# Patient Record
Sex: MALE | Race: WHITE
[De-identification: names, ages, dates, MRNs, and addresses within clinical notes are randomized per-mention and may not be internally consistent; named-entity substitution may affect disease eponyms.]

---

## 2017-03-27 ENCOUNTER — HOSPITAL ENCOUNTER (OUTPATIENT)
Dept: HOSPITAL 8 - LAB | Age: 72
Discharge: HOME | End: 2017-03-27
Attending: NEUROLOGICAL SURGERY
Payer: OTHER GOVERNMENT

## 2017-03-27 DIAGNOSIS — M48.06: Primary | ICD-10-CM

## 2017-03-27 LAB
BUN SERPL-MCNC: 13 MG/DL (ref 7–18)
HGB BLD-MCNC: 15.4 G/DL (ref 13.7–18)

## 2017-03-27 PROCEDURE — 85025 COMPLETE CBC W/AUTO DIFF WBC: CPT

## 2017-03-27 PROCEDURE — 36415 COLL VENOUS BLD VENIPUNCTURE: CPT

## 2017-03-27 PROCEDURE — 80048 BASIC METABOLIC PNL TOTAL CA: CPT

## 2019-08-21 ENCOUNTER — ANESTHESIA EVENT (OUTPATIENT)
Dept: SURGERY | Facility: MEDICAL CENTER | Age: 74
End: 2019-08-21
Payer: COMMERCIAL

## 2019-08-21 ENCOUNTER — ANESTHESIA (OUTPATIENT)
Dept: SURGERY | Facility: MEDICAL CENTER | Age: 74
End: 2019-08-21
Payer: COMMERCIAL

## 2019-08-21 ENCOUNTER — APPOINTMENT (OUTPATIENT)
Dept: RADIOLOGY | Facility: MEDICAL CENTER | Age: 74
End: 2019-08-21
Attending: NEUROLOGICAL SURGERY
Payer: COMMERCIAL

## 2019-08-21 ENCOUNTER — HOSPITAL ENCOUNTER (OUTPATIENT)
Facility: MEDICAL CENTER | Age: 74
End: 2019-08-21
Attending: NEUROLOGICAL SURGERY | Admitting: NEUROLOGICAL SURGERY
Payer: COMMERCIAL

## 2019-08-21 VITALS
BODY MASS INDEX: 29.19 KG/M2 | HEART RATE: 74 BPM | WEIGHT: 203.93 LBS | TEMPERATURE: 97.6 F | RESPIRATION RATE: 17 BRPM | HEIGHT: 70 IN | DIASTOLIC BLOOD PRESSURE: 86 MMHG | SYSTOLIC BLOOD PRESSURE: 127 MMHG | OXYGEN SATURATION: 100 %

## 2019-08-21 LAB — APTT PPP: 34.2 SEC (ref 24.7–36)

## 2019-08-21 PROCEDURE — 700105 HCHG RX REV CODE 258: Performed by: NEUROLOGICAL SURGERY

## 2019-08-21 PROCEDURE — 501838 HCHG SUTURE GENERAL: Performed by: NEUROLOGICAL SURGERY

## 2019-08-21 PROCEDURE — 700101 HCHG RX REV CODE 250: Performed by: ANESTHESIOLOGY

## 2019-08-21 PROCEDURE — 500002 HCHG ADHESIVE, DERMABOND: Performed by: NEUROLOGICAL SURGERY

## 2019-08-21 PROCEDURE — 700101 HCHG RX REV CODE 250: Performed by: NEUROLOGICAL SURGERY

## 2019-08-21 PROCEDURE — 160025 RECOVERY II MINUTES (STATS): Performed by: NEUROLOGICAL SURGERY

## 2019-08-21 PROCEDURE — 85730 THROMBOPLASTIN TIME PARTIAL: CPT

## 2019-08-21 PROCEDURE — 700111 HCHG RX REV CODE 636 W/ 250 OVERRIDE (IP): Performed by: ANESTHESIOLOGY

## 2019-08-21 PROCEDURE — 160048 HCHG OR STATISTICAL LEVEL 1-5: Performed by: NEUROLOGICAL SURGERY

## 2019-08-21 PROCEDURE — 160036 HCHG PACU - EA ADDL 30 MINS PHASE I: Performed by: NEUROLOGICAL SURGERY

## 2019-08-21 PROCEDURE — 700111 HCHG RX REV CODE 636 W/ 250 OVERRIDE (IP)

## 2019-08-21 PROCEDURE — 72020 X-RAY EXAM OF SPINE 1 VIEW: CPT

## 2019-08-21 PROCEDURE — 700102 HCHG RX REV CODE 250 W/ 637 OVERRIDE(OP): Performed by: ANESTHESIOLOGY

## 2019-08-21 PROCEDURE — 160002 HCHG RECOVERY MINUTES (STAT): Performed by: NEUROLOGICAL SURGERY

## 2019-08-21 PROCEDURE — 160047 HCHG PACU  - EA ADDL 30 MINS PHASE II: Performed by: NEUROLOGICAL SURGERY

## 2019-08-21 PROCEDURE — 700111 HCHG RX REV CODE 636 W/ 250 OVERRIDE (IP): Performed by: NEUROLOGICAL SURGERY

## 2019-08-21 PROCEDURE — 160029 HCHG SURGERY MINUTES - 1ST 30 MINS LEVEL 4: Performed by: NEUROLOGICAL SURGERY

## 2019-08-21 PROCEDURE — 160035 HCHG PACU - 1ST 60 MINS PHASE I: Performed by: NEUROLOGICAL SURGERY

## 2019-08-21 PROCEDURE — A9270 NON-COVERED ITEM OR SERVICE: HCPCS | Performed by: ANESTHESIOLOGY

## 2019-08-21 PROCEDURE — 500885 HCHG PACK, JACKSON TABLE: Performed by: NEUROLOGICAL SURGERY

## 2019-08-21 PROCEDURE — 160009 HCHG ANES TIME/MIN: Performed by: NEUROLOGICAL SURGERY

## 2019-08-21 PROCEDURE — 160041 HCHG SURGERY MINUTES - EA ADDL 1 MIN LEVEL 4: Performed by: NEUROLOGICAL SURGERY

## 2019-08-21 PROCEDURE — 160046 HCHG PACU - 1ST 60 MINS PHASE II: Performed by: NEUROLOGICAL SURGERY

## 2019-08-21 RX ORDER — HYDROMORPHONE HYDROCHLORIDE 1 MG/ML
0.1 INJECTION, SOLUTION INTRAMUSCULAR; INTRAVENOUS; SUBCUTANEOUS
Status: DISCONTINUED | OUTPATIENT
Start: 2019-08-21 | End: 2019-08-21 | Stop reason: HOSPADM

## 2019-08-21 RX ORDER — HYDROMORPHONE HYDROCHLORIDE 1 MG/ML
0.2 INJECTION, SOLUTION INTRAMUSCULAR; INTRAVENOUS; SUBCUTANEOUS
Status: DISCONTINUED | OUTPATIENT
Start: 2019-08-21 | End: 2019-08-21 | Stop reason: HOSPADM

## 2019-08-21 RX ORDER — GABAPENTIN 300 MG/1
300 CAPSULE ORAL ONCE
Status: COMPLETED | OUTPATIENT
Start: 2019-08-21 | End: 2019-08-21

## 2019-08-21 RX ORDER — METOPROLOL TARTRATE 1 MG/ML
1 INJECTION, SOLUTION INTRAVENOUS
Status: DISCONTINUED | OUTPATIENT
Start: 2019-08-21 | End: 2019-08-21 | Stop reason: HOSPADM

## 2019-08-21 RX ORDER — METHYLPREDNISOLONE SODIUM SUCCINATE 125 MG/2ML
INJECTION, POWDER, LYOPHILIZED, FOR SOLUTION INTRAMUSCULAR; INTRAVENOUS
Status: DISCONTINUED | OUTPATIENT
Start: 2019-08-21 | End: 2019-08-21 | Stop reason: HOSPADM

## 2019-08-21 RX ORDER — LABETALOL HYDROCHLORIDE 5 MG/ML
5 INJECTION, SOLUTION INTRAVENOUS
Status: DISCONTINUED | OUTPATIENT
Start: 2019-08-21 | End: 2019-08-21 | Stop reason: HOSPADM

## 2019-08-21 RX ORDER — SODIUM CHLORIDE, SODIUM LACTATE, POTASSIUM CHLORIDE, CALCIUM CHLORIDE 600; 310; 30; 20 MG/100ML; MG/100ML; MG/100ML; MG/100ML
INJECTION, SOLUTION INTRAVENOUS CONTINUOUS
Status: DISCONTINUED | OUTPATIENT
Start: 2019-08-21 | End: 2019-08-21 | Stop reason: HOSPADM

## 2019-08-21 RX ORDER — HALOPERIDOL 5 MG/ML
1 INJECTION INTRAMUSCULAR
Status: DISCONTINUED | OUTPATIENT
Start: 2019-08-21 | End: 2019-08-21 | Stop reason: HOSPADM

## 2019-08-21 RX ORDER — ROCURONIUM BROMIDE 10 MG/ML
INJECTION, SOLUTION INTRAVENOUS PRN
Status: DISCONTINUED | OUTPATIENT
Start: 2019-08-21 | End: 2019-08-21 | Stop reason: SURG

## 2019-08-21 RX ORDER — LIDOCAINE HYDROCHLORIDE 10 MG/ML
INJECTION, SOLUTION EPIDURAL; INFILTRATION; INTRACAUDAL; PERINEURAL
Status: COMPLETED
Start: 2019-08-21 | End: 2019-08-21

## 2019-08-21 RX ORDER — MIDAZOLAM HYDROCHLORIDE 1 MG/ML
INJECTION INTRAMUSCULAR; INTRAVENOUS PRN
Status: DISCONTINUED | OUTPATIENT
Start: 2019-08-21 | End: 2019-08-21 | Stop reason: SURG

## 2019-08-21 RX ORDER — OXYCODONE HCL 5 MG/5 ML
10 SOLUTION, ORAL ORAL
Status: COMPLETED | OUTPATIENT
Start: 2019-08-21 | End: 2019-08-21

## 2019-08-21 RX ORDER — OXYCODONE HCL 5 MG/5 ML
5 SOLUTION, ORAL ORAL
Status: COMPLETED | OUTPATIENT
Start: 2019-08-21 | End: 2019-08-21

## 2019-08-21 RX ORDER — BUPIVACAINE HYDROCHLORIDE AND EPINEPHRINE 5; 5 MG/ML; UG/ML
INJECTION, SOLUTION EPIDURAL; INTRACAUDAL; PERINEURAL
Status: DISCONTINUED | OUTPATIENT
Start: 2019-08-21 | End: 2019-08-21 | Stop reason: HOSPADM

## 2019-08-21 RX ORDER — DIPHENHYDRAMINE HYDROCHLORIDE 50 MG/ML
12.5 INJECTION INTRAMUSCULAR; INTRAVENOUS
Status: DISCONTINUED | OUTPATIENT
Start: 2019-08-21 | End: 2019-08-21 | Stop reason: HOSPADM

## 2019-08-21 RX ORDER — ACETAMINOPHEN 500 MG
1000 TABLET ORAL ONCE
Status: COMPLETED | OUTPATIENT
Start: 2019-08-21 | End: 2019-08-21

## 2019-08-21 RX ORDER — HYDROMORPHONE HYDROCHLORIDE 1 MG/ML
0.4 INJECTION, SOLUTION INTRAMUSCULAR; INTRAVENOUS; SUBCUTANEOUS
Status: DISCONTINUED | OUTPATIENT
Start: 2019-08-21 | End: 2019-08-21 | Stop reason: HOSPADM

## 2019-08-21 RX ORDER — GABAPENTIN 300 MG/1
900 CAPSULE ORAL 2 TIMES DAILY
COMMUNITY
End: 2021-06-01

## 2019-08-21 RX ORDER — DEXAMETHASONE SODIUM PHOSPHATE 4 MG/ML
INJECTION, SOLUTION INTRA-ARTICULAR; INTRALESIONAL; INTRAMUSCULAR; INTRAVENOUS; SOFT TISSUE PRN
Status: DISCONTINUED | OUTPATIENT
Start: 2019-08-21 | End: 2019-08-21 | Stop reason: SURG

## 2019-08-21 RX ORDER — KETOROLAC TROMETHAMINE 30 MG/ML
INJECTION, SOLUTION INTRAMUSCULAR; INTRAVENOUS PRN
Status: DISCONTINUED | OUTPATIENT
Start: 2019-08-21 | End: 2019-08-21 | Stop reason: SURG

## 2019-08-21 RX ORDER — BACITRACIN 50000 [IU]/1
INJECTION, POWDER, FOR SOLUTION INTRAMUSCULAR
Status: DISCONTINUED | OUTPATIENT
Start: 2019-08-21 | End: 2019-08-21 | Stop reason: HOSPADM

## 2019-08-21 RX ORDER — OXYCODONE HYDROCHLORIDE AND ACETAMINOPHEN 5; 325 MG/1; MG/1
1-2 TABLET ORAL EVERY 4 HOURS PRN
Status: ON HOLD | COMMUNITY
End: 2019-08-21

## 2019-08-21 RX ORDER — CEFAZOLIN SODIUM 1 G/3ML
INJECTION, POWDER, FOR SOLUTION INTRAMUSCULAR; INTRAVENOUS PRN
Status: DISCONTINUED | OUTPATIENT
Start: 2019-08-21 | End: 2019-08-21 | Stop reason: SURG

## 2019-08-21 RX ORDER — HYDRALAZINE HYDROCHLORIDE 20 MG/ML
5 INJECTION INTRAMUSCULAR; INTRAVENOUS
Status: DISCONTINUED | OUTPATIENT
Start: 2019-08-21 | End: 2019-08-21 | Stop reason: HOSPADM

## 2019-08-21 RX ORDER — ONDANSETRON 2 MG/ML
INJECTION INTRAMUSCULAR; INTRAVENOUS PRN
Status: DISCONTINUED | OUTPATIENT
Start: 2019-08-21 | End: 2019-08-21 | Stop reason: SURG

## 2019-08-21 RX ADMIN — FENTANYL CITRATE 25 MCG: 50 INJECTION, SOLUTION INTRAMUSCULAR; INTRAVENOUS at 09:11

## 2019-08-21 RX ADMIN — SODIUM CHLORIDE, POTASSIUM CHLORIDE, SODIUM LACTATE AND CALCIUM CHLORIDE: 600; 310; 30; 20 INJECTION, SOLUTION INTRAVENOUS at 06:26

## 2019-08-21 RX ADMIN — Medication 0.5 ML: at 06:26

## 2019-08-21 RX ADMIN — FENTANYL CITRATE 25 MCG: 50 INJECTION, SOLUTION INTRAMUSCULAR; INTRAVENOUS at 09:20

## 2019-08-21 RX ADMIN — SUGAMMADEX 200 MG: 100 INJECTION, SOLUTION INTRAVENOUS at 08:38

## 2019-08-21 RX ADMIN — MIDAZOLAM 2 MG: 1 INJECTION INTRAMUSCULAR; INTRAVENOUS at 07:36

## 2019-08-21 RX ADMIN — PROPOFOL 150 MG: 10 INJECTION, EMULSION INTRAVENOUS at 07:46

## 2019-08-21 RX ADMIN — ACETAMINOPHEN 1000 MG: 500 TABLET ORAL at 07:28

## 2019-08-21 RX ADMIN — GABAPENTIN 300 MG: 300 CAPSULE ORAL at 07:28

## 2019-08-21 RX ADMIN — CEFAZOLIN 2 G: 330 INJECTION, POWDER, FOR SOLUTION INTRAMUSCULAR; INTRAVENOUS at 07:36

## 2019-08-21 RX ADMIN — ROCURONIUM BROMIDE 50 MG: 10 INJECTION, SOLUTION INTRAVENOUS at 07:46

## 2019-08-21 RX ADMIN — FENTANYL CITRATE 100 MCG: 50 INJECTION, SOLUTION INTRAMUSCULAR; INTRAVENOUS at 07:46

## 2019-08-21 RX ADMIN — ONDANSETRON 4 MG: 2 INJECTION INTRAMUSCULAR; INTRAVENOUS at 08:35

## 2019-08-21 RX ADMIN — DEXAMETHASONE SODIUM PHOSPHATE 4 MG: 4 INJECTION, SOLUTION INTRA-ARTICULAR; INTRALESIONAL; INTRAMUSCULAR; INTRAVENOUS; SOFT TISSUE at 07:38

## 2019-08-21 RX ADMIN — OXYCODONE HYDROCHLORIDE 10 MG: 5 SOLUTION ORAL at 09:08

## 2019-08-21 RX ADMIN — LIDOCAINE HYDROCHLORIDE 0.5 ML: 10 INJECTION, SOLUTION EPIDURAL; INFILTRATION; INTRACAUDAL at 06:26

## 2019-08-21 RX ADMIN — KETOROLAC TROMETHAMINE 30 MG: 30 INJECTION, SOLUTION INTRAMUSCULAR at 08:35

## 2019-08-21 ASSESSMENT — PAIN SCALES - GENERAL: PAIN_LEVEL: 0

## 2019-08-21 NOTE — DISCHARGE INSTRUCTIONS
ACTIVITY: Rest and take it easy for the first 24 hours.  A responsible adult is recommended to remain with you during that time.  It is normal to feel sleepy.  We encourage you to not do anything that requires balance, judgment or coordination.    MILD FLU-LIKE SYMPTOMS ARE NORMAL. YOU MAY EXPERIENCE GENERALIZED MUSCLE ACHES, THROAT IRRITATION, HEADACHE AND/OR SOME NAUSEA.    FOR 24 HOURS DO NOT:  Drive, operate machinery or run household appliances.  Drink beer or alcoholic beverages.   Make important decisions or sign legal documents.    SPECIAL INSTRUCTIONS:     Follow up with APN at [facility] in 2 weeks [phone]  Follow up with Dr. Peralta in 6 weeks  No pushing, pulling, lifting greater than 10 pounds  No repetitive bending, no twisting   Ok to shower, pat incision dry - 24 hours after surgery. NO bath tubs, hot tubs, pools, etc   No non-steroidal anti-inflammatory medications or aspirin until cleared by Dr. Peralta  Ambulate as much is comfortable  No driving for at least 2 weeks following surgery or until cleared  Obtain over the counter senekot take 1-2 tablets daily while taking narcotic pain medication  Do not return to work until cleared by physician    DIET: To avoid nausea, slowly advance diet as tolerated, avoiding spicy or greasy foods for the first day.  Add more substantial food to your diet according to your physician's instructions. INCREASE FLUIDS AND FIBER TO AVOID CONSTIPATION.    FOLLOW-UP APPOINTMENT:  A follow-up appointment should be arranged with your doctor in *2 weeks*; call to schedule.    You should CALL YOUR PHYSICIAN if you develop:  Fever greater than 101 degrees F.  Pain not relieved by medication, or persistent nausea or vomiting.  Excessive bleeding (blood soaking through dressing) or unexpected drainage from the wound.  Extreme redness or swelling around the incision site, drainage of pus or foul smelling drainage.  Inability to urinate or empty your bladder  within 8 hours.  Problems with breathing or chest pain.    You should call 911 if you develop problems with breathing or chest pain.  If you are unable to contact your doctor or surgical center, you should go to the nearest emergency room or urgent care center.  Physician's telephone #: Dr. Peralta [phone]    If any questions arise, call your doctor.  If your doctor is not available, please feel free to call the Surgical Center at [phone].  The Center is open Monday through Friday from 7AM to 7PM.  You can also call the [facility] HOTLINE open 24 hours/day, 7 days/week and speak to a nurse at [phone], or toll free at [phone].    A registered nurse may call you a few days after your surgery to see how you are doing after your procedure.    MEDICATIONS: Resume taking daily medication.  Take prescribed pain medication with food.  If no medication is prescribed, you may take non-aspirin pain medication if needed.  PAIN MEDICATION CAN BE VERY CONSTIPATING.  Take a stool softener or laxative such as senokot, pericolace, or milk of magnesia if needed.    Prescriptions at home.  Last pain medication (10 mg oxycodone) given at 9:08 am.    If your physician has prescribed pain medication that includes Acetaminophen (Tylenol), do not take additional Acetaminophen (Tylenol) while taking the prescribed medication.    Depression / Suicide Risk    As you are discharged from this [facility] facility, it is important to learn how to keep safe from harming yourself.    Recognize the warning signs:  · Abrupt changes in personality, positive or negative- including increase in energy   · Giving away possessions  · Change in eating patterns- significant weight changes-  positive or negative  · Change in sleeping patterns- unable to sleep or sleeping all the time   · Unwillingness or inability to communicate  · Depression  · Unusual sadness, discouragement and loneliness  · Talk of wanting to die  · Neglect of  personal appearance   · Rebelliousness- reckless behavior  · Withdrawal from people/activities they love  · Confusion- inability to concentrate     If you or a loved one observes any of these behaviors or has concerns about self-harm, here's what you can do:  · Talk about it- your feelings and reasons for harming yourself  · Remove any means that you might use to hurt yourself (examples: pills, rope, extension cords, firearm)  · Get professional help from the community (Mental Health, Substance Abuse, psychological counseling)  · Do not be alone:Call your Safe Contact- someone whom you trust who will be there for you.  · Call your local CRISIS HOTLINE 836-2623 or 072-354-9271  · Call your local Children's Mobile Crisis Response Team [place] (840) 599-2013 or www.PayClip  · Call the toll free National Suicide Prevention Hotlines   · National Suicide Prevention Lifeline 657-884-WWTA (4739)  · National Hope Line Network 800-SUICIDE (458-0681)

## 2019-08-21 NOTE — OR SURGEON
Immediate Post OP Note    PreOp Diagnosis: lumbar stenosis     PostOp Diagnosis: lumbar stenosis     Procedure(s):  LAMINECTOMY, SPINE, LUMBAR, WITH [id number] - Wound Class: Clean    Surgeon(s):  Sukhwinder Peralta M.D.    Anesthesiologist/Type of Anesthesia:  Anesthesiologist: Calixto Swan D.O./General    Surgical Staff:  Assistant: CORTEZ Siddiqi  Circulator: Kay Grant R.N.  Scrub Person: Zhao Bkaer  Radiology Technologist: Tiara Kimbrough    Specimens removed if any:  * No specimens in log *    Estimated Blood Loss: min     Findings: good decompression     Complications: none         8/21/2019 10:47 AM CORTEZ Siddiqi

## 2019-08-21 NOTE — OR NURSING
Patient is awake alert and oriented, vital signs stable, resting in bed, no complaints of nausea, dressing to lower back is clean dry and intact.

## 2019-08-21 NOTE — ANESTHESIA TIME REPORT
Anesthesia Start and Stop Event Times     Date Time Event    8/21/2019 0701 Ready for Procedure     0736 Anesthesia Start     0859 Anesthesia Stop        Responsible Staff  08/21/19    Name Role Begin End    Calixto Swan D.O. Anesth 0736 0859        Preop Diagnosis (Free Text):  Pre-op Diagnosis     SPINAL STENOSIS OF LUMBAR REGION        Preop Diagnosis (Codes):    Post op Diagnosis  Spinal stenosis of lumbar region      Premium Reason  Non-Premium    Comments:

## 2019-08-21 NOTE — PROGRESS NOTES
1023- Patient arrived in PACU II alert and oriented. Vital signs are within normal limits for the patient. Patient reports pain 5/10 but it feels tolerable at this time. Dermabond closure is clean, dry, and intact. Discussed discharge plan of care and patient expressed understanding. All needs met at this time. Wife, Dianne, is at bedside.    1045- Provided patient with discharge education with wife at bedside. All questions answered.    1120- Patient ambulated to the restroom to void. Patient is steady on his feet with standby assist. Patient voided successfully.     1125- Patient feels ready to be discharged and meets discharge criteria set by Dr. Peralta. Patient is going to get dressed.     1130- PIV removed and patient discharged home via wheelchair.

## 2019-08-21 NOTE — ANESTHESIA POSTPROCEDURE EVALUATION
Patient: Pérez Willams    Procedure Summary     Date:  08/21/19 Room / Location:  [facility] 05 / SURGERY [facility]    Anesthesia Start:  0736 Anesthesia Stop:  0859    Procedure:  LAMINECTOMY, SPINE, LUMBAR, WITH [id number] (Spine Lumbar) Diagnosis:  (SPINAL STENOSIS OF LUMBAR REGION)    Surgeon:  Sukhwinder Peralta M.D. Responsible Provider:  Calixto Swan D.O.    Anesthesia Type:  general ASA Status:  2          Final Anesthesia Type: general  Last vitals  BP   NIBP: 138/86    Temp   36.8 °C (98.2 °F)    Pulse   Pulse: 78   Resp   18    SpO2   92 %      Anesthesia Post Evaluation    Patient location during evaluation: PACU  Patient participation: complete - patient participated  Level of consciousness: awake and alert  Pain score: 0    Airway patency: patent  Anesthetic complications: no  Cardiovascular status: hemodynamically stable  Respiratory status: acceptable  Hydration status: euvolemic    PONV: none           Nurse Pain Score: 0 (NPRS)

## 2019-08-21 NOTE — PROGRESS NOTES
Med rec updated and complete  Allergies reviewed  Interviewed pt with wife at bedside with permission from pt.  Pt reports no vitamins   Pt reports no antibiotics in the last 2 weeks

## 2019-08-21 NOTE — OP REPORT
DATE OF SERVICE:  08/21/2019    PREOPERATIVE DIAGNOSIS:  Lumbar 3-4 severe central canal stenosis with motor   and sensory radiculopathy recalcitrant to nonoperative measures.    POSTOPERATIVE DIAGNOSIS:  Lumbar 3-4 severe central canal stenosis with motor   and sensory radiculopathy recalcitrant to nonoperative measures.    PROCEDURES:  1.  Lumbar 3-4 laminectomy.  2.  Decompression of lumbar 3 roots.  3.  Decompression of lumbar 4 roots.  4.  Use of loupe magnification.    SURGEON:  Sukhwinder Peralta MD    ASSISTANT:  TARIQ Siddiqi    ANESTHESIA:  General.    COMPLICATIONS:  None.    ESTIMATED BLOOD LOSS:  Minimal.    DESCRIPTION OF PROCEDURE:  The patient was brought to the operating room,   identified in the usual fashion.  General endotracheal anesthesia was induced   by the anesthesia team.  The patient was then placed prone on the Rangel   table with bolsters.  All pressure points were meticulously padded.  Midline   lumbar incision was marked in the skin just inferior to his prior incision at   lumbar 2-3.  The patient was then prepped and draped in usual sterile fashion.    Local anesthesia was infiltrated in the subcutaneous tissue.  A 10 blade was   used to incise the skin.  Dissection was carried down in subperiosteal   fashion.  Retractors were put in place.  Kocher was placed at lumbar 3-4.  A   film was taken confirming that we were at the correct level.  This was then   marked with a Leksell rongeur.  We then adjusted the retractors and performed   a standard laminectomy of lumbar 3-4 using a combination of Leksell rongeur,   high-speed air drill, Kerrison 2, 3, and 4 punches.  We identified the thecal   sac and had it widely decompressed to the medial border of the pedicles.    Lateral recesses were nicely decompressed as confirmed with a double ball   probe and Kerrison 2 and 3 punches.  Lumbar 3 nerve roots were nicely   decompressed as well as the lumbar 4 root as well with  Kerrison 2 and 3   punches and confirmation with the [facility] dental.  Copious amounts of   antibiotic irrigation were used to wash out the wound.  FloSeal with gentle   tamponade was used for hemostasis.  Solu-Medrol and fentanyl were placed in   the epidural space.  All retractors were removed.  Hemostasis was meticulous.    We then closed the incision in layers and topped with Dermabond.  All sponge   and needle counts were correct x2 at the end the case.  I was present and   scrubbed for the entire procedure.  The patient was awakened and was   transferred to the recovery room in stable condition.       ____________________________________     LISA HONG MD    CPD / NTS    DD:  08/21/2019 09:10:28  DT:  08/21/2019 10:48:03    D#:  [id number]  Job#:  [id number]

## 2021-07-01 ENCOUNTER — HOSPITAL ENCOUNTER (OUTPATIENT)
Facility: MEDICAL CENTER | Age: 76
End: 2021-07-01
Attending: NEUROLOGICAL SURGERY | Admitting: NEUROLOGICAL SURGERY
Payer: COMMERCIAL

## 2021-07-04 ENCOUNTER — HOSPITAL ENCOUNTER (INPATIENT)
Facility: MEDICAL CENTER | Age: 76
LOS: 5 days | DRG: 854 | End: 2021-07-09
Attending: INTERNAL MEDICINE | Admitting: STUDENT IN AN ORGANIZED HEALTH CARE EDUCATION/TRAINING PROGRAM
Payer: COMMERCIAL

## 2021-07-04 ENCOUNTER — APPOINTMENT (OUTPATIENT)
Dept: RADIOLOGY | Facility: MEDICAL CENTER | Age: 76
DRG: 854 | End: 2021-07-04
Attending: STUDENT IN AN ORGANIZED HEALTH CARE EDUCATION/TRAINING PROGRAM
Payer: COMMERCIAL

## 2021-07-04 ENCOUNTER — HOSPITAL ENCOUNTER (OUTPATIENT)
Dept: RADIOLOGY | Facility: MEDICAL CENTER | Age: 76
End: 2021-07-04

## 2021-07-04 DIAGNOSIS — K80.31 CALCULUS OF BILE DUCT WITH CHOLANGITIS AND OBSTRUCTION, UNSPECIFIED CHOLANGITIS ACUITY: ICD-10-CM

## 2021-07-04 PROBLEM — R10.11 RUQ PAIN: Status: ACTIVE | Noted: 2021-07-04

## 2021-07-04 PROBLEM — J44.9 COPD (CHRONIC OBSTRUCTIVE PULMONARY DISEASE) (HCC): Status: ACTIVE | Noted: 2021-07-04

## 2021-07-04 PROBLEM — I10 HTN (HYPERTENSION): Status: ACTIVE | Noted: 2021-07-04

## 2021-07-04 PROBLEM — A41.9 SEPSIS (HCC): Status: ACTIVE | Noted: 2021-07-04

## 2021-07-04 LAB
ALBUMIN SERPL BCP-MCNC: 3.4 G/DL (ref 3.2–4.9)
ALBUMIN/GLOB SERPL: 1.1 G/DL
ALP SERPL-CCNC: 322 U/L (ref 30–99)
ALT SERPL-CCNC: 246 U/L (ref 2–50)
ANION GAP SERPL CALC-SCNC: 11 MMOL/L (ref 7–16)
APPEARANCE UR: CLEAR
AST SERPL-CCNC: 254 U/L (ref 12–45)
BACTERIA #/AREA URNS HPF: NEGATIVE /HPF
BASOPHILS # BLD AUTO: 0.3 % (ref 0–1.8)
BASOPHILS # BLD: 0.03 K/UL (ref 0–0.12)
BILIRUB SERPL-MCNC: 5.8 MG/DL (ref 0.1–1.5)
BILIRUB UR QL STRIP.AUTO: ABNORMAL
BUN SERPL-MCNC: 20 MG/DL (ref 8–22)
CALCIUM SERPL-MCNC: 8.8 MG/DL (ref 8.5–10.5)
CHLORIDE SERPL-SCNC: 106 MMOL/L (ref 96–112)
CO2 SERPL-SCNC: 24 MMOL/L (ref 20–33)
COLOR UR: ABNORMAL
CREAT SERPL-MCNC: 1.07 MG/DL (ref 0.5–1.4)
EKG IMPRESSION: NORMAL
EOSINOPHIL # BLD AUTO: 0.03 K/UL (ref 0–0.51)
EOSINOPHIL NFR BLD: 0.3 % (ref 0–6.9)
EPI CELLS #/AREA URNS HPF: NEGATIVE /HPF
ERYTHROCYTE [DISTWIDTH] IN BLOOD BY AUTOMATED COUNT: 44.6 FL (ref 35.9–50)
GGT SERPL-CCNC: 671 U/L (ref 7–51)
GLOBULIN SER CALC-MCNC: 3 G/DL (ref 1.9–3.5)
GLUCOSE SERPL-MCNC: 120 MG/DL (ref 65–99)
GLUCOSE UR STRIP.AUTO-MCNC: NEGATIVE MG/DL
HCT VFR BLD AUTO: 38 % (ref 42–52)
HGB BLD-MCNC: 13.3 G/DL (ref 14–18)
HYALINE CASTS #/AREA URNS LPF: ABNORMAL /LPF
IMM GRANULOCYTES # BLD AUTO: 0.05 K/UL (ref 0–0.11)
IMM GRANULOCYTES NFR BLD AUTO: 0.4 % (ref 0–0.9)
INR PPP: 1.2 (ref 0.87–1.13)
KETONES UR STRIP.AUTO-MCNC: NEGATIVE MG/DL
LACTATE BLD-SCNC: 1.2 MMOL/L (ref 0.5–2)
LACTATE BLD-SCNC: 1.3 MMOL/L (ref 0.5–2)
LACTATE BLD-SCNC: 1.4 MMOL/L (ref 0.5–2)
LEUKOCYTE ESTERASE UR QL STRIP.AUTO: NEGATIVE
LYMPHOCYTES # BLD AUTO: 0.59 K/UL (ref 1–4.8)
LYMPHOCYTES NFR BLD: 5.1 % (ref 22–41)
MCH RBC QN AUTO: 31.1 PG (ref 27–33)
MCHC RBC AUTO-ENTMCNC: 35 G/DL (ref 33.7–35.3)
MCV RBC AUTO: 88.8 FL (ref 81.4–97.8)
MICRO URNS: ABNORMAL
MONOCYTES # BLD AUTO: 0.84 K/UL (ref 0–0.85)
MONOCYTES NFR BLD AUTO: 7.3 % (ref 0–13.4)
NEUTROPHILS # BLD AUTO: 10.03 K/UL (ref 1.82–7.42)
NEUTROPHILS NFR BLD: 86.6 % (ref 44–72)
NITRITE UR QL STRIP.AUTO: NEGATIVE
NRBC # BLD AUTO: 0 K/UL
NRBC BLD-RTO: 0 /100 WBC
PH UR STRIP.AUTO: 7.5 [PH] (ref 5–8)
PLATELET # BLD AUTO: 143 K/UL (ref 164–446)
PMV BLD AUTO: 10.2 FL (ref 9–12.9)
POTASSIUM SERPL-SCNC: 4 MMOL/L (ref 3.6–5.5)
PROT SERPL-MCNC: 6.4 G/DL (ref 6–8.2)
PROT UR QL STRIP: 30 MG/DL
PROTHROMBIN TIME: 14.8 SEC (ref 12–14.6)
RBC # BLD AUTO: 4.28 M/UL (ref 4.7–6.1)
RBC # URNS HPF: ABNORMAL /HPF
RBC UR QL AUTO: ABNORMAL
SODIUM SERPL-SCNC: 141 MMOL/L (ref 135–145)
SP GR UR STRIP.AUTO: 1.04
UROBILINOGEN UR STRIP.AUTO-MCNC: 2 MG/DL
WBC # BLD AUTO: 11.6 K/UL (ref 4.8–10.8)
WBC #/AREA URNS HPF: ABNORMAL /HPF

## 2021-07-04 PROCEDURE — 85025 COMPLETE CBC W/AUTO DIFF WBC: CPT

## 2021-07-04 PROCEDURE — 74181 MRI ABDOMEN W/O CONTRAST: CPT | Mod: MG

## 2021-07-04 PROCEDURE — 83605 ASSAY OF LACTIC ACID: CPT

## 2021-07-04 PROCEDURE — A9270 NON-COVERED ITEM OR SERVICE: HCPCS | Performed by: STUDENT IN AN ORGANIZED HEALTH CARE EDUCATION/TRAINING PROGRAM

## 2021-07-04 PROCEDURE — 85610 PROTHROMBIN TIME: CPT

## 2021-07-04 PROCEDURE — 82977 ASSAY OF GGT: CPT

## 2021-07-04 PROCEDURE — 76705 ECHO EXAM OF ABDOMEN: CPT

## 2021-07-04 PROCEDURE — 700105 HCHG RX REV CODE 258: Performed by: STUDENT IN AN ORGANIZED HEALTH CARE EDUCATION/TRAINING PROGRAM

## 2021-07-04 PROCEDURE — 770006 HCHG ROOM/CARE - MED/SURG/GYN SEMI*

## 2021-07-04 PROCEDURE — 80053 COMPREHEN METABOLIC PANEL: CPT

## 2021-07-04 PROCEDURE — 99223 1ST HOSP IP/OBS HIGH 75: CPT | Performed by: STUDENT IN AN ORGANIZED HEALTH CARE EDUCATION/TRAINING PROGRAM

## 2021-07-04 PROCEDURE — 81001 URINALYSIS AUTO W/SCOPE: CPT

## 2021-07-04 PROCEDURE — 700102 HCHG RX REV CODE 250 W/ 637 OVERRIDE(OP): Performed by: STUDENT IN AN ORGANIZED HEALTH CARE EDUCATION/TRAINING PROGRAM

## 2021-07-04 PROCEDURE — 93010 ELECTROCARDIOGRAM REPORT: CPT | Performed by: INTERNAL MEDICINE

## 2021-07-04 PROCEDURE — 36415 COLL VENOUS BLD VENIPUNCTURE: CPT

## 2021-07-04 PROCEDURE — 700111 HCHG RX REV CODE 636 W/ 250 OVERRIDE (IP): Performed by: STUDENT IN AN ORGANIZED HEALTH CARE EDUCATION/TRAINING PROGRAM

## 2021-07-04 PROCEDURE — 87077 CULTURE AEROBIC IDENTIFY: CPT | Mod: 91

## 2021-07-04 PROCEDURE — 93005 ELECTROCARDIOGRAM TRACING: CPT | Performed by: STUDENT IN AN ORGANIZED HEALTH CARE EDUCATION/TRAINING PROGRAM

## 2021-07-04 PROCEDURE — 87186 SC STD MICRODIL/AGAR DIL: CPT

## 2021-07-04 PROCEDURE — 71045 X-RAY EXAM CHEST 1 VIEW: CPT

## 2021-07-04 PROCEDURE — 87040 BLOOD CULTURE FOR BACTERIA: CPT | Mod: 91

## 2021-07-04 PROCEDURE — 700101 HCHG RX REV CODE 250: Performed by: STUDENT IN AN ORGANIZED HEALTH CARE EDUCATION/TRAINING PROGRAM

## 2021-07-04 RX ORDER — AMOXICILLIN 250 MG
2 CAPSULE ORAL 2 TIMES DAILY
Status: DISCONTINUED | OUTPATIENT
Start: 2021-07-04 | End: 2021-07-09 | Stop reason: HOSPADM

## 2021-07-04 RX ORDER — OXYCODONE HYDROCHLORIDE 5 MG/1
5 TABLET ORAL
Status: DISCONTINUED | OUTPATIENT
Start: 2021-07-04 | End: 2021-07-06

## 2021-07-04 RX ORDER — LISINOPRIL 5 MG/1
2.5 TABLET ORAL
Status: DISCONTINUED | OUTPATIENT
Start: 2021-07-04 | End: 2021-07-09 | Stop reason: HOSPADM

## 2021-07-04 RX ORDER — SODIUM CHLORIDE 9 MG/ML
INJECTION, SOLUTION INTRAVENOUS CONTINUOUS
Status: DISCONTINUED | OUTPATIENT
Start: 2021-07-04 | End: 2021-07-09 | Stop reason: HOSPADM

## 2021-07-04 RX ORDER — POLYETHYLENE GLYCOL 3350 17 G/17G
1 POWDER, FOR SOLUTION ORAL
Status: DISCONTINUED | OUTPATIENT
Start: 2021-07-04 | End: 2021-07-09 | Stop reason: HOSPADM

## 2021-07-04 RX ORDER — OMEPRAZOLE 20 MG/1
40 CAPSULE, DELAYED RELEASE ORAL 2 TIMES DAILY
Status: DISCONTINUED | OUTPATIENT
Start: 2021-07-04 | End: 2021-07-09 | Stop reason: HOSPADM

## 2021-07-04 RX ORDER — HYDROMORPHONE HYDROCHLORIDE 1 MG/ML
0.25 INJECTION, SOLUTION INTRAMUSCULAR; INTRAVENOUS; SUBCUTANEOUS
Status: DISCONTINUED | OUTPATIENT
Start: 2021-07-04 | End: 2021-07-06

## 2021-07-04 RX ORDER — CARVEDILOL 6.25 MG/1
6.25 TABLET ORAL 2 TIMES DAILY WITH MEALS
Status: DISCONTINUED | OUTPATIENT
Start: 2021-07-04 | End: 2021-07-09 | Stop reason: HOSPADM

## 2021-07-04 RX ORDER — LORAZEPAM 2 MG/ML
1 INJECTION INTRAMUSCULAR ONCE
Status: COMPLETED | OUTPATIENT
Start: 2021-07-04 | End: 2021-07-04

## 2021-07-04 RX ORDER — OXYCODONE HYDROCHLORIDE 5 MG/1
2.5 TABLET ORAL
Status: DISCONTINUED | OUTPATIENT
Start: 2021-07-04 | End: 2021-07-06

## 2021-07-04 RX ORDER — BUDESONIDE AND FORMOTEROL FUMARATE DIHYDRATE 160; 4.5 UG/1; UG/1
2 AEROSOL RESPIRATORY (INHALATION) 2 TIMES DAILY
COMMUNITY
End: 2021-08-17

## 2021-07-04 RX ORDER — ONDANSETRON 2 MG/ML
4 INJECTION INTRAMUSCULAR; INTRAVENOUS EVERY 4 HOURS PRN
Status: DISCONTINUED | OUTPATIENT
Start: 2021-07-04 | End: 2021-07-09 | Stop reason: HOSPADM

## 2021-07-04 RX ORDER — LABETALOL HYDROCHLORIDE 5 MG/ML
10 INJECTION, SOLUTION INTRAVENOUS EVERY 4 HOURS PRN
Status: DISCONTINUED | OUTPATIENT
Start: 2021-07-04 | End: 2021-07-09 | Stop reason: HOSPADM

## 2021-07-04 RX ORDER — ONDANSETRON 4 MG/1
4 TABLET, ORALLY DISINTEGRATING ORAL EVERY 4 HOURS PRN
Status: DISCONTINUED | OUTPATIENT
Start: 2021-07-04 | End: 2021-07-09 | Stop reason: HOSPADM

## 2021-07-04 RX ORDER — ENALAPRILAT 1.25 MG/ML
1.25 INJECTION INTRAVENOUS EVERY 6 HOURS PRN
Status: DISCONTINUED | OUTPATIENT
Start: 2021-07-04 | End: 2021-07-09 | Stop reason: HOSPADM

## 2021-07-04 RX ORDER — TIOTROPIUM BROMIDE 18 UG/1
18 CAPSULE ORAL; RESPIRATORY (INHALATION) DAILY
COMMUNITY
End: 2021-08-17

## 2021-07-04 RX ORDER — AMLODIPINE BESYLATE 10 MG/1
10 TABLET ORAL DAILY
Status: DISCONTINUED | OUTPATIENT
Start: 2021-07-04 | End: 2021-07-09 | Stop reason: HOSPADM

## 2021-07-04 RX ORDER — OMEPRAZOLE 20 MG/1
40 CAPSULE, DELAYED RELEASE ORAL 2 TIMES DAILY
COMMUNITY
End: 2021-08-17

## 2021-07-04 RX ORDER — BISACODYL 10 MG
10 SUPPOSITORY, RECTAL RECTAL
Status: DISCONTINUED | OUTPATIENT
Start: 2021-07-04 | End: 2021-07-09 | Stop reason: HOSPADM

## 2021-07-04 RX ORDER — HEPARIN SODIUM 5000 [USP'U]/ML
5000 INJECTION, SOLUTION INTRAVENOUS; SUBCUTANEOUS EVERY 8 HOURS
Status: DISCONTINUED | OUTPATIENT
Start: 2021-07-04 | End: 2021-07-06

## 2021-07-04 RX ADMIN — OMEPRAZOLE 40 MG: 20 CAPSULE, DELAYED RELEASE ORAL at 05:57

## 2021-07-04 RX ADMIN — AMLODIPINE BESYLATE 10 MG: 10 TABLET ORAL at 05:57

## 2021-07-04 RX ADMIN — METRONIDAZOLE 500 MG: 500 INJECTION, SOLUTION INTRAVENOUS at 05:57

## 2021-07-04 RX ADMIN — METRONIDAZOLE 500 MG: 500 INJECTION, SOLUTION INTRAVENOUS at 13:50

## 2021-07-04 RX ADMIN — OXYCODONE 5 MG: 5 TABLET ORAL at 10:35

## 2021-07-04 RX ADMIN — METRONIDAZOLE 500 MG: 500 INJECTION, SOLUTION INTRAVENOUS at 21:16

## 2021-07-04 RX ADMIN — CEFTRIAXONE SODIUM 2 G: 10 INJECTION, POWDER, FOR SOLUTION INTRAVENOUS at 05:57

## 2021-07-04 RX ADMIN — SODIUM CHLORIDE: 9 INJECTION, SOLUTION INTRAVENOUS at 05:56

## 2021-07-04 RX ADMIN — CARVEDILOL 6.25 MG: 6.25 TABLET, FILM COATED ORAL at 17:39

## 2021-07-04 RX ADMIN — CARVEDILOL 6.25 MG: 6.25 TABLET, FILM COATED ORAL at 07:54

## 2021-07-04 RX ADMIN — LISINOPRIL 2.5 MG: 5 TABLET ORAL at 05:58

## 2021-07-04 RX ADMIN — LORAZEPAM 1 MG: 2 INJECTION INTRAMUSCULAR; INTRAVENOUS at 16:10

## 2021-07-04 RX ADMIN — OMEPRAZOLE 40 MG: 20 CAPSULE, DELAYED RELEASE ORAL at 17:39

## 2021-07-04 RX ADMIN — OXYCODONE 5 MG: 5 TABLET ORAL at 21:16

## 2021-07-04 ASSESSMENT — ENCOUNTER SYMPTOMS
DIZZINESS: 0
SHORTNESS OF BREATH: 0
POLYDIPSIA: 0
PALPITATIONS: 0
FEVER: 0
COUGH: 0
WEAKNESS: 0
WHEEZING: 0
WEIGHT LOSS: 0
BACK PAIN: 0
SPUTUM PRODUCTION: 0
ABDOMINAL PAIN: 1
TINGLING: 0
DIARRHEA: 0
EYE PAIN: 0
MEMORY LOSS: 0
SINUS PAIN: 0
NECK PAIN: 0
DOUBLE VISION: 0
SORE THROAT: 0
MYALGIAS: 0
NAUSEA: 0
BRUISES/BLEEDS EASILY: 0
EYE REDNESS: 0
FLANK PAIN: 0
NERVOUS/ANXIOUS: 0
HEADACHES: 0
SEIZURES: 0
STRIDOR: 0
DIAPHORESIS: 0
BLOOD IN STOOL: 0
VOMITING: 0
EYE DISCHARGE: 0
CHILLS: 0
INSOMNIA: 0

## 2021-07-04 ASSESSMENT — LIFESTYLE VARIABLES
ALCOHOL_USE: NO
HOW MANY TIMES IN THE PAST YEAR HAVE YOU HAD 5 OR MORE DRINKS IN A DAY: 0
EVER FELT BAD OR GUILTY ABOUT YOUR DRINKING: NO
ON A TYPICAL DAY WHEN YOU DRINK ALCOHOL HOW MANY DRINKS DO YOU HAVE: 0
TOTAL SCORE: 0
HAVE PEOPLE ANNOYED YOU BY CRITICIZING YOUR DRINKING: NO
TOTAL SCORE: 0
EVER HAD A DRINK FIRST THING IN THE MORNING TO STEADY YOUR NERVES TO GET RID OF A HANGOVER: NO
TOTAL SCORE: 0
AVERAGE NUMBER OF DAYS PER WEEK YOU HAVE A DRINK CONTAINING ALCOHOL: 0
HAVE YOU EVER FELT YOU SHOULD CUT DOWN ON YOUR DRINKING: NO
CONSUMPTION TOTAL: NEGATIVE
DOES PATIENT WANT TO STOP DRINKING: NO

## 2021-07-04 ASSESSMENT — COGNITIVE AND FUNCTIONAL STATUS - GENERAL
DAILY ACTIVITIY SCORE: 24
SUGGESTED CMS G CODE MODIFIER DAILY ACTIVITY: CH
MOBILITY SCORE: 24
SUGGESTED CMS G CODE MODIFIER MOBILITY: CH

## 2021-07-04 ASSESSMENT — PAIN DESCRIPTION - PAIN TYPE
TYPE: ACUTE PAIN

## 2021-07-04 ASSESSMENT — PATIENT HEALTH QUESTIONNAIRE - PHQ9
1. LITTLE INTEREST OR PLEASURE IN DOING THINGS: NOT AT ALL
SUM OF ALL RESPONSES TO PHQ9 QUESTIONS 1 AND 2: 0
2. FEELING DOWN, DEPRESSED, IRRITABLE, OR HOPELESS: NOT AT ALL

## 2021-07-04 NOTE — ASSESSMENT & PLAN NOTE
This is Sepsis Present on admission  SIRS criteria identified on my evaluation include: Tachycardia, with heart rate greater than 90 BPM and Tachypnea, with respirations greater than 20 per minute  Source -cholangitis, bacteremia  Sepsis protocol initiated  Fluid resuscitation ordered per protocol  IV antibiotics as appropriate for source of sepsis  While organ dysfunction may be noted elsewhere in this problem list or in the chart, degree of organ dysfunction does not meet CMS criteria for severe sepsis

## 2021-07-04 NOTE — RESPIRATORY CARE
"  COPD EDUCATION by COPD CLINICAL EDUCATOR  (Phone: [phone])  7/4/2021 at 12:56 PM by Melanie Ray, RRT     Patient was interviewed by Respiratory Education team for COPD program. Patient refused COPD program. Information packet about lung disease provided and reviewed with patient at bedside. He declined further discussion. I thanked him for his service.  COPD Assessment  COPD Clinical Specialists ONLY  COPD Education Initiated: Yes--Short Intervention ([facility] declined long discusion quit>35 years)  DME Company: none  Physician Name: [facility]  Pulmonologist Name: [facility]  Is this a COPD exacerbation patient?: No  $ Demo/Eval of SVN's, MDI's and Aerosols:  (declined)  (OP) Pulmonary Function Testing: Yes ([facility]ru [facility] no Data available)    Meds to Beds  Would the patient like to opt in for Bedside Medication Delivery at Discharge?: Yes, interested     MY COPD ACTION PLAN     It is recommended that patients and physicians /healthcare providers complete this action plan together. This plan should be discussed at each physician visit and updated as needed.    The green, yellow and red zones show groups of symptoms of COPD. This list of symptoms is not comprehensive, and you may experience other symptoms. In the \"Actions\" column, your healthcare provider has recommended actions for you to take based on your symptoms.    Patient Name: Pérez Willams   YOB: 1945   Last Updated on:     Green Zone:  I am doing well today Actions   •  Usual activitiy and exercise level •  Take daily medications   •  Usual amounts of cough and phlegm/mucus •  Use oxygen as prescribed   •  Sleep well at night •  Continue regular exercise/diet plan   •  Appetite is good •  At all times avoid cigarette smoke, inhaled irritants     Daily Medications (these medications are taken every day):             Additional Information:  Take your medications as directed by your Doctor    Yellow Zone:  I am having a bad " "day or a COPD flare Actions   •  More breathless than usual •  Continue daily medications   •  I have less energy for my daily activities •  Use quick relief inhaler as ordered   •  Increased or thicker phlegm/mucus •  Use oxygen as prescribed   •  Using quick relief inhaler/nebulizer more often •  Get plenty of rest   •  Swelling of ankles more than usual •  Use pursed lip breathing   •  More coughing than usual •  At all times avoid cigarette smoke, inhaled irritants   •  I feel like I have a \"chest cold\"   •  Poor sleep and my symptoms woke me up   •  My appetite is not good   •  My medicine is not helping    •  Call provider immediately if symptoms don’t improve     Continue daily medications, add rescue medications:               Medications to be used during a flare up, (as Discussed with Provider):              Red Zone:  I need urgent medical care Actions   •  Severe shortness of breath even at rest •  Call 911 or seek medical care immediately   •  Not able to do any activity because of breathing    •  Fever or shaking chills    •  Feeling confused or very drowsy     •  Chest pains    •  Coughing up blood              "

## 2021-07-04 NOTE — H&P
"Hospital Medicine History & Physical Note    Date of Service  7/4/2021    Primary Care Physician  Hilary Rutledge M.D.    Consultants    Code Status  Full Code    Chief Complaint  No chief complaint on file.    History of Presenting Illness  76M jaifer from [facility] by Dr. Delgado at [phone] c/o RUQ pain x 6 hours with assoc N/V. 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 Otherwise, patient denies sob, cough, chest pain/pressure, congestion, extremity swelling, weakness, unintentional weight changes, fever, chills, hemoptysis, diarrhea, constipation, headaches, dysuria/dyspareunia, polyuria, or polydipsia. Denies recent history/use of malignancy, drug, alcohol, or cannabinoid use.    Notable findings on transfer paperwork include WBC 9.18, Plt 163, Glucose 209 BUN 36, Cr 1.6, , Lipase 16    Home meds listed in transfer doc Fluorouracil, Diclofenac, Gabapentin, Lisinopril, mirtazapine, omeprazole, rosuvastatin, albuterol, alogliptin, amlodipine, budesonide, carvedilol.    I discussed the plan of care with patient.    Review of Systems  ROS  All systems reviewed and negative except as noted in HPI.    Past Medical History   has a past medical history of Arthritis, Cholesterol blood decreased, COPD, mild (HCC), Disc degeneration, Heart burn, High cholesterol, Hypertension, Indigestion, Pneumonia, Psychiatric problem, and Sleep apnea.    Surgical History   has a past surgical history that includes other cardiac surgery (3-4/2012); other abdominal surgery (1987); cervical fusion posterior (8/21/2014); shoulder arthroscopy w/ rotator cuff repair (1991); lumbar laminectomy diskectomy; carpal tunnel release (Left); appendectomy; and lumbar laminectomy diskectomy (8/21/2019).     Family History  family history is not on file.   Family history reviewed with patient. There is no family history that is pertinent to the chief complaint. "     Social History   reports that he quit smoking about 39 years ago. His smoking use included cigarettes. He has a 60.00 pack-year smoking history. He has never used smokeless tobacco. He reports that he does not drink alcohol and does not use drugs.    Allergies  No Known Allergies    Medications  Prior to Admission Medications   Prescriptions Last Dose Informant Patient Reported? Taking?   LISINOPRIL PO   Yes No   Sig: Take  by mouth.   amlodipine (NORVASC) 10 MG TABS  Patient Yes No   Sig: Take 10 mg by mouth every day.   budesonide-formoterol (SYMBICORT) 160-4.5 MCG/ACT Aerosol   Yes Yes   Sig: Inhale 2 Puffs 2 times a day.   carvedilol (COREG) 6.25 MG TABS  Patient Yes No   Sig: Take 6.25 mg by mouth 2 times a day, with meals.   omeprazole (PRILOSEC) 20 MG delayed-release capsule   Yes Yes   Sig: Take 40 mg by mouth 2 times a day.   tiotropium (SPIRIVA) 18 MCG Cap   Yes Yes   Sig: Place 18 mcg into inhaler and inhale every day.      Facility-Administered Medications: None       Physical Exam  Temp:  [37.2 °C (98.9 °F)] 37.2 °C (98.9 °F)  Pulse:  [85] 85  Resp:  [18] 18  BP: (121)/(71) 121/71  SpO2:  [93 %] 93 %    Physical Exam  I have reviewed patients' vitals and Labs    Constitutional: Resting comfortably in NAD   HENT: Normocephalic, no obvious evidence of acute trauma.  Eyes: No scleral icterus. Normal conjunctiva   Neck: Comfortable movement without any obvious restriction in the range of motion.  Cardiovascular: Upon ascultation I appreciate a regular heart rhythm and a normal rate with no murmurs, rubs or gallops  Thorax & Lungs: No respiratory distress. No wheezing, rales or rhonchi heard on ausculation.  there is no obvious chest wall tenderness. I appreciate normal air movement throughout.   Abdomen: The abdomen is not visibly distended. RUQ pain on palpation.  No mass appreciated.  Skin: The exposed portions of skin reveal no obvious rash or other abnormalities.  Extremities/Musculoskeletal: no  lower extremity edema with no asymmetry.  Neurologic: Alert & oriented. No focal deficits observed.   Psychiatric: Normal affect appropriate for the clinical situation.    Laboratory:          No results for input(s): ALTSGPT, ASTSGOT, ALKPHOSPHAT, TBILIRUBIN, DBILIRUBIN, GAMMAGT, AMYLASE, LIPASE, ALB, PREALBUMIN, GLUCOSE in the last 72 hours.      No results for input(s): NTPROBNP in the last 72 hours.      No results for input(s): TROPONINT in the last 72 hours.    Imaging:  [id number]/O    (Results Pending)   DX-CHEST-PORTABLE (1 VIEW)    (Results Pending)       no X-Ray or EKG requiring interpretation    Assessment/Plan:  I anticipate this patient will require at least two midnights for appropriate medical management, necessitating inpatient admission.    Sepsis ([facility])- (present on admission)  Assessment & Plan  This is Sepsis Present on admission  SIRS criteria identified on my evaluation include: Tachycardia, with heart rate greater than 90 BPM and Tachypnea, with respirations greater than 20 per minute  Source is unknown  Sepsis protocol initiated  Fluid resuscitation ordered per protocol  IV antibiotics as appropriate for source of sepsis  While organ dysfunction may be noted elsewhere in this problem list or in the chart, degree of organ dysfunction does not meet CMS criteria for severe sepsis          HTN (hypertension)  Assessment & Plan  Continue Home Medications incl carvedilol  Labetalol ivp prn with parameters      RUQ pain- (present on admission)  Assessment & Plan  Notable findings on transfer paperwork include WBC 9.18, Plt 163, Glucose 209 BUN 36, Cr 1.6, , Lipase 16  CT negative  MRCP  RUQ US  Rocephin/Flagyl      COPD (chronic obstructive pulmonary disease) ([facility])  Assessment & Plan  Chronic, mild, stable      VTE prophylaxis: heparin ppx

## 2021-07-04 NOTE — PROGRESS NOTES
4 Eyes Skin Assessment Completed by BJ Ruth and BJ Hubbard.    Head WDL  Ears WDL  Nose WDL  Mouth WDL - Upper and lower dentures in place  Neck WDL  Breast/Chest WDL  Shoulder Blades WDL  Spine WDL  (R) Arm/Elbow/Hand WDL  (L) Arm/Elbow/Hand WDL  Abdomen WDL  Groin WDL  Scrotum/Coccyx/Buttocks Blanching  (R) Leg WDL - small scattered abrasions   (L) Leg WDL - small scattered abrasions   (R) Heel/Foot/Toe WDL  (L) Heel/Foot/Toe WDL    Interventions In Place NC W/Ear Foams    Possible Skin Injury No    Pictures Uploaded Into Epic N/A  Wound Consult Placed N/A  RN Wound Prevention Protocol Ordered No

## 2021-07-04 NOTE — CARE PLAN
The patient is Stable - Low risk of patient condition declining or worsening    Shift Goals  Clinical Goals: MRI; pain control      Progress made toward(s) clinical / shift goals:  MRI completed, medicated PRN per MAR    Patient is not progressing towards the following goals:

## 2021-07-04 NOTE — PROGRESS NOTES
"Hospital Medicine Daily Progress Note    Date of Service  7/4/2021    Chief Complaint  Pérez Willams is a 76 y.o. male admitted 7/4/2021 with abdominal pain    Hospital Course  76M tsfer from [facility] by Dr. Delgado at [phone] c/o RUQ pain x 6 hours with assoc N/V. 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 Otherwise, patient denies sob, cough, chest pain/pressure, congestion, extremity swelling, weakness, unintentional weight changes, fever, chills, hemoptysis, diarrhea, constipation, headaches, dysuria/dyspareunia, polyuria, or polydipsia. Denies recent history/use of malignancy, drug, alcohol, or cannabinoid use.    Interval Problem Update  7/4/2021: Update  Appreciate general surgery recommendations.  Patient valuated bedside still having residual amounts of abdominal pain physical exam significant for right upper quadrant tenderness palpation positive Parada sign.  Patient pending MRCP.  Ultrasound of the right upper quadrant essentially negative.  There is suspicion that patient may have had gallstone which is may have passed.  We will continue present current conservative therapy.    I have personally seen and examined the patient at bedside. I discussed the plan of care with patient and general surgery.    Consultants/Specialty  general surgery    Code Status  Full Code    Disposition  Patient is not medically cleared.   Anticipate discharge to to home with close outpatient follow-up.  I have placed the appropriate orders for post-discharge needs.    Review of Systems  Review of Systems   Constitutional: Negative for chills, diaphoresis, fever, malaise/fatigue and weight loss.   HENT: Negative for congestion, nosebleeds, sinus pain and sore throat.    Eyes: Negative for double vision, pain, discharge and redness.   Respiratory: Negative for cough, sputum production, shortness of breath, wheezing and stridor.  "   Cardiovascular: Negative for chest pain, palpitations and leg swelling.   Gastrointestinal: Positive for abdominal pain. Negative for blood in stool, diarrhea, nausea and vomiting.   Genitourinary: Negative for flank pain, frequency and urgency.   Musculoskeletal: Negative for back pain, joint pain, myalgias and neck pain.   Skin: Negative for itching and rash.   Neurological: Negative for dizziness, tingling, seizures, weakness and headaches.   Endo/Heme/Allergies: Negative for polydipsia. Does not bruise/bleed easily.   Psychiatric/Behavioral: Negative for memory loss. The patient is not nervous/anxious and does not have insomnia.         Physical Exam  Temp:  [36.8 °C (98.3 °F)-37.7 °C (99.8 °F)] 37.7 °C (99.8 °F)  Pulse:  [70-89] 89  Resp:  [14-18] 14  BP: (121-132)/(70-76) 132/76  SpO2:  [92 %-93 %] 92 %    Physical Exam  Constitutional:       General: He is not in acute distress.     Appearance: Normal appearance. He is not ill-appearing.   HENT:      Head: Normocephalic and atraumatic.      Right Ear: External ear normal.      Left Ear: External ear normal.      Nose: No congestion or rhinorrhea.      Mouth/Throat:      Mouth: Mucous membranes are moist.   Eyes:      Extraocular Movements: Extraocular movements intact.      Pupils: Pupils are equal, round, and reactive to light.   Cardiovascular:      Rate and Rhythm: Normal rate and regular rhythm.      Pulses: Normal pulses.      Heart sounds: No murmur heard.     Pulmonary:      Effort: Pulmonary effort is normal. No respiratory distress.      Breath sounds: No wheezing or rales.   Abdominal:      General: There is no distension.      Tenderness: There is abdominal tenderness. There is guarding.      Comments: Positive Parada sign on palpation of abdomen.   Musculoskeletal:         General: No swelling, tenderness or deformity. Normal range of motion.      Cervical back: Normal range of motion. No rigidity or tenderness.   Skin:     General: Skin is  warm.      Coloration: Skin is not jaundiced or pale.      Findings: No bruising.   Neurological:      General: No focal deficit present.      Mental Status: He is alert and oriented to person, place, and time. Mental status is at baseline.      Cranial Nerves: No cranial nerve deficit.   Psychiatric:         Mood and Affect: Mood normal.         Thought Content: Thought content normal.         Fluids    Intake/Output Summary (Last 24 hours) at 7/4/2021 1518  Last data filed at 7/4/2021 1500  Gross per 24 hour   Intake 0 ml   Output 675 ml   Net -675 ml       Laboratory  Recent Labs     07/04/21  0334   WBC 11.6*   RBC 4.28*   HEMOGLOBIN 13.3*   HEMATOCRIT 38.0*   MCV 88.8   MCH 31.1   MCHC 35.0   RDW 44.6   PLATELETCT 143*   MPV 10.2     Recent Labs     07/04/21  1102   SODIUM 141   POTASSIUM 4.0   CHLORIDE 106   CO2 24   GLUCOSE 120*   BUN 20   CREATININE 1.07   CALCIUM 8.8     Recent Labs     07/04/21  0334   INR 1.20*               Imaging  OUTSIDE IMAGES-CT ABDOMEN /PELVIS   Final Result      US-RUQ   Final Result      1.  Limited exam.      2.  No abnormalities identified      DX-CHEST-PORTABLE (1 VIEW)   Final Result      No acute cardiopulmonary disease.      [id number]/O    (Results Pending)        Assessment/Plan  HTN (hypertension)  Assessment & Plan  Continue Home Medications incl carvedilol  Labetalol ivp prn with parameters      COPD (chronic obstructive pulmonary disease) (HCC)  Assessment & Plan  Chronic, mild, stable  COPD educator recommendation appreciated.    Sepsis (HCC)- (present on admission)  Assessment & Plan  This is Sepsis Present on admission  SIRS criteria identified on my evaluation include: Tachycardia, with heart rate greater than 90 BPM and Tachypnea, with respirations greater than 20 per minute  Source is unknown  Sepsis protocol initiated  Fluid resuscitation ordered per protocol  IV antibiotics as appropriate for source of sepsis  While organ dysfunction may be noted elsewhere  in this problem list or in the chart, degree of organ dysfunction does not meet CMS criteria for severe sepsis  7/4/2021:  -Resolving          RUQ pain- (present on admission)  Assessment & Plan  Notable findings on transfer paperwork include WBC 9.18, Plt 163, Glucose 209 BUN 36, Cr 1.6, , Lipase 16  CT negative  MRCP  RUQ US  Rocephin/Flagyl  7/4/2021:  Right upper quadrant ultrasound essentially negative.  Patient pending MRCP.  General surgery recommendations appreciated.  Continue present medications and antimicrobials as indicated above.  Will reevaluate on 7/5/2021.  No indication for surgery at present.         VTE prophylaxis: SCDs/TEDs and heparin ppx    I have performed a physical exam and reviewed and updated ROS and Plan today (7/4/2021). In review of yesterday's note (7/3/2021), there are no changes except as documented above.

## 2021-07-04 NOTE — PROGRESS NOTES
Patient arrived to [facility]-1 via EMS as a direct admit  Armband and labels ordered from admitting, Hospitalist paged, Med rec complete    VSS  Patient able to ambulate from gurney to bed with no assistance and a steady gait     Pt A&Ox4  Patient answers all questions appropriately and follows all instructions   Patient oriented to floor/unit and call light explained     Patient rates pain as 4/10, Patient resting comfortably in bed     Denies n/v at this time, patient reports N/V during day prompting hospital visit. +bowel sounds, +flatus, LBM PTA     Saturating >90% on room air   Patient denies SOB     Previous hospital had patient on 2L via NC, patient reports not using any oxygen at home     Pt ambulates independently with a steady gait   Patient verbalizes understanding to call when needing assistance     Updated on plan of care. Safety education provided. Bed locked in low. Call light within reach. Rounding in place.

## 2021-07-04 NOTE — PROGRESS NOTES
Transfer Center Note    [facility] HOSPITALIST TRIAGE OFFICER DIRECT ADMISSION REPORT  Transferring facility: [facility]  Transferring physician: Dr HERNANDEZ  Transferring facility/physician contact number: [phone]  Chief complaint: Right upper quadrant pain  Pertinent history & patient course: 6 hours of right upper quadrant fever, pain nausea and vomiting  Pertinent imaging & lab results: Bilirubin 3.0, lactic acid 2.8  Code Status: Full code per transferring provider, I personally verified with the transferring provider patient's code status and the transferring provider has confirmed this with the patient.  Further work up or recommendations per triage officer prior to transfer: None  Consultants called prior to transfer and pertinent input from consultants: None  Patient accepted for transfer: Yes  Consultants to be called upon arrival: Hospitalist triage officer  Admission status: Inpatient.   Floor requested: [facility]  If ICU transfer, name of intensivist case discussed with and pertinent input from critical care:     Please inform the triage officer upon arrival of the patient to [facility] for assignment of a hospitalist to perform admission.     For any question or concerns regarding the care of this patient, please reach out to the assigned hospitalist.

## 2021-07-04 NOTE — PROGRESS NOTES
Bedside report received. Assessment completed.  Pt is A&O x4. Pt on room air.   Medicating for pain PRN per MAR Pain 5/10  Denies nausea.   - numbness, - tingling.  Skin Abrasions to R shin   LDA 20LFA   Last BM 7/4/21 . +flatus,   +void.  NPO at this time.  Pt up self.  Call light and belongings within reach. All needs met at this time. Fall Precautions and hourly rounding in place.

## 2021-07-04 NOTE — ASSESSMENT & PLAN NOTE
Notable findings on transfer paperwork include WBC 9.18, Plt 163, Glucose 209 BUN 36, Cr 1.6, , Lipase 16  CT negative  MRCP  RUQ US  Rocephin/Flagyl  7/4/2021:  Right upper quadrant ultrasound essentially negative.  Patient pending MRCP.  General surgery recommendations appreciated.  Continue present medications and antimicrobials as indicated above.  Will reevaluate on 7/5/2021.  No indication for surgery at present.  7/5/2021:  Status post ERCP because of common bile duct partial obstruction from gallstone.  Patient tolerated procedure well continue with postoperative management

## 2021-07-05 ENCOUNTER — ANESTHESIA EVENT (OUTPATIENT)
Dept: SURGERY | Facility: MEDICAL CENTER | Age: 76
DRG: 854 | End: 2021-07-05
Payer: COMMERCIAL

## 2021-07-05 ENCOUNTER — APPOINTMENT (OUTPATIENT)
Dept: RADIOLOGY | Facility: MEDICAL CENTER | Age: 76
DRG: 854 | End: 2021-07-05
Attending: INTERNAL MEDICINE
Payer: COMMERCIAL

## 2021-07-05 ENCOUNTER — ANESTHESIA (OUTPATIENT)
Dept: SURGERY | Facility: MEDICAL CENTER | Age: 76
DRG: 854 | End: 2021-07-05
Payer: COMMERCIAL

## 2021-07-05 PROBLEM — K80.43 CHOLEDOCHOLITHIASIS WITH ACUTE CHOLECYSTITIS WITH OBSTRUCTION: Status: ACTIVE | Noted: 2021-07-05

## 2021-07-05 LAB
ALBUMIN SERPL BCP-MCNC: 3.3 G/DL (ref 3.2–4.9)
ALBUMIN/GLOB SERPL: 1.3 G/DL
ALP SERPL-CCNC: 264 U/L (ref 30–99)
ALT SERPL-CCNC: 159 U/L (ref 2–50)
ANION GAP SERPL CALC-SCNC: 12 MMOL/L (ref 7–16)
AST SERPL-CCNC: 109 U/L (ref 12–45)
BILIRUB SERPL-MCNC: 4.1 MG/DL (ref 0.1–1.5)
BUN SERPL-MCNC: 18 MG/DL (ref 8–22)
CALCIUM SERPL-MCNC: 8.5 MG/DL (ref 8.5–10.5)
CHLORIDE SERPL-SCNC: 102 MMOL/L (ref 96–112)
CHOLEST SERPL-MCNC: 108 MG/DL (ref 100–199)
CO2 SERPL-SCNC: 22 MMOL/L (ref 20–33)
CREAT SERPL-MCNC: 1.03 MG/DL (ref 0.5–1.4)
ERYTHROCYTE [DISTWIDTH] IN BLOOD BY AUTOMATED COUNT: 44.7 FL (ref 35.9–50)
GLOBULIN SER CALC-MCNC: 2.6 G/DL (ref 1.9–3.5)
GLUCOSE SERPL-MCNC: 88 MG/DL (ref 65–99)
HCT VFR BLD AUTO: 38.1 % (ref 42–52)
HDLC SERPL-MCNC: 21 MG/DL
HGB BLD-MCNC: 13 G/DL (ref 14–18)
LDLC SERPL CALC-MCNC: 66 MG/DL
MCH RBC QN AUTO: 30.5 PG (ref 27–33)
MCHC RBC AUTO-ENTMCNC: 34.1 G/DL (ref 33.7–35.3)
MCV RBC AUTO: 89.4 FL (ref 81.4–97.8)
PLATELET # BLD AUTO: 141 K/UL (ref 164–446)
PMV BLD AUTO: 10.6 FL (ref 9–12.9)
POTASSIUM SERPL-SCNC: 3.7 MMOL/L (ref 3.6–5.5)
PROT SERPL-MCNC: 5.9 G/DL (ref 6–8.2)
RBC # BLD AUTO: 4.26 M/UL (ref 4.7–6.1)
SARS-COV+SARS-COV-2 AG RESP QL IA.RAPID: NOTDETECTED
SODIUM SERPL-SCNC: 136 MMOL/L (ref 135–145)
SPECIMEN SOURCE: NORMAL
TRIGL SERPL-MCNC: 107 MG/DL (ref 0–149)
WBC # BLD AUTO: 9.4 K/UL (ref 4.8–10.8)

## 2021-07-05 PROCEDURE — 502240 HCHG MISC OR SUPPLY RC 0272: Performed by: INTERNAL MEDICINE

## 2021-07-05 PROCEDURE — 99233 SBSQ HOSP IP/OBS HIGH 50: CPT | Performed by: STUDENT IN AN ORGANIZED HEALTH CARE EDUCATION/TRAINING PROGRAM

## 2021-07-05 PROCEDURE — 160035 HCHG PACU - 1ST 60 MINS PHASE I: Performed by: INTERNAL MEDICINE

## 2021-07-05 PROCEDURE — 160002 HCHG RECOVERY MINUTES (STAT): Performed by: INTERNAL MEDICINE

## 2021-07-05 PROCEDURE — 700102 HCHG RX REV CODE 250 W/ 637 OVERRIDE(OP): Performed by: STUDENT IN AN ORGANIZED HEALTH CARE EDUCATION/TRAINING PROGRAM

## 2021-07-05 PROCEDURE — A9270 NON-COVERED ITEM OR SERVICE: HCPCS | Performed by: STUDENT IN AN ORGANIZED HEALTH CARE EDUCATION/TRAINING PROGRAM

## 2021-07-05 PROCEDURE — 87426 SARSCOV CORONAVIRUS AG IA: CPT

## 2021-07-05 PROCEDURE — 80053 COMPREHEN METABOLIC PANEL: CPT

## 2021-07-05 PROCEDURE — 770006 HCHG ROOM/CARE - MED/SURG/GYN SEMI*

## 2021-07-05 PROCEDURE — A9270 NON-COVERED ITEM OR SERVICE: HCPCS | Performed by: INTERNAL MEDICINE

## 2021-07-05 PROCEDURE — 700101 HCHG RX REV CODE 250: Performed by: ANESTHESIOLOGY

## 2021-07-05 PROCEDURE — 36415 COLL VENOUS BLD VENIPUNCTURE: CPT

## 2021-07-05 PROCEDURE — 700101 HCHG RX REV CODE 250: Performed by: STUDENT IN AN ORGANIZED HEALTH CARE EDUCATION/TRAINING PROGRAM

## 2021-07-05 PROCEDURE — 160048 HCHG OR STATISTICAL LEVEL 1-5: Performed by: INTERNAL MEDICINE

## 2021-07-05 PROCEDURE — 80061 LIPID PANEL: CPT

## 2021-07-05 PROCEDURE — 110371 HCHG SHELL REV 272: Performed by: INTERNAL MEDICINE

## 2021-07-05 PROCEDURE — 160036 HCHG PACU - EA ADDL 30 MINS PHASE I: Performed by: INTERNAL MEDICINE

## 2021-07-05 PROCEDURE — 700111 HCHG RX REV CODE 636 W/ 250 OVERRIDE (IP): Performed by: STUDENT IN AN ORGANIZED HEALTH CARE EDUCATION/TRAINING PROGRAM

## 2021-07-05 PROCEDURE — 160203 HCHG ENDO MINUTES - 1ST 30 MINS LEVEL 4: Performed by: INTERNAL MEDICINE

## 2021-07-05 PROCEDURE — BF131ZZ FLUOROSCOPY OF GALLBLADDER AND BILE DUCTS USING LOW OSMOLAR CONTRAST: ICD-10-PCS | Performed by: INTERNAL MEDICINE

## 2021-07-05 PROCEDURE — 700102 HCHG RX REV CODE 250 W/ 637 OVERRIDE(OP): Performed by: INTERNAL MEDICINE

## 2021-07-05 PROCEDURE — 0FC98ZZ EXTIRPATION OF MATTER FROM COMMON BILE DUCT, VIA NATURAL OR ARTIFICIAL OPENING ENDOSCOPIC: ICD-10-PCS | Performed by: INTERNAL MEDICINE

## 2021-07-05 PROCEDURE — 700105 HCHG RX REV CODE 258: Performed by: STUDENT IN AN ORGANIZED HEALTH CARE EDUCATION/TRAINING PROGRAM

## 2021-07-05 PROCEDURE — 700117 HCHG RX CONTRAST REV CODE 255: Performed by: INTERNAL MEDICINE

## 2021-07-05 PROCEDURE — 700111 HCHG RX REV CODE 636 W/ 250 OVERRIDE (IP): Performed by: ANESTHESIOLOGY

## 2021-07-05 PROCEDURE — 85027 COMPLETE CBC AUTOMATED: CPT

## 2021-07-05 PROCEDURE — 160009 HCHG ANES TIME/MIN: Performed by: INTERNAL MEDICINE

## 2021-07-05 RX ORDER — INDOMETHACIN 50 MG/1
100 SUPPOSITORY RECTAL ONCE
Status: COMPLETED | OUTPATIENT
Start: 2021-07-05 | End: 2021-07-05

## 2021-07-05 RX ORDER — MEPERIDINE HYDROCHLORIDE 25 MG/ML
12.5 INJECTION INTRAMUSCULAR; INTRAVENOUS; SUBCUTANEOUS
Status: DISCONTINUED | OUTPATIENT
Start: 2021-07-05 | End: 2021-07-05 | Stop reason: HOSPADM

## 2021-07-05 RX ORDER — ONDANSETRON 2 MG/ML
INJECTION INTRAMUSCULAR; INTRAVENOUS PRN
Status: DISCONTINUED | OUTPATIENT
Start: 2021-07-05 | End: 2021-07-05 | Stop reason: SURG

## 2021-07-05 RX ORDER — HYDROMORPHONE HYDROCHLORIDE 1 MG/ML
0.4 INJECTION, SOLUTION INTRAMUSCULAR; INTRAVENOUS; SUBCUTANEOUS
Status: DISCONTINUED | OUTPATIENT
Start: 2021-07-05 | End: 2021-07-05 | Stop reason: HOSPADM

## 2021-07-05 RX ORDER — METRONIDAZOLE 500 MG/1
500 TABLET ORAL EVERY 8 HOURS
Status: DISCONTINUED | OUTPATIENT
Start: 2021-07-05 | End: 2021-07-09 | Stop reason: HOSPADM

## 2021-07-05 RX ORDER — OXYCODONE HCL 5 MG/5 ML
5 SOLUTION, ORAL ORAL
Status: DISCONTINUED | OUTPATIENT
Start: 2021-07-05 | End: 2021-07-05 | Stop reason: HOSPADM

## 2021-07-05 RX ORDER — DEXAMETHASONE SODIUM PHOSPHATE 4 MG/ML
INJECTION, SOLUTION INTRA-ARTICULAR; INTRALESIONAL; INTRAMUSCULAR; INTRAVENOUS; SOFT TISSUE PRN
Status: DISCONTINUED | OUTPATIENT
Start: 2021-07-05 | End: 2021-07-05 | Stop reason: SURG

## 2021-07-05 RX ORDER — CEFAZOLIN SODIUM 1 G/3ML
INJECTION, POWDER, FOR SOLUTION INTRAMUSCULAR; INTRAVENOUS PRN
Status: DISCONTINUED | OUTPATIENT
Start: 2021-07-05 | End: 2021-07-05 | Stop reason: SURG

## 2021-07-05 RX ORDER — OXYCODONE HCL 5 MG/5 ML
10 SOLUTION, ORAL ORAL
Status: DISCONTINUED | OUTPATIENT
Start: 2021-07-05 | End: 2021-07-05 | Stop reason: HOSPADM

## 2021-07-05 RX ORDER — LIDOCAINE HYDROCHLORIDE 20 MG/ML
INJECTION, SOLUTION EPIDURAL; INFILTRATION; INTRACAUDAL; PERINEURAL PRN
Status: DISCONTINUED | OUTPATIENT
Start: 2021-07-05 | End: 2021-07-05 | Stop reason: SURG

## 2021-07-05 RX ORDER — HYDROMORPHONE HYDROCHLORIDE 1 MG/ML
0.2 INJECTION, SOLUTION INTRAMUSCULAR; INTRAVENOUS; SUBCUTANEOUS
Status: DISCONTINUED | OUTPATIENT
Start: 2021-07-05 | End: 2021-07-05 | Stop reason: HOSPADM

## 2021-07-05 RX ORDER — HALOPERIDOL 5 MG/ML
1 INJECTION INTRAMUSCULAR
Status: DISCONTINUED | OUTPATIENT
Start: 2021-07-05 | End: 2021-07-05 | Stop reason: HOSPADM

## 2021-07-05 RX ORDER — DIPHENHYDRAMINE HYDROCHLORIDE 50 MG/ML
12.5 INJECTION INTRAMUSCULAR; INTRAVENOUS
Status: DISCONTINUED | OUTPATIENT
Start: 2021-07-05 | End: 2021-07-05 | Stop reason: HOSPADM

## 2021-07-05 RX ORDER — ONDANSETRON 2 MG/ML
4 INJECTION INTRAMUSCULAR; INTRAVENOUS
Status: DISCONTINUED | OUTPATIENT
Start: 2021-07-05 | End: 2021-07-05 | Stop reason: HOSPADM

## 2021-07-05 RX ORDER — KETOROLAC TROMETHAMINE 30 MG/ML
INJECTION, SOLUTION INTRAMUSCULAR; INTRAVENOUS PRN
Status: DISCONTINUED | OUTPATIENT
Start: 2021-07-05 | End: 2021-07-05 | Stop reason: SURG

## 2021-07-05 RX ORDER — HYDROMORPHONE HYDROCHLORIDE 1 MG/ML
0.1 INJECTION, SOLUTION INTRAMUSCULAR; INTRAVENOUS; SUBCUTANEOUS
Status: DISCONTINUED | OUTPATIENT
Start: 2021-07-05 | End: 2021-07-05 | Stop reason: HOSPADM

## 2021-07-05 RX ADMIN — LIDOCAINE HYDROCHLORIDE 100 MG: 20 INJECTION, SOLUTION EPIDURAL; INFILTRATION; INTRACAUDAL at 14:40

## 2021-07-05 RX ADMIN — METRONIDAZOLE 500 MG: 500 TABLET ORAL at 21:17

## 2021-07-05 RX ADMIN — AMLODIPINE BESYLATE 10 MG: 10 TABLET ORAL at 04:33

## 2021-07-05 RX ADMIN — PROPOFOL 150 MG: 10 INJECTION, EMULSION INTRAVENOUS at 14:40

## 2021-07-05 RX ADMIN — OXYCODONE 5 MG: 5 TABLET ORAL at 04:36

## 2021-07-05 RX ADMIN — CEFAZOLIN 2 G: 330 INJECTION, POWDER, FOR SOLUTION INTRAMUSCULAR; INTRAVENOUS at 14:34

## 2021-07-05 RX ADMIN — CEFTRIAXONE SODIUM 2 G: 10 INJECTION, POWDER, FOR SOLUTION INTRAVENOUS at 04:33

## 2021-07-05 RX ADMIN — CARVEDILOL 6.25 MG: 6.25 TABLET, FILM COATED ORAL at 09:13

## 2021-07-05 RX ADMIN — OXYCODONE 5 MG: 5 TABLET ORAL at 09:18

## 2021-07-05 RX ADMIN — ONDANSETRON 4 MG: 2 INJECTION INTRAMUSCULAR; INTRAVENOUS at 14:40

## 2021-07-05 RX ADMIN — KETOROLAC TROMETHAMINE 30 MG: 30 INJECTION, SOLUTION INTRAMUSCULAR at 14:49

## 2021-07-05 RX ADMIN — OMEPRAZOLE 40 MG: 20 CAPSULE, DELAYED RELEASE ORAL at 04:33

## 2021-07-05 RX ADMIN — CARVEDILOL 6.25 MG: 6.25 TABLET, FILM COATED ORAL at 17:17

## 2021-07-05 RX ADMIN — ONDANSETRON 4 MG: 4 TABLET, ORALLY DISINTEGRATING ORAL at 21:17

## 2021-07-05 RX ADMIN — OMEPRAZOLE 40 MG: 20 CAPSULE, DELAYED RELEASE ORAL at 17:17

## 2021-07-05 RX ADMIN — DEXAMETHASONE SODIUM PHOSPHATE 4 MG: 4 INJECTION, SOLUTION INTRA-ARTICULAR; INTRALESIONAL; INTRAMUSCULAR; INTRAVENOUS; SOFT TISSUE at 14:40

## 2021-07-05 RX ADMIN — INDOMETHACIN 100 MG: 50 SUPPOSITORY RECTAL at 15:27

## 2021-07-05 RX ADMIN — SODIUM CHLORIDE: 9 INJECTION, SOLUTION INTRAVENOUS at 17:17

## 2021-07-05 RX ADMIN — METRONIDAZOLE 500 MG: 500 INJECTION, SOLUTION INTRAVENOUS at 17:17

## 2021-07-05 RX ADMIN — SUGAMMADEX 200 MG: 100 INJECTION, SOLUTION INTRAVENOUS at 14:59

## 2021-07-05 RX ADMIN — LISINOPRIL 2.5 MG: 5 TABLET ORAL at 04:33

## 2021-07-05 RX ADMIN — METRONIDAZOLE 500 MG: 500 INJECTION, SOLUTION INTRAVENOUS at 04:33

## 2021-07-05 RX ADMIN — OXYCODONE 5 MG: 5 TABLET ORAL at 19:33

## 2021-07-05 RX ADMIN — ROCURONIUM BROMIDE 100 MG: 10 INJECTION, SOLUTION INTRAVENOUS at 14:40

## 2021-07-05 ASSESSMENT — ENCOUNTER SYMPTOMS
CONSTIPATION: 0
ABDOMINAL PAIN: 1
POLYDIPSIA: 0
COUGH: 0
VOMITING: 1
VOMITING: 0
DIARRHEA: 0
MEMORY LOSS: 0
TINGLING: 0
BRUISES/BLEEDS EASILY: 0
PALPITATIONS: 0
HEADACHES: 0
TREMORS: 0
SINUS PAIN: 0
FALLS: 0
CLAUDICATION: 0
DIAPHORESIS: 0
MYALGIAS: 0
SORE THROAT: 0
PND: 0
WEAKNESS: 0
EYE DISCHARGE: 0
CHILLS: 1
WHEEZING: 0
NECK PAIN: 0
STRIDOR: 0
SHORTNESS OF BREATH: 0
BLOOD IN STOOL: 0
DIZZINESS: 0
DOUBLE VISION: 0
SEIZURES: 0
FLANK PAIN: 0
INSOMNIA: 0
NAUSEA: 1
EYE PAIN: 0
SPUTUM PRODUCTION: 0
CHILLS: 0
NAUSEA: 0
WEIGHT LOSS: 0
FEVER: 0
BACK PAIN: 0
NERVOUS/ANXIOUS: 0
EYE REDNESS: 0

## 2021-07-05 ASSESSMENT — PAIN DESCRIPTION - PAIN TYPE
TYPE: ACUTE PAIN

## 2021-07-05 ASSESSMENT — PAIN SCALES - GENERAL: PAIN_LEVEL: 2

## 2021-07-05 NOTE — CARE PLAN
Problem: Knowledge Deficit - Standard  Goal: Patient and family/care givers will demonstrate understanding of plan of care, disease process/condition, diagnostic tests and medications  Outcome: Progressing     Problem: Hemodynamics  Goal: Patient's hemodynamics, fluid balance and neurologic status will be stable or improve  Outcome: Progressing     Problem: Fluid Volume  Goal: Fluid volume balance will be maintained  Outcome: Progressing   The patient is Stable - Low risk of patient condition declining or worsening    Shift Goals  Clinical Goals: Pain control  Patient Goals: rest pain control    Progress made toward(s) clinical / shift goals:  ercp planned for today    Patient is not progressing towards the following goals:

## 2021-07-05 NOTE — CONSULTS
Gastroenterology Initial Consult Note               Author:  TINA Delcid Date & Time Created: 7/5/2021 10:11 AM       Patient ID:  Name:             Pérez Willams  YOB: 1945  Age:                 76 y.o.  male  MRN:               [id number]      Referring Provider:  Todd Carballo MD      Presenting Chief Complaint:  Abdominal pain, choledocholithiasis      History of Present Illness:    He is a 76-year-old male patient seen in consultation for abdominal pain and findings of choledocholithiasis.  He was transferred from the [facility] yesterday with complaints of right upper quadrant pain described as a sharp pain radiating to his back that lasted about 6 hours associated with an episode of nausea and vomiting.  He reports feeling like he had chills but no fever.  Upon evaluation he was found to have elevated liver enzymes with , , alkaline phosphatase 322, bilirubin 5.8.  This morning LFTs improved and bilirubin is 4.1.  Initial ultrasound of the right upper quadrant was limited, but did not demonstrate any significant abnormalities.  MRCP was performed and demonstrated distal common bile duct 6 mm stone with CBD measurement 6 mm.  Gallbladder appeared normal.      Review of Systems:  Review of Systems   Constitutional: Positive for chills. Negative for fever.   HENT: Negative for sinus pain and sore throat.    Eyes: Negative for pain, discharge and redness.   Respiratory: Negative for cough, shortness of breath, wheezing and stridor.    Cardiovascular: Negative for claudication, leg swelling and PND.   Gastrointestinal: Positive for abdominal pain, nausea and vomiting. Negative for constipation and diarrhea.   Genitourinary: Negative for flank pain and hematuria.   Musculoskeletal: Positive for joint pain. Negative for falls and myalgias.   Skin: Negative for itching and rash.   Neurological: Negative for dizziness, tingling, tremors and headaches.    Endo/Heme/Allergies: Negative for environmental allergies and polydipsia.   Psychiatric/Behavioral: Negative for memory loss. The patient does not have insomnia.              Past Medical History:  Past Medical History:   Diagnosis Date   • Arthritis    • Cholesterol blood decreased    • COPD, mild (HCC)    • Disc degeneration    • Heart burn    • High cholesterol    • Hypertension    • Indigestion    • Pneumonia    • Psychiatric problem     PTSD   • Sleep apnea     DOES NOT WEAR CPAP     Active Hospital Problems    Diagnosis    • RUQ pain [R10.11]    • Sepsis (HCC) [A41.9]    • COPD (chronic obstructive pulmonary disease) (HCC) [J44.9]    • HTN (hypertension) [I10]          Past Surgical History:  Past Surgical History:   Procedure Laterality Date   • LUMBAR LAMINECTOMY DISKECTOMY  8/21/2019    Procedure: LAMINECTOMY, SPINE, LUMBAR, WITH [id number];  Surgeon: Sukhwinder Peralta M.D.;  Location: [facility];  Service: Neurosurgery   • CERVICAL FUSION POSTERIOR  8/21/2014    Performed by Sukhwinder Peralta M.D. at SURGERY [facility]   • SHOULDER ARTHROSCOPY W/ ROTATOR CUFF REPAIR  1991    LEFT   • OTHER ABDOMINAL SURGERY  1987    HEMORRHOIDECTOMY   • APPENDECTOMY     • CARPAL TUNNEL RELEASE Left    • LUMBAR LAMINECTOMY DISKECTOMY     • OTHER CARDIAC SURGERY  3-4/2012    CARDIAC STENTS X 2           Hospital Medications:  Current Facility-Administered Medications   Medication Dose Frequency Provider Last Rate Last Admin   • amLODIPine (NORVASC) tablet 10 mg  10 mg DAILY Reji Alva M.D.   10 mg at 07/05/21 0433   • carvedilol (COREG) tablet 6.25 mg  6.25 mg BID WITH MEALS Reji Alva M.D.   6.25 mg at 07/05/21 0913   • lisinopril (PRINIVIL) tablet 2.5 mg  2.5 mg Q DAY Reji Alva M.D.   2.5 mg at 07/05/21 0433   • omeprazole (PRILOSEC) capsule 40 mg  40 mg BID Reji Alva M.D.   40 mg at 07/05/21 0433   • senna-docusate (PERICOLACE or SENOKOT S) 8.6-50 MG per  tablet 2 tablet  2 tablet BID Reji Alva M.D.        And   • polyethylene glycol/lytes (MIRALAX) PACKET 1 Packet  1 Packet QDAY PRN Reji Alva M.D.        And   • magnesium hydroxide (MILK OF MAGNESIA) suspension 30 mL  30 mL QDAY PRN Reji Alva M.D.        And   • bisacodyl (DULCOLAX) suppository 10 mg  10 mg QDAY PRN Reji Alva M.D.       • heparin injection 5,000 Units  5,000 Units Q8HRS Reji Alva M.D.       • enalaprilat (VASOTEC) injection 1.25 mg  1.25 mg Q6HRS PRN Reji Alva M.D.       • labetalol (NORMODYNE/TRANDATE) injection 10 mg  10 mg Q4HRS PRN Reji Alva M.D.       • cefTRIAXone (Rocephin) syringe 2 g  2 g Q24HR Reji Alva M.D.   2 g at 07/05/21 0433   • metroNIDAZOLE (FLAGYL) IVPB 500 mg  500 mg Q8HRS Reji Alva M.D.   Stopped at 07/05/21 0533   • ondansetron (ZOFRAN) syringe/vial injection 4 mg  4 mg Q4HRS PRN Reji Alva M.D.       • ondansetron (ZOFRAN ODT) dispertab 4 mg  4 mg Q4HRS PRN Reji Alva M.D.       • NS infusion   Continuous Reji Alva M.D. 75 mL/hr at 07/04/21 0556 New Bag at 07/04/21 0556   • Pharmacy Consult Request ...Pain Management Review 1 Each  1 Each PHARMACY TO DOSE Todd Carballo M.D.       • oxyCODONE immediate-release (ROXICODONE) tablet 2.5 mg  2.5 mg Q3HRS PRN Todd Carballo M.D.        Or   • oxyCODONE immediate-release (ROXICODONE) tablet 5 mg  5 mg Q3HRS PRN Todd Carballo M.D.   5 mg at 07/05/21 0918    Or   • HYDROmorphone (Dilaudid) injection 0.25 mg  0.25 mg Q3HRS PRN Todd Carballo M.D.       Last reviewed on 7/4/2021  3:12 AM by Flory Fernando R.N.        Current Outpatient Medications:  Medications Prior to Admission   Medication Sig Dispense Refill Last Dose   • budesonide-formoterol (SYMBICORT) 160-4.5 MCG/ACT Aerosol Inhale 2 Puffs 2 times a day.      • tiotropium (SPIRIVA) 18 MCG Cap Place 18 mcg into inhaler and inhale every day.      •  omeprazole (PRILOSEC) 20 MG delayed-release capsule Take 40 mg by mouth 2 times a day.      • LISINOPRIL PO Take  by mouth.      • amlodipine (NORVASC) 10 MG TABS Take 10 mg by mouth every day.      • carvedilol (COREG) 6.25 MG TABS Take 6.25 mg by mouth 2 times a day, with meals.            Medication Allergies:  No Known Allergies      Family Medical History:  No family history on file.      Social History:  Social History     Socioeconomic History   • Marital status:      Spouse name: Not on file   • Number of children: Not on file   • Years of education: Not on file   • Highest education level: Not on file   Occupational History   • Not on file   Tobacco Use   • Smoking status: Former Smoker     Packs/day: 3.00     Years: 20.00     Pack years: 60.00     Types: Cigarettes     Quit date: 1982     Years since quittin.5   • Smokeless tobacco: Never Used   Vaping Use   • Vaping Use: Never used   Substance and Sexual Activity   • Alcohol use: No     Comment: SOBER FOR 33 YEARS   • Drug use: No   • Sexual activity: Not on file   Other Topics Concern   • Not on file   Social History Narrative   • Not on file     Social Determinants of Health     Financial Resource Strain:    • Difficulty of Paying Living Expenses:    Food Insecurity:    • Worried About Running Out of Food in the Last Year:    • Ran Out of Food in the Last Year:    Transportation Needs:    • Lack of Transportation (Medical):    • Lack of Transportation (Non-Medical):    Physical Activity:    • Days of Exercise per Week:    • Minutes of Exercise per Session:    Stress:    • Feeling of Stress :    Social Connections:    • Frequency of Communication with Friends and Family:    • Frequency of Social Gatherings with Friends and Family:    • Attends [employer] Services:    • Active Member of Clubs or Organizations:    • Attends Club or Organization Meetings:    • Marital Status:    Intimate Partner Violence:    • Fear of Current or Ex-Partner:   "  • Emotionally Abused:    • Physically Abused:    • Sexually Abused:          Vital signs:  Weight/BMI: Body mass index is 29.42 kg/m².  /56   Pulse 86   Temp 37.3 °C (99.2 °F) (Temporal)   Resp 17   Ht 1.778 m (5' 10\")   Wt 93 kg (205 lb 0.4 oz)   SpO2 92%   Vitals:    07/04/21 1917 07/04/21 2327 07/05/21 0419 07/05/21 0815   BP: 141/75 118/56 131/70 115/56   Pulse: 87 82 87 86   Resp: 18 18 18 17   Temp: 36.8 °C (98.3 °F) 37.4 °C (99.3 °F) 37.7 °C (99.8 °F) 37.3 °C (99.2 °F)   TempSrc: Oral Temporal Temporal Temporal   SpO2: 90% 91% 90% 92%   Weight:       Height:         Oxygen Therapy:  Pulse Oximetry: 92 %, O2 (LPM): 0, O2 Delivery Device: None - Room Air    Intake/Output Summary (Last 24 hours) at 7/5/2021 1011  Last data filed at 7/5/2021 0815  Gross per 24 hour   Intake 1855 ml   Output 2020 ml   Net -165 ml         Physical Exam:  Physical Exam  Vitals and nursing note reviewed.   Constitutional:       General: He is not in acute distress.     Appearance: Normal appearance.   HENT:      Head: Normocephalic.      Right Ear: External ear normal.      Left Ear: External ear normal.      Nose: Nose normal.      Mouth/Throat:      Mouth: Mucous membranes are moist.      Pharynx: Oropharynx is clear.   Eyes:      General: No scleral icterus.     Extraocular Movements: Extraocular movements intact.      Pupils: Pupils are equal, round, and reactive to light.   Cardiovascular:      Rate and Rhythm: Normal rate and regular rhythm.      Pulses: Normal pulses.      Heart sounds: Normal heart sounds. No murmur heard.     Pulmonary:      Effort: Pulmonary effort is normal.      Breath sounds: Normal breath sounds. No wheezing or rales.   Abdominal:      General: Abdomen is flat. Bowel sounds are normal.      Palpations: Abdomen is soft.      Tenderness: There is abdominal tenderness (RUQ).   Musculoskeletal:      Cervical back: Neck supple.      Right lower leg: No edema.      Left lower leg: No edema. "   Lymphadenopathy:      Cervical: No cervical adenopathy.   Skin:     General: Skin is warm and dry.      Capillary Refill: Capillary refill takes less than 2 seconds.      Coloration: Skin is not jaundiced.   Neurological:      General: No focal deficit present.      Mental Status: He is alert and oriented to person, place, and time.   Psychiatric:         Thought Content: Thought content normal.         Judgment: Judgment normal.         Labs:  Recent Labs     07/04/21  1102 07/05/21 0432   SODIUM 141 136   POTASSIUM 4.0 3.7   CHLORIDE 106 102   CO2 24 22   BUN 20 18   CREATININE 1.07 1.03   CALCIUM 8.8 8.5     Recent Labs     07/04/21  1102 07/05/21 0432   ALTSGPT 246* 159*   ASTSGOT 254* 109*   ALKPHOSPHAT 322* 264*   TBILIRUBIN 5.8* 4.1*   GAMMAGT 671*  --    GLUCOSE 120* 88     Recent Labs     07/04/21  0334 07/04/21 1102 07/05/21 0432   WBC 11.6*  --  9.4   NEUTSPOLYS 86.60*  --   --    LYMPHOCYTES 5.10*  --   --    MONOCYTES 7.30  --   --    EOSINOPHILS 0.30  --   --    BASOPHILS 0.30  --   --    ASTSGOT  --  254* 109*   ALTSGPT  --  246* 159*   ALKPHOSPHAT  --  322* 264*   TBILIRUBIN  --  5.8* 4.1*     Recent Labs     07/04/21 0334 07/05/21 0432   RBC 4.28* 4.26*   HEMOGLOBIN 13.3* 13.0*   HEMATOCRIT 38.0* 38.1*   PLATELETCT 143* 141*   PROTHROMBTM 14.8*  --    INR 1.20*  --      Recent Results (from the past 24 hour(s))   Lactic Acid Every four hours after STAT order    Collection Time: 07/04/21 11:02 AM   Result Value Ref Range    Lactic Acid 1.3 0.5 - 2.0 mmol/L   GAMMA GT (GGT)    Collection Time: 07/04/21 11:02 AM   Result Value Ref Range    Gamma Gt 671 (H) 7 - 51 U/L   Comp Metabolic Panel    Collection Time: 07/04/21 11:02 AM   Result Value Ref Range    Sodium 141 135 - 145 mmol/L    Potassium 4.0 3.6 - 5.5 mmol/L    Chloride 106 96 - 112 mmol/L    Co2 24 20 - 33 mmol/L    Anion Gap 11.0 7.0 - 16.0    Glucose 120 (H) 65 - 99 mg/dL    Bun 20 8 - 22 mg/dL    Creatinine 1.07 0.50 - 1.40 mg/dL     Calcium 8.8 8.5 - 10.5 mg/dL    AST(SGOT) 254 (H) 12 - 45 U/L    ALT(SGPT) 246 (H) 2 - 50 U/L    Alkaline Phosphatase 322 (H) 30 - 99 U/L    Total Bilirubin 5.8 (H) 0.1 - 1.5 mg/dL    Albumin 3.4 3.2 - 4.9 g/dL    Total Protein 6.4 6.0 - 8.2 g/dL    Globulin 3.0 1.9 - 3.5 g/dL    A-G Ratio 1.1 g/dL   ESTIMATED GFR    Collection Time: 07/04/21 11:02 AM   Result Value Ref Range    GFR If African American >60 >60 mL/min/1.73 m 2    GFR If Non African American >60 >60 mL/min/1.73 m 2   Lactic Acid Every four hours after STAT order    Collection Time: 07/04/21  3:14 PM   Result Value Ref Range    Lactic Acid 1.4 0.5 - 2.0 mmol/L   CBC without Differential    Collection Time: 07/05/21  4:32 AM   Result Value Ref Range    WBC 9.4 4.8 - 10.8 K/uL    RBC 4.26 (L) 4.70 - 6.10 M/uL    Hemoglobin 13.0 (L) 14.0 - 18.0 g/dL    Hematocrit 38.1 (L) 42.0 - 52.0 %    MCV 89.4 81.4 - 97.8 fL    MCH 30.5 27.0 - 33.0 pg    MCHC 34.1 33.7 - 35.3 g/dL    RDW 44.7 35.9 - 50.0 fL    Platelet Count 141 (L) 164 - 446 K/uL    MPV 10.6 9.0 - 12.9 fL   Comp Metabolic Panel (CMP)    Collection Time: 07/05/21  4:32 AM   Result Value Ref Range    Sodium 136 135 - 145 mmol/L    Potassium 3.7 3.6 - 5.5 mmol/L    Chloride 102 96 - 112 mmol/L    Co2 22 20 - 33 mmol/L    Anion Gap 12.0 7.0 - 16.0    Glucose 88 65 - 99 mg/dL    Bun 18 8 - 22 mg/dL    Creatinine 1.03 0.50 - 1.40 mg/dL    Calcium 8.5 8.5 - 10.5 mg/dL    AST(SGOT) 109 (H) 12 - 45 U/L    ALT(SGPT) 159 (H) 2 - 50 U/L    Alkaline Phosphatase 264 (H) 30 - 99 U/L    Total Bilirubin 4.1 (H) 0.1 - 1.5 mg/dL    Albumin 3.3 3.2 - 4.9 g/dL    Total Protein 5.9 (L) 6.0 - 8.2 g/dL    Globulin 2.6 1.9 - 3.5 g/dL    A-G Ratio 1.3 g/dL   Lipid Profile (Lipid Panel) Fasting    Collection Time: 07/05/21  4:32 AM   Result Value Ref Range    Cholesterol,Tot 108 100 - 199 mg/dL    Triglycerides 107 0 - 149 mg/dL    HDL 21 (A) >=40 mg/dL    LDL 66 <100 mg/dL   ESTIMATED GFR    Collection Time: 07/05/21  4:32 AM    Result Value Ref Range    GFR If African American >60 >60 mL/min/1.73 m 2    GFR If Non African American >60 >60 mL/min/1.73 m 2         Radiology Review:  [id number]/[id number]   Final Result      1.  Distal common bile duct stone.   2.  No significant biliary dilation.      OUTSIDE IMAGES-CT ABDOMEN /PELVIS   Final Result      US-RUQ   Final Result      1.  Limited exam.      2.  No abnormalities identified      DX-CHEST-PORTABLE (1 VIEW)   Final Result      No acute cardiopulmonary disease.            MDM (Data Review):   -Records reviewed and summarized in current documentation  -I personally reviewed and interpreted the laboratory results  -I personally reviewed the radiology images      Medical Decision Making, by Problem:  Active Hospital Problems    Diagnosis    • RUQ pain [R10.11]    • Sepsis (HCC) [A41.9]    • COPD (chronic obstructive pulmonary disease) (HCC) [J44.9]    • HTN (hypertension) [I10]            Assessment/Recommendations:  Assessment:  1.  Right upper quadrant abdominal pain  2.  Nausea and vomiting  3.  Choledocholithiasis without evidence of cholecystitis or cholelithiasis  4.  Elevated liver enzymes bilirubin, secondary to #3  5.  COPD  6.  Hypertension    Plan:  1.  Endoscopic retrograde cholangiopancreatography with Dr. Ryan peres today, time to be determined, but hopefully at around 11 AM.    Risks, benefits, and alternatives of aforementioned procedures were discussed with patient and/or family member(s).  Consenting person(s) were given opportunities to ask questions and discuss other options.  Risks including but not limited to perforation, infection, bleeding, missed lesion(s), possible need for surgery(ies) and/or interventional radiology, possible need for repeat procedure(s) and/or additional testing, hospitalization possibly prolonged, cardiac and/or pulmonary event, aspiration, hypoxia, stroke, medication and/or anesthesia reaction, indefinite diagnosis, discomfort, unsuccessful  and/or incomplete procedure, ineffective therapy and/or persistent symptoms, damage to adjacent organs and/or vascular structures, and other adverse events possibly life-threatening.  Interactive discussion was undertaken with Layman's terms. I answered questions in full and to satisfaction.  Consenting person(s) stated understanding and acceptance of these risks, and wished to proceed.  Informed consent was given in clear state of mind.    2.  N.p.o.    3.  Check a.m. hepatic panel, lipase tomorrow    4.  Recommend surgical consultation for possible gallbladder removal.  Patient is requesting that his gallbladder is removed as he has experienced previous symptoms and episodes similar to this that resolved on their own.      Thank you very much for allowing me to participate in the care of your patient.  Please feel free to contact me anytime at [phone].     PERICO Delcid.    Core Quality Measures   Reviewed items::  Labs, Medications and Radiology reports reviewed

## 2021-07-05 NOTE — OR NURSING
The pt is awake and oriented. Respirations are regular and easy. The pt is comfortable.   Pt transported to floor with oxygen The oxygen tank is greater than 50% full upon inspection.

## 2021-07-05 NOTE — CARE PLAN
The patient is Stable - Low risk of patient condition declining or worsening    Shift Goals  Clinical Goals: Pain Management   Patient Goals: Rest    Progress made toward(s) clinical / shift goals:    Problem: Knowledge Deficit - Standard  Goal: Patient and family/care givers will demonstrate understanding of plan of care, disease process/condition, diagnostic tests and medications  Outcome: Progressing  Note: All questions and concerns addressed with patient, patient verbalizes understanding of current plan of care      Problem: Pain - Standard  Goal: Alleviation of pain or a reduction in pain to the patient’s comfort goal  Outcome: Progressing  Note: Patient's pain managed with prescribed medications and nonpharmalogical pain interventions. Education provided to patient about pain rating scale, prescribed medications and nonpharmalogical pain interventions. Patient verbalizes understanding of education. Patient able to rest and sleep comfortably throughout the night with effective pain management

## 2021-07-05 NOTE — ANESTHESIA PREPROCEDURE EVALUATION
Relevant Problems   PULMONARY   (positive) COPD (chronic obstructive pulmonary disease) (HCC)      CARDIAC   (positive) HTN (hypertension)       Physical Exam    Airway   Mallampati: II  TM distance: >3 FB  Neck ROM: full       Cardiovascular - normal exam  Rhythm: regular  Rate: normal  (-) murmur     Dental - normal exam           Pulmonary - normal exam  Breath sounds clear to auscultation     Abdominal    Neurological - normal exam                 Anesthesia Plan    ASA 3- EMERGENT       Plan - general       Airway plan will be ETT          Induction: intravenous    Postoperative Plan: Postoperative administration of opioids is intended.    Pertinent diagnostic labs and testing reviewed    Informed Consent:    Anesthetic plan and risks discussed with patient.    Use of blood products discussed with: patient whom consented to blood products.

## 2021-07-05 NOTE — PROGRESS NOTES
Pt A&Ox4  Patient answers all questions appropriately and follows all instructions      Patient rates pain as 8/10 upon assessment, patient medicated per MAR     NPO diet, Denies n/v +bowel sounds, +flatus, LBM 7/4     Saturating >90% on room air   Patient denies SOB      Pt ambulates independently with a steady gait   Patient verbalizes understanding to call when needing assistance      Updated on plan of care. Safety education provided. Bed locked in low. Call light within reach. Rounding in place.

## 2021-07-05 NOTE — PROCEDURES
Endoscopic Retrograde Cholangiopancreatography    Date of Procedure:  7/5/2021  Attending Physician:  Ryan Polo MD  Indications: Choledocholithiasis    Instrument: Olympus Flexible Sideviewing Endoscope  Sedation:   Anesthesiologist/Type of Anesthesia:  Anesthesiologist: Randall Hawkins M.D./General    Surgical Staff:  Circulator: Mague Waller  Endoscopy Technician: Mague Waller; Suzanna Baird  Radiology Technologist: Joshua Gillespie  Endoscopy Nurse: Najma Pina R.N.        Pre-Anesthesia Assessment:  Prior to the procedure, a History and Physical was performed, and patient medications and allergies were reviewed. The patient’s tolerance of previous anesthesia was also reviewed. The risks and benefits of the procedure and the sedation options and risks were discussed with the patient including but not limited to infection, bleeding, aspiration, perforation, adverse medication reaction, missed diagnosis, missed lesions, and pancreatitis. The patient verbalized understanding. All questions were answered, and informed consent was obtained      After I obtained informed consent from the patient, the patient was placed in the prone/swimmer position. Appropriate time-out protocol was followed: the correct patient, the correct procedure, and the correct equipment in the room were confirmed. Throughout the procedure, the patient’s blood pressure, pulse, and oxygen saturations were monitored continuously. The Olymus flexible sideviewing duodenoscope was inserted through the oropharynx, esophagus intubated, then advanced to the gastrointestinal tract to the major papilla. The duct(s) were cannulated and contrast was injected I personally interpreted the ductal images.  Findings and interventions were performed and documented below. Air was then withdrawn and the duodenoscope was removed. The patient tolerated the procedure well. There were no immediate postoperative complications    Findings:      film was normal.  Scope inserted to second portion of duodenum without difficulty.  Bile was noted extruding from the ampullary orifice papilla.  Cannulation with a 0.025 wire sphincterotome was pure.  Throughout the entire procedure the pancreatic duct was never cannulated or injected.  Wire was inserted into the expected course of the intrahepatic duct.  A test cholangiogram demonstrated appropriate anatomy with a filling defect in the distal common bile duct.  A traction biliary sphincterotomy was performed the edge of the transverse fold.  There was no excessive bleeding.  Utilizing a 9-12 mm extractor balloon multiple balloon sweeps were performed from proximal to distal with extraction of a bile duct stone.  Multiple additional sweeps were without any additional findings sludge or stones.  Stepwise occlusion cholangiogram demonstrated no additional filling defect.  Cystic duct appears patent.  Contrasted entering the gallbladder.  Impacted cholangiogram at the ampullary orifice demonstrated no extravasation contrast to suggest perforation.  Bilateral films under diaphragm was negative for free air.  Pancreatic duct was never cannulated or injected.    Impressions:   1.  Choledocholithiasis treated with ERCP sphincterotomy balloon sweep with removal of the bile duct stone.      Recommendations:   1.  Monitor for postprocedure complication including perforation bleeding pancreatitis  2.  Ideally avoid anticoagulation for 3 days due to sphincterotomy  3.  Indomethacin 100 mg suppository as well as 1 additional lactated Ringer to prevent post ERCP pancreatitis  4.  Trend LFTs  5.  Hospitalist to consult surgery for timing of cholecystectomy  6.  Ice chips for 4 hours post procedure if no significant pain advance to clears    NOTE: Radiologic interpretation of dynamic and static fluoroscopic imaging by myself.  At no time was/were a Radiologist present.     This note was generated using voice recognition software  which has a small chance of producing errors of grammar and possibly content. I have made every reasonable attempt to find and correct any obvious errors, but expect that some may not be found prior to finalization of this note

## 2021-07-05 NOTE — PROGRESS NOTES
"Hospital Medicine Daily Progress Note    Date of Service  7/5/2021    Chief Complaint  Pérez Willams is a 76 y.o. male admitted 7/4/2021 with abdominal pain    Hospital Course  76M tsfer from [facility] by Dr. Delgado at [id number] c/o RUQ pain x 6 hours with assoc N/V. 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 Otherwise, patient denies sob, cough, chest pain/pressure, congestion, extremity swelling, weakness, unintentional weight changes, fever, chills, hemoptysis, diarrhea, constipation, headaches, dysuria/dyspareunia, polyuria, or polydipsia. Denies recent history/use of malignancy, drug, alcohol, or cannabinoid use.    Interval Problem Update  7/4/2021: Update  Appreciate general surgery recommendations.  Patient valuated bedside still having residual amounts of abdominal pain physical exam significant for right upper quadrant tenderness palpation positive Parada sign.  Patient pending MRCP.  Ultrasound of the right upper quadrant essentially negative.  There is suspicion that patient may have had gallstone which is may have passed.  We will continue present current conservative therapy.  7/5/2021:  Patient had MRCP performed over the course the evening which was positive for gallstone in the distal common bile duct.  Consulted with gastroenterology for ERCP.  Patient had ERCP port performed today with extraction of common bile duct stone.  Patient tolerated procedure well.  Continue present antimicrobials.  Prior to procedure patient denies objective fevers rigors chills.  Follow-up with gastroenterology postoperative recommendations.  Anticipated discharge possible on 7/6/2021.    I have personally seen and examined the patient at bedside. I discussed the plan of care with patient and general surgery and GI.    Consultants/Specialty  general surgery and GI    Code Status  Full Code    Disposition  Patient is not medically " cleared.   Anticipate discharge to to home with close outpatient follow-up.  I have placed the appropriate orders for post-discharge needs.    Review of Systems  Review of Systems   Constitutional: Negative for chills, diaphoresis, fever, malaise/fatigue and weight loss.   HENT: Negative for congestion, nosebleeds, sinus pain and sore throat.    Eyes: Negative for double vision, pain, discharge and redness.   Respiratory: Negative for cough, sputum production, shortness of breath, wheezing and stridor.    Cardiovascular: Negative for chest pain, palpitations and leg swelling.   Gastrointestinal: Positive for abdominal pain. Negative for blood in stool, diarrhea, nausea and vomiting.   Genitourinary: Negative for flank pain, frequency and urgency.   Musculoskeletal: Negative for back pain, joint pain, myalgias and neck pain.   Skin: Negative for itching and rash.   Neurological: Negative for dizziness, tingling, seizures, weakness and headaches.   Endo/Heme/Allergies: Negative for polydipsia. Does not bruise/bleed easily.   Psychiatric/Behavioral: Negative for memory loss. The patient is not nervous/anxious and does not have insomnia.         Physical Exam  Temp:  [36.8 °C (98.3 °F)-38.3 °C (100.9 °F)] 37.9 °C (100.2 °F)  Pulse:  [77-96] 95  Resp:  [14-19] 14  BP: (112-162)/(56-86) 112/73  SpO2:  [88 %-97 %] 88 %    Physical Exam  Constitutional:       General: He is not in acute distress.     Appearance: Normal appearance. He is not ill-appearing.   HENT:      Head: Normocephalic and atraumatic.      Right Ear: External ear normal.      Left Ear: External ear normal.      Nose: No congestion or rhinorrhea.      Mouth/Throat:      Mouth: Mucous membranes are moist.   Eyes:      Extraocular Movements: Extraocular movements intact.      Pupils: Pupils are equal, round, and reactive to light.   Cardiovascular:      Rate and Rhythm: Normal rate and regular rhythm.      Pulses: Normal pulses.      Heart sounds: No murmur  heard.     Pulmonary:      Effort: Pulmonary effort is normal. No respiratory distress.      Breath sounds: No wheezing or rales.   Abdominal:      General: There is no distension.      Tenderness: There is abdominal tenderness. There is guarding.      Comments: Positive Parada sign on palpation of abdomen.   Musculoskeletal:         General: No swelling, tenderness or deformity. Normal range of motion.      Cervical back: Normal range of motion. No rigidity or tenderness.   Skin:     General: Skin is warm.      Coloration: Skin is not jaundiced or pale.      Findings: No bruising.   Neurological:      General: No focal deficit present.      Mental Status: He is alert and oriented to person, place, and time. Mental status is at baseline.      Cranial Nerves: No cranial nerve deficit.   Psychiatric:         Mood and Affect: Mood normal.         Thought Content: Thought content normal.         Fluids    Intake/Output Summary (Last 24 hours) at 7/5/2021 1527  Last data filed at 7/5/2021 1508  Gross per 24 hour   Intake 2655 ml   Output 1345 ml   Net 1310 ml       Laboratory  Recent Labs     07/04/21  0334 07/05/21  0432   WBC 11.6* 9.4   RBC 4.28* 4.26*   HEMOGLOBIN 13.3* 13.0*   HEMATOCRIT 38.0* 38.1*   MCV 88.8 89.4   MCH 31.1 30.5   MCHC 35.0 34.1   RDW 44.6 44.7   PLATELETCT 143* 141*   MPV 10.2 10.6     Recent Labs     07/04/21  1102 07/05/21  0432   SODIUM 141 136   POTASSIUM 4.0 3.7   CHLORIDE 106 102   CO2 24 22   GLUCOSE 120* 88   BUN 20 18   CREATININE 1.07 1.03   CALCIUM 8.8 8.5     Recent Labs     07/04/21  0334   INR 1.20*         Recent Labs     07/05/21  0432   TRIGLYCERIDE 107   HDL 21*   LDL 66       Imaging  [id number]   Final Result      1.  Distal common bile duct stone.   2.  No significant biliary dilation.      OUTSIDE IMAGES-CT ABDOMEN /PELVIS   Final Result      US-RUQ   Final Result      1.  Limited exam.      2.  No abnormalities identified      DX-CHEST-PORTABLE (1 VIEW)   Final Result       No acute cardiopulmonary disease.      DX-PORTABLE FLUORO > 1 HOUR    (Results Pending)        Assessment/Plan  Choledocholithiasis with acute cholecystitis with obstruction  Assessment & Plan  Patient had MRCP for harmed which showed gallstone and distal common bile duct likely causing transient paroxysmal occlusion and obstruction.  Patient had a ERCP performed today by Dr. Polo [facility].  Patient had extraction of common bile duct stone.  Patient tolerated procedure well.  Continue to follow labs repeat CMP for liver enzymes in the morning    RUQ pain- (present on admission)  Assessment & Plan  Notable findings on transfer paperwork include WBC 9.18, Plt 163, Glucose 209 BUN 36, Cr 1.6, , Lipase 16  CT negative  MRCP  RUQ US  Rocephin/Flagyl  7/4/2021:  Right upper quadrant ultrasound essentially negative.  Patient pending MRCP.  General surgery recommendations appreciated.  Continue present medications and antimicrobials as indicated above.  Will reevaluate on 7/5/2021.  No indication for surgery at present.  7/5/2021:  Status post ERCP because of common bile duct partial obstruction from gallstone.  Patient tolerated procedure well continue with postoperative management      HTN (hypertension)  Assessment & Plan  Continue Home Medications incl carvedilol  Labetalol ivp prn with parameters      COPD (chronic obstructive pulmonary disease) (HCC)  Assessment & Plan  Chronic, mild, stable  COPD educator recommendation appreciated.    Sepsis (HCC)- (present on admission)  Assessment & Plan  This is Sepsis Present on admission  SIRS criteria identified on my evaluation include: Tachycardia, with heart rate greater than 90 BPM and Tachypnea, with respirations greater than 20 per minute  Source is unknown  Sepsis protocol initiated  Fluid resuscitation ordered per protocol  IV antibiotics as appropriate for source of sepsis  While organ dysfunction may be noted elsewhere in this problem list or in the  chart, degree of organ dysfunction does not meet CMS criteria for severe sepsis  7/4/2021:  -Resolving             VTE prophylaxis: SCDs/TEDs and heparin ppx    I have performed a physical exam and reviewed and updated ROS and Plan today (7/5/2021). In review of yesterday's note (7/4/2021), there are no changes except as documented above.

## 2021-07-05 NOTE — DISCHARGE PLANNING
Anticipated Discharge Disposition:   TBD    Action:   This RN CM is following the case. Pt is a transfer from [facility] and is here for abdominal pain. Pt is pending ERCP.    Barriers to Discharge:   Pending medical clearance    Plan:   CM to continue to assist Pt with discharge as needed

## 2021-07-05 NOTE — ANESTHESIA POSTPROCEDURE EVALUATION
Patient: Pérez Willams    Procedure Summary     Date: 07/05/21 Room / Location: [facility] OR 08 / SURGERY [facility]    Anesthesia Start: 1434 Anesthesia Stop: 1508    Procedures:       ERCP (ENDOSCOPIC RETROGRADE CHOLANGIOPANCREATOGRAPHY) (N/A Esophagus)      ERCP,WITH CALCULUS REMOVAL FROM BILE OR PANCREATIC DUCT (N/A Abdomen) Diagnosis: (CBD Stone, choledocholithiasis)    Surgeons: Ryan Polo M.D. Responsible Provider: Randall Hawkins M.D.    Anesthesia Type: general ASA Status: 3 - Emergent          Final Anesthesia Type: general  Last vitals  BP   Blood Pressure : 131/72    Temp   (!) 38.3 °C (100.9 °F)    Pulse   93   Resp   14    SpO2   95 %      Anesthesia Post Evaluation    Patient location during evaluation: PACU  Patient participation: complete - patient participated  Level of consciousness: awake and alert  Pain score: 2    Airway patency: patent  Anesthetic complications: no  Cardiovascular status: hemodynamically stable  Respiratory status: acceptable  Hydration status: euvolemic    PONV: none          No complications documented.     Nurse Pain Score: 3 (NPRS)

## 2021-07-05 NOTE — ANESTHESIA TIME REPORT
Anesthesia Start and Stop Event Times     Date Time Event    7/5/2021 1418 Ready for Procedure     1434 Anesthesia Start     1508 Anesthesia Stop        Responsible Staff  07/05/21    Name Role Begin End    Randall Hawkins M.D. Anesth 1434 1508        Preop Diagnosis (Free Text):  Pre-op Diagnosis     Abdominal pain, choledocholithiasis        Preop Diagnosis (Codes):    Post op Diagnosis  Multiple obstructing stones in biliary tract      Premium Reason  F. Holiday    Comments: emergency premium

## 2021-07-05 NOTE — ANESTHESIA PROCEDURE NOTES
Airway    Date/Time: 7/5/2021 2:42 PM  Performed by: Randall Hawkins M.D.  Authorized by: Randall Hawkins M.D.     Location:  OR  Urgency:  Elective  Indications for Airway Management:  Anesthesia      Spontaneous Ventilation: absent    Sedation Level:  Deep  Preoxygenated: Yes    Patient Position:  Sniffing  Final Airway Type:  Endotracheal airway  Final Endotracheal Airway:  ETT  Cuffed: Yes    Technique Used for Successful ETT Placement:  Direct laryngoscopy    Insertion Site:  Oral  Blade Type:  Rahel  Laryngoscope Blade/Videolaryngoscope Blade Size:  3  ETT Size (mm):  7.5  Measured from:  Teeth  ETT to Teeth (cm):  21  Placement Verified by: auscultation and capnometry    Cormack-Lehane Classification:  Grade I - full view of glottis  Number of Attempts at Approach:  1

## 2021-07-05 NOTE — ASSESSMENT & PLAN NOTE
Patient had MRCP for harmed which showed gallstone and distal common bile duct likely causing transient paroxysmal occlusion and obstruction.  Patient had a ERCP performed today by Dr. [facility].  Patient had extraction of common bile duct stone.  Patient tolerated procedure well.  Continue to follow labs repeat CMP for liver enzymes in the morning

## 2021-07-06 ENCOUNTER — ANESTHESIA (OUTPATIENT)
Dept: SURGERY | Facility: MEDICAL CENTER | Age: 76
DRG: 854 | End: 2021-07-06
Payer: COMMERCIAL

## 2021-07-06 ENCOUNTER — ANESTHESIA EVENT (OUTPATIENT)
Dept: SURGERY | Facility: MEDICAL CENTER | Age: 76
DRG: 854 | End: 2021-07-06
Payer: COMMERCIAL

## 2021-07-06 PROBLEM — K80.50 CHOLEDOCHOLITHIASIS: Status: ACTIVE | Noted: 2021-07-05

## 2021-07-06 PROBLEM — R78.81 GRAM-NEGATIVE BACTEREMIA: Status: ACTIVE | Noted: 2021-07-06

## 2021-07-06 PROBLEM — K80.30 CALCULUS OF BILE DUCT WITH CHOLANGITIS: Status: ACTIVE | Noted: 2021-07-06

## 2021-07-06 LAB
ALBUMIN SERPL BCP-MCNC: 3.1 G/DL (ref 3.2–4.9)
ALBUMIN/GLOB SERPL: 1.1 G/DL
ALP SERPL-CCNC: 214 U/L (ref 30–99)
ALT SERPL-CCNC: 96 U/L (ref 2–50)
ANION GAP SERPL CALC-SCNC: 9 MMOL/L (ref 7–16)
AST SERPL-CCNC: 42 U/L (ref 12–45)
BACTERIA BLD CULT: ABNORMAL
BASOPHILS # BLD AUTO: 0.1 % (ref 0–1.8)
BASOPHILS # BLD: 0.01 K/UL (ref 0–0.12)
BILIRUB SERPL-MCNC: 2.1 MG/DL (ref 0.1–1.5)
BUN SERPL-MCNC: 20 MG/DL (ref 8–22)
CALCIUM SERPL-MCNC: 8.5 MG/DL (ref 8.5–10.5)
CHLORIDE SERPL-SCNC: 105 MMOL/L (ref 96–112)
CO2 SERPL-SCNC: 23 MMOL/L (ref 20–33)
CREAT SERPL-MCNC: 0.99 MG/DL (ref 0.5–1.4)
EOSINOPHIL # BLD AUTO: 0 K/UL (ref 0–0.51)
EOSINOPHIL NFR BLD: 0 % (ref 0–6.9)
ERYTHROCYTE [DISTWIDTH] IN BLOOD BY AUTOMATED COUNT: 42.5 FL (ref 35.9–50)
GLOBULIN SER CALC-MCNC: 2.8 G/DL (ref 1.9–3.5)
GLUCOSE SERPL-MCNC: 251 MG/DL (ref 65–99)
HCT VFR BLD AUTO: 37.7 % (ref 42–52)
HGB BLD-MCNC: 13 G/DL (ref 14–18)
IMM GRANULOCYTES # BLD AUTO: 0.05 K/UL (ref 0–0.11)
IMM GRANULOCYTES NFR BLD AUTO: 0.6 % (ref 0–0.9)
LYMPHOCYTES # BLD AUTO: 0.51 K/UL (ref 1–4.8)
LYMPHOCYTES NFR BLD: 5.9 % (ref 22–41)
MCH RBC QN AUTO: 30.3 PG (ref 27–33)
MCHC RBC AUTO-ENTMCNC: 34.5 G/DL (ref 33.7–35.3)
MCV RBC AUTO: 87.9 FL (ref 81.4–97.8)
MONOCYTES # BLD AUTO: 0.49 K/UL (ref 0–0.85)
MONOCYTES NFR BLD AUTO: 5.6 % (ref 0–13.4)
NEUTROPHILS # BLD AUTO: 7.65 K/UL (ref 1.82–7.42)
NEUTROPHILS NFR BLD: 87.8 % (ref 44–72)
NRBC # BLD AUTO: 0 K/UL
NRBC BLD-RTO: 0 /100 WBC
PATHOLOGY CONSULT NOTE: NORMAL
PLATELET # BLD AUTO: 150 K/UL (ref 164–446)
PMV BLD AUTO: 10.7 FL (ref 9–12.9)
POTASSIUM SERPL-SCNC: 4.1 MMOL/L (ref 3.6–5.5)
PROT SERPL-MCNC: 5.9 G/DL (ref 6–8.2)
RBC # BLD AUTO: 4.29 M/UL (ref 4.7–6.1)
SIGNIFICANT IND 70042: ABNORMAL
SIGNIFICANT IND 70042: ABNORMAL
SITE SITE: ABNORMAL
SITE SITE: ABNORMAL
SODIUM SERPL-SCNC: 137 MMOL/L (ref 135–145)
SOURCE SOURCE: ABNORMAL
SOURCE SOURCE: ABNORMAL
WBC # BLD AUTO: 8.7 K/UL (ref 4.8–10.8)

## 2021-07-06 PROCEDURE — 501399 HCHG SPECIMAN BAG, ENDO CATC: Performed by: SURGERY

## 2021-07-06 PROCEDURE — 700111 HCHG RX REV CODE 636 W/ 250 OVERRIDE (IP): Performed by: STUDENT IN AN ORGANIZED HEALTH CARE EDUCATION/TRAINING PROGRAM

## 2021-07-06 PROCEDURE — A9270 NON-COVERED ITEM OR SERVICE: HCPCS | Performed by: STUDENT IN AN ORGANIZED HEALTH CARE EDUCATION/TRAINING PROGRAM

## 2021-07-06 PROCEDURE — 160002 HCHG RECOVERY MINUTES (STAT): Performed by: SURGERY

## 2021-07-06 PROCEDURE — 160048 HCHG OR STATISTICAL LEVEL 1-5: Performed by: SURGERY

## 2021-07-06 PROCEDURE — 700101 HCHG RX REV CODE 250: Performed by: SURGERY

## 2021-07-06 PROCEDURE — 700102 HCHG RX REV CODE 250 W/ 637 OVERRIDE(OP): Performed by: SURGERY

## 2021-07-06 PROCEDURE — 501568 HCHG TROCAR, BLUNTPORT 12MM: Performed by: SURGERY

## 2021-07-06 PROCEDURE — 501838 HCHG SUTURE GENERAL: Performed by: SURGERY

## 2021-07-06 PROCEDURE — 700111 HCHG RX REV CODE 636 W/ 250 OVERRIDE (IP): Performed by: ANESTHESIOLOGY

## 2021-07-06 PROCEDURE — 700105 HCHG RX REV CODE 258: Performed by: STUDENT IN AN ORGANIZED HEALTH CARE EDUCATION/TRAINING PROGRAM

## 2021-07-06 PROCEDURE — 110454 HCHG SHELL REV 250: Performed by: SURGERY

## 2021-07-06 PROCEDURE — 160029 HCHG SURGERY MINUTES - 1ST 30 MINS LEVEL 4: Performed by: SURGERY

## 2021-07-06 PROCEDURE — 99233 SBSQ HOSP IP/OBS HIGH 50: CPT | Performed by: FAMILY MEDICINE

## 2021-07-06 PROCEDURE — 700102 HCHG RX REV CODE 250 W/ 637 OVERRIDE(OP): Performed by: FAMILY MEDICINE

## 2021-07-06 PROCEDURE — 770006 HCHG ROOM/CARE - MED/SURG/GYN SEMI*

## 2021-07-06 PROCEDURE — 700102 HCHG RX REV CODE 250 W/ 637 OVERRIDE(OP): Performed by: STUDENT IN AN ORGANIZED HEALTH CARE EDUCATION/TRAINING PROGRAM

## 2021-07-06 PROCEDURE — 501583 HCHG TROCAR, THRD CAN&SEAL 5X100: Performed by: SURGERY

## 2021-07-06 PROCEDURE — 85025 COMPLETE CBC W/AUTO DIFF WBC: CPT

## 2021-07-06 PROCEDURE — 502648 HCHG APPLICATOR, EVICEL: Performed by: SURGERY

## 2021-07-06 PROCEDURE — 700111 HCHG RX REV CODE 636 W/ 250 OVERRIDE (IP): Performed by: SURGERY

## 2021-07-06 PROCEDURE — 700101 HCHG RX REV CODE 250: Performed by: ANESTHESIOLOGY

## 2021-07-06 PROCEDURE — 700105 HCHG RX REV CODE 258: Performed by: ANESTHESIOLOGY

## 2021-07-06 PROCEDURE — 501577 HCHG TROCAR, STEP 11MM: Performed by: SURGERY

## 2021-07-06 PROCEDURE — 700102 HCHG RX REV CODE 250 W/ 637 OVERRIDE(OP): Performed by: ANESTHESIOLOGY

## 2021-07-06 PROCEDURE — 500371 HCHG DRAIN, BLAKE 10MM: Performed by: SURGERY

## 2021-07-06 PROCEDURE — 0FT44ZZ RESECTION OF GALLBLADDER, PERCUTANEOUS ENDOSCOPIC APPROACH: ICD-10-PCS | Performed by: SURGERY

## 2021-07-06 PROCEDURE — A9270 NON-COVERED ITEM OR SERVICE: HCPCS | Performed by: FAMILY MEDICINE

## 2021-07-06 PROCEDURE — 160041 HCHG SURGERY MINUTES - EA ADDL 1 MIN LEVEL 4: Performed by: SURGERY

## 2021-07-06 PROCEDURE — 88304 TISSUE EXAM BY PATHOLOGIST: CPT

## 2021-07-06 PROCEDURE — 501570 HCHG TROCAR, SEPARATOR: Performed by: SURGERY

## 2021-07-06 PROCEDURE — A9270 NON-COVERED ITEM OR SERVICE: HCPCS | Performed by: SURGERY

## 2021-07-06 PROCEDURE — A9270 NON-COVERED ITEM OR SERVICE: HCPCS | Performed by: ANESTHESIOLOGY

## 2021-07-06 PROCEDURE — 80053 COMPREHEN METABOLIC PANEL: CPT

## 2021-07-06 PROCEDURE — 36415 COLL VENOUS BLD VENIPUNCTURE: CPT

## 2021-07-06 PROCEDURE — 502571 HCHG PACK, LAP CHOLE: Performed by: SURGERY

## 2021-07-06 PROCEDURE — 160009 HCHG ANES TIME/MIN: Performed by: SURGERY

## 2021-07-06 PROCEDURE — 160036 HCHG PACU - EA ADDL 30 MINS PHASE I: Performed by: SURGERY

## 2021-07-06 PROCEDURE — 160035 HCHG PACU - 1ST 60 MINS PHASE I: Performed by: SURGERY

## 2021-07-06 RX ORDER — HYDROMORPHONE HYDROCHLORIDE 1 MG/ML
0.1 INJECTION, SOLUTION INTRAMUSCULAR; INTRAVENOUS; SUBCUTANEOUS
Status: DISCONTINUED | OUTPATIENT
Start: 2021-07-06 | End: 2021-07-06 | Stop reason: HOSPADM

## 2021-07-06 RX ORDER — HYDROMORPHONE HYDROCHLORIDE 1 MG/ML
0.2 INJECTION, SOLUTION INTRAMUSCULAR; INTRAVENOUS; SUBCUTANEOUS
Status: DISCONTINUED | OUTPATIENT
Start: 2021-07-06 | End: 2021-07-06 | Stop reason: HOSPADM

## 2021-07-06 RX ORDER — METOCLOPRAMIDE HYDROCHLORIDE 5 MG/ML
INJECTION INTRAMUSCULAR; INTRAVENOUS PRN
Status: DISCONTINUED | OUTPATIENT
Start: 2021-07-06 | End: 2021-07-06 | Stop reason: SURG

## 2021-07-06 RX ORDER — BUPIVACAINE HYDROCHLORIDE AND EPINEPHRINE 5; 5 MG/ML; UG/ML
INJECTION, SOLUTION EPIDURAL; INTRACAUDAL; PERINEURAL
Status: DISCONTINUED | OUTPATIENT
Start: 2021-07-06 | End: 2021-07-06 | Stop reason: HOSPADM

## 2021-07-06 RX ORDER — SODIUM CHLORIDE, SODIUM GLUCONATE, SODIUM ACETATE, POTASSIUM CHLORIDE AND MAGNESIUM CHLORIDE 526; 502; 368; 37; 30 MG/100ML; MG/100ML; MG/100ML; MG/100ML; MG/100ML
INJECTION, SOLUTION INTRAVENOUS
Status: DISCONTINUED | OUTPATIENT
Start: 2021-07-06 | End: 2021-07-06 | Stop reason: SURG

## 2021-07-06 RX ORDER — LABETALOL HYDROCHLORIDE 5 MG/ML
5 INJECTION, SOLUTION INTRAVENOUS
Status: DISCONTINUED | OUTPATIENT
Start: 2021-07-06 | End: 2021-07-06 | Stop reason: HOSPADM

## 2021-07-06 RX ORDER — HYDROMORPHONE HYDROCHLORIDE 1 MG/ML
0.4 INJECTION, SOLUTION INTRAMUSCULAR; INTRAVENOUS; SUBCUTANEOUS
Status: DISCONTINUED | OUTPATIENT
Start: 2021-07-06 | End: 2021-07-06 | Stop reason: HOSPADM

## 2021-07-06 RX ORDER — HYDROMORPHONE HYDROCHLORIDE 1 MG/ML
0.5 INJECTION, SOLUTION INTRAMUSCULAR; INTRAVENOUS; SUBCUTANEOUS
Status: DISCONTINUED | OUTPATIENT
Start: 2021-07-06 | End: 2021-07-09 | Stop reason: HOSPADM

## 2021-07-06 RX ORDER — BUDESONIDE AND FORMOTEROL FUMARATE DIHYDRATE 160; 4.5 UG/1; UG/1
2 AEROSOL RESPIRATORY (INHALATION) 2 TIMES DAILY
Status: DISCONTINUED | OUTPATIENT
Start: 2021-07-06 | End: 2021-07-09 | Stop reason: HOSPADM

## 2021-07-06 RX ORDER — LIDOCAINE HYDROCHLORIDE 20 MG/ML
INJECTION, SOLUTION EPIDURAL; INFILTRATION; INTRACAUDAL; PERINEURAL PRN
Status: DISCONTINUED | OUTPATIENT
Start: 2021-07-06 | End: 2021-07-06 | Stop reason: SURG

## 2021-07-06 RX ORDER — SODIUM CHLORIDE, SODIUM LACTATE, POTASSIUM CHLORIDE, CALCIUM CHLORIDE 600; 310; 30; 20 MG/100ML; MG/100ML; MG/100ML; MG/100ML
INJECTION, SOLUTION INTRAVENOUS CONTINUOUS
Status: DISCONTINUED | OUTPATIENT
Start: 2021-07-06 | End: 2021-07-06 | Stop reason: HOSPADM

## 2021-07-06 RX ORDER — DEXAMETHASONE SODIUM PHOSPHATE 4 MG/ML
INJECTION, SOLUTION INTRA-ARTICULAR; INTRALESIONAL; INTRAMUSCULAR; INTRAVENOUS; SOFT TISSUE PRN
Status: DISCONTINUED | OUTPATIENT
Start: 2021-07-06 | End: 2021-07-06 | Stop reason: SURG

## 2021-07-06 RX ORDER — OXYCODONE HYDROCHLORIDE 5 MG/1
5 TABLET ORAL
Status: DISCONTINUED | OUTPATIENT
Start: 2021-07-06 | End: 2021-07-09 | Stop reason: HOSPADM

## 2021-07-06 RX ORDER — HYDRALAZINE HYDROCHLORIDE 20 MG/ML
5 INJECTION INTRAMUSCULAR; INTRAVENOUS
Status: DISCONTINUED | OUTPATIENT
Start: 2021-07-06 | End: 2021-07-06 | Stop reason: HOSPADM

## 2021-07-06 RX ORDER — OXYCODONE HYDROCHLORIDE 10 MG/1
10 TABLET ORAL
Status: DISCONTINUED | OUTPATIENT
Start: 2021-07-06 | End: 2021-07-09 | Stop reason: HOSPADM

## 2021-07-06 RX ORDER — ONDANSETRON 2 MG/ML
INJECTION INTRAMUSCULAR; INTRAVENOUS PRN
Status: DISCONTINUED | OUTPATIENT
Start: 2021-07-06 | End: 2021-07-06 | Stop reason: SURG

## 2021-07-06 RX ORDER — DIPHENHYDRAMINE HYDROCHLORIDE 50 MG/ML
12.5 INJECTION INTRAMUSCULAR; INTRAVENOUS
Status: DISCONTINUED | OUTPATIENT
Start: 2021-07-06 | End: 2021-07-06 | Stop reason: HOSPADM

## 2021-07-06 RX ORDER — HYDROMORPHONE HYDROCHLORIDE 1 MG/ML
0.5 INJECTION, SOLUTION INTRAMUSCULAR; INTRAVENOUS; SUBCUTANEOUS ONCE
Status: COMPLETED | OUTPATIENT
Start: 2021-07-06 | End: 2021-07-06

## 2021-07-06 RX ORDER — ONDANSETRON 2 MG/ML
4 INJECTION INTRAMUSCULAR; INTRAVENOUS
Status: DISCONTINUED | OUTPATIENT
Start: 2021-07-06 | End: 2021-07-06 | Stop reason: HOSPADM

## 2021-07-06 RX ADMIN — FENTANYL CITRATE 50 MCG: 50 INJECTION, SOLUTION INTRAMUSCULAR; INTRAVENOUS at 13:17

## 2021-07-06 RX ADMIN — FENTANYL CITRATE 25 MCG: 50 INJECTION, SOLUTION INTRAMUSCULAR; INTRAVENOUS at 12:14

## 2021-07-06 RX ADMIN — ROCURONIUM BROMIDE 50 MG: 10 INJECTION, SOLUTION INTRAVENOUS at 11:51

## 2021-07-06 RX ADMIN — LIDOCAINE HYDROCHLORIDE 100 MG: 20 INJECTION, SOLUTION EPIDURAL; INFILTRATION; INTRACAUDAL at 11:46

## 2021-07-06 RX ADMIN — LISINOPRIL 2.5 MG: 5 TABLET ORAL at 04:33

## 2021-07-06 RX ADMIN — HYDROMORPHONE HYDROCHLORIDE 0.5 MG: 1 INJECTION, SOLUTION INTRAMUSCULAR; INTRAVENOUS; SUBCUTANEOUS at 15:33

## 2021-07-06 RX ADMIN — ONDANSETRON 4 MG: 2 INJECTION INTRAMUSCULAR; INTRAVENOUS at 12:59

## 2021-07-06 RX ADMIN — METRONIDAZOLE 500 MG: 500 TABLET ORAL at 04:33

## 2021-07-06 RX ADMIN — ROCURONIUM BROMIDE 20 MG: 10 INJECTION, SOLUTION INTRAVENOUS at 12:31

## 2021-07-06 RX ADMIN — SUGAMMADEX 200 MG: 100 INJECTION, SOLUTION INTRAVENOUS at 12:59

## 2021-07-06 RX ADMIN — OXYCODONE HYDROCHLORIDE 10 MG: 10 TABLET ORAL at 21:04

## 2021-07-06 RX ADMIN — DEXAMETHASONE SODIUM PHOSPHATE 4 MG: 4 INJECTION, SOLUTION INTRA-ARTICULAR; INTRALESIONAL; INTRAMUSCULAR; INTRAVENOUS; SOFT TISSUE at 11:54

## 2021-07-06 RX ADMIN — FENTANYL CITRATE 50 MCG: 50 INJECTION, SOLUTION INTRAMUSCULAR; INTRAVENOUS at 13:45

## 2021-07-06 RX ADMIN — PROPOFOL 150 MG: 10 INJECTION, EMULSION INTRAVENOUS at 11:50

## 2021-07-06 RX ADMIN — FENTANYL CITRATE 50 MCG: 50 INJECTION, SOLUTION INTRAMUSCULAR; INTRAVENOUS at 11:47

## 2021-07-06 RX ADMIN — OMEPRAZOLE 40 MG: 20 CAPSULE, DELAYED RELEASE ORAL at 04:33

## 2021-07-06 RX ADMIN — METOCLOPRAMIDE 10 MG: 5 INJECTION, SOLUTION INTRAMUSCULAR; INTRAVENOUS at 11:43

## 2021-07-06 RX ADMIN — CARVEDILOL 6.25 MG: 6.25 TABLET, FILM COATED ORAL at 07:54

## 2021-07-06 RX ADMIN — CARVEDILOL 6.25 MG: 6.25 TABLET, FILM COATED ORAL at 17:15

## 2021-07-06 RX ADMIN — SODIUM CHLORIDE, SODIUM GLUCONATE, SODIUM ACETATE, POTASSIUM CHLORIDE AND MAGNESIUM CHLORIDE: 526; 502; 368; 37; 30 INJECTION, SOLUTION INTRAVENOUS at 13:04

## 2021-07-06 RX ADMIN — OMEPRAZOLE 40 MG: 20 CAPSULE, DELAYED RELEASE ORAL at 17:15

## 2021-07-06 RX ADMIN — FENTANYL CITRATE 25 MCG: 50 INJECTION, SOLUTION INTRAMUSCULAR; INTRAVENOUS at 12:58

## 2021-07-06 RX ADMIN — METRONIDAZOLE 500 MG: 500 TABLET ORAL at 21:04

## 2021-07-06 RX ADMIN — AMLODIPINE BESYLATE 10 MG: 10 TABLET ORAL at 04:33

## 2021-07-06 RX ADMIN — HYDROCODONE BITARTRATE AND ACETAMINOPHEN 15 MG: 7.5; 325 SOLUTION ORAL at 13:42

## 2021-07-06 RX ADMIN — OXYCODONE 5 MG: 5 TABLET ORAL at 09:06

## 2021-07-06 RX ADMIN — ALBUTEROL SULFATE 2.5 MG: 2.5 SOLUTION RESPIRATORY (INHALATION) at 13:22

## 2021-07-06 RX ADMIN — CEFTRIAXONE SODIUM 2 G: 10 INJECTION, POWDER, FOR SOLUTION INTRAVENOUS at 04:34

## 2021-07-06 RX ADMIN — METRONIDAZOLE 500 MG: 500 TABLET ORAL at 16:01

## 2021-07-06 RX ADMIN — SODIUM CHLORIDE: 9 INJECTION, SOLUTION INTRAVENOUS at 07:57

## 2021-07-06 RX ADMIN — OXYCODONE HYDROCHLORIDE 10 MG: 10 TABLET ORAL at 17:15

## 2021-07-06 ASSESSMENT — ENCOUNTER SYMPTOMS
SENSORY CHANGE: 0
HEADACHES: 0
NERVOUS/ANXIOUS: 0
CONSTITUTIONAL NEGATIVE: 1
NECK PAIN: 0
GASTROINTESTINAL NEGATIVE: 1
VOMITING: 0
MUSCULOSKELETAL NEGATIVE: 1
CARDIOVASCULAR NEGATIVE: 1
ABDOMINAL PAIN: 1
PALPITATIONS: 0
SHORTNESS OF BREATH: 0
EYES NEGATIVE: 1
DIAPHORESIS: 0
SPEECH CHANGE: 0
FEVER: 0
RESPIRATORY NEGATIVE: 1
DIARRHEA: 0
COUGH: 0
FOCAL WEAKNESS: 0
BLURRED VISION: 0
BACK PAIN: 0
CHILLS: 0
NEUROLOGICAL NEGATIVE: 1
HEARTBURN: 0
MYALGIAS: 0
WEAKNESS: 1
NAUSEA: 1
PSYCHIATRIC NEGATIVE: 1
WHEEZING: 0
DIZZINESS: 0
SORE THROAT: 0
FLANK PAIN: 0

## 2021-07-06 ASSESSMENT — PAIN DESCRIPTION - PAIN TYPE
TYPE: ACUTE PAIN
TYPE: SURGICAL PAIN
TYPE: ACUTE PAIN
TYPE: SURGICAL PAIN
TYPE: SURGICAL PAIN

## 2021-07-06 ASSESSMENT — PAIN SCALES - GENERAL: PAIN_LEVEL: 3

## 2021-07-06 NOTE — OP REPORT
Operative report    PreOp Diagnosis: Cholelithiasis with choledocholithiasis      PostOp Diagnosis: Same with acute cholecystitis      Procedure(s):  CHOLECYSTECTOMY, LAPAROSCOPIC - Wound Class: Clean Contaminated    Surgeon(s):  Jacky Huang D.O.    Anesthesiologist/Type of Anesthesia:  Anesthesiologist: Samson Lock M.D./General    Surgical Staff:  Circulator: HOOD Godfrey Scrub: Shelia Gilliland  Scrub Person: Carmel Hays; Lucille Venegas    Specimens removed if any:  ID Type Source Tests Collected by Time Destination   A : Gallbladder Other Other PATHOLOGY SPECIMEN Jacky Huang D.O. 7/6/2021 12:10 PM        Estimated Blood Loss: 300 cc    Findings: Densely scarred gallbladder with adherent bowel, significant pericholecystic edema with wall thickening and stones    Complications: None    Indications: 76-year-old male with longstanding symptomatic gallstones who presented with choledocholithiasis who underwent ERCP yesterday.    OPERATIVE REPORT: The patient was brought to the operating room and placed in  supine position on the operating table. After adequate general anesthesia,   the abdomen was prepped and draped in standard fashion. An area inferior to the umbilucus was infiltrated with 0.25% bupivacaine. An incision was made through the skin and subcutaneous tissues. We then bluntly dissected down to the anterior fascia, which was elevated into the wound using a Kocher clamp. A stay suture of 0 Vicryl was then placed. The fascia was then incised and we dissected through the abdominal wall in layers until the peritoneum was entered. We then placed the Bee port and insufflated the abdomen. The area in the epigastrium was  chosen for a 5 mm port. The skin and subcutaneous tissue were infiltrated   with 0.25% bupivacaine. A small incision was made and a 5 mm port was   inserted under direct camera observation. Two additional 5 mm ports were   placed in the right lateral  abdominal wall using identical technique. I then   grasped the fundus of the gallbladder and retracted it anteriorly and   superiorly. The infundibulum was retracted anteriorly and laterally. We then  skeletonized the cystic duct, doubly clip distally and singly clipped   proximally and then divided with the endoscopic shear. The cystic artery was then skeletonized and doubly clipped proximally and singly clipped distally prior to dividing with the endoscopic shear. The gallbladder was then dissected free from its fossa.  The lateral portion of the gallbladder had dense scar tissue and during this course of dissection we encountered significant bleeding.  This was controlled with some difficulty using [place] dissector and were able to find a hypertrophic vessel within the scar tissue that was bleeding briskly.  This was doubly clipped.  We then completed removing the gallbladder from its fossa.  When this was completed, we placed in an EndoCatch bag and retrieved it from the umbilical port site. We then irrigated the gallbladder   fossa in the right upper quadrant until the effluent was clear.  Due to a fair amount of information and oozing that had to be cauterized, Surgi-Rich was placed in the gallbladder fossa and a 10 mm flat fluted drain was placed as well.  It was brought out through the lateral port site and secured to the skin using 2-0 nylon stitch.  The ports were then removed. The fascia at the umbilical port site was closed using the stay suture placed at the   beginning of the case. The wounds were then irrigated and the skin was closedusing a 4-0 Monocryl stitch in a subcuticular fashion. The area was then cleaned and dried and Dermabond.  The patient was then awakened from anesthesia and taken to post-anesthesia care unit in stable condition. The sponge, needle, and instrument count was correct at the end of the case and I was present for the entirety of the case.      7/6/2021 1:14 PM Jacky Huang  JOAQUIN.

## 2021-07-06 NOTE — PROGRESS NOTES
Pt A&Ox4  Patient answers all questions appropriately and follows all instructions      Patient rates pain as 7/10 upon assessment, patient medicated per MAR     Tolerating clear liquid diet, Denies n/v +bowel sounds, +flatus, LBM 7/4   +void     Saturating >90% on 1L O2 via NC  Patient denies SOB      Pt ambulates with standby assistance and a steady gait   Patient verbalizes understanding to call when needing assistance      Updated on plan of care. Safety education provided. Bed locked in low. Call light within reach. Rounding in place.

## 2021-07-06 NOTE — PROGRESS NOTES
Gastroenterology Progress Note     Author: Ryan Polo M.D.   Date & Time Created: 7/6/2021 11:47 AM    Chief Complaint:  76-year-old male patient seen in consultation for abdominal pain and findings of choledocholithiasis.  He was transferred from the [facility] yesterday with complaints of right upper quadrant pain described as a sharp pain radiating to his back that lasted about 6 hours associated with an episode of nausea and vomiting.  He reports feeling like he had chills but no fever.  Upon evaluation he was found to have elevated liver enzymes with , , alkaline phosphatase 322, bilirubin 5.8.  This morning LFTs improved and bilirubin is 4.1.  Initial ultrasound of the right upper quadrant was limited, but did not demonstrate any significant abnormalities.  MRCP was performed and demonstrated distal common bile duct 6 mm stone with CBD measurement 6 mm.  Gallbladder appeared normal.    Interval History:  7/5/2021: ERCP with stone removal/sphincterotomy  7/6/2021: No significant issues overnight. No natalia abdominal pain. LFT improving.    Review of Systems:  Review of Systems   Constitutional: Negative.    HENT: Negative.    Eyes: Negative.    Respiratory: Negative.    Cardiovascular: Negative.    Gastrointestinal: Negative.    Genitourinary: Negative.    Musculoskeletal: Negative.    Skin: Negative.    Neurological: Negative.    Endo/Heme/Allergies: Negative.    Psychiatric/Behavioral: Negative.        Physical Exam:  Physical Exam  Constitutional:       Appearance: Normal appearance.   HENT:      Head: Normocephalic and atraumatic.   Eyes:      Pupils: Pupils are equal, round, and reactive to light.   Cardiovascular:      Rate and Rhythm: Normal rate and regular rhythm.      Heart sounds: No murmur heard.   No friction rub.   Pulmonary:      Effort: Pulmonary effort is normal. No respiratory distress.      Breath sounds: Normal breath sounds. No stridor. No wheezing or rhonchi.   Abdominal:       General: Abdomen is flat. Bowel sounds are normal. There is no distension.      Palpations: Abdomen is soft. There is no mass.      Tenderness: There is no abdominal tenderness. There is no guarding or rebound.      Hernia: No hernia is present.   Skin:     General: Skin is warm.   Neurological:      General: No focal deficit present.      Mental Status: He is alert.      Cranial Nerves: No cranial nerve deficit.      Sensory: No sensory deficit.         Labs:          Recent Labs     21   SODIUM 141 136 137   POTASSIUM 4.0 3.7 4.1   CHLORIDE 106 102 105   CO2 24 22 23   BUN 20 18 20   CREATININE 1.07 1.03 0.99   CALCIUM 8.8 8.5 8.5     Recent Labs     2113   ALTSGPT 246* 159* 96*   ASTSGOT 254* 109* 42   ALKPHOSPHAT 322* 264* 214*   TBILIRUBIN 5.8* 4.1* 2.1*   GAMMAGT 671*  --   --    GLUCOSE 120* 88 251*     Recent Labs     21   RBC 4.28* 4.26* 4.29*   HEMOGLOBIN 13.3* 13.0* 13.0*   HEMATOCRIT 38.0* 38.1* 37.7*   PLATELETCT 143* 141* 150*   PROTHROMBTM 14.8*  --   --    INR 1.20*  --   --      Recent Labs     2113   WBC 11.6*  --  9.4 8.7   NEUTSPOLYS 86.60*  --   --  87.80*   LYMPHOCYTES 5.10*  --   --  5.90*   MONOCYTES 7.30  --   --  5.60   EOSINOPHILS 0.30  --   --  0.00   BASOPHILS 0.30  --   --  0.10   ASTSGOT  --  254* 109* 42   ALTSGPT  --  246* 159* 96*   ALKPHOSPHAT  --  322* 264* 214*   TBILIRUBIN  --  5.8* 4.1* 2.1*     Hemodynamics:  Temp (24hrs), Av °C (98.6 °F), Min:36.2 °C (97.2 °F), Max:38.3 °C (100.9 °F)  Temperature: 37.2 °C (98.9 °F)  Pulse  Av.7  Min: 70  Max: 96   Blood Pressure : 125/67     Respiratory:    Respiration: 18, Pulse Oximetry: 93 %        RUL Breath Sounds: Clear, RML Breath Sounds: Clear, RLL Breath Sounds: Clear, GUILLERMINA Breath Sounds: Clear, LLL Breath Sounds: Clear  Fluids:    Intake/Output  Summary (Last 24 hours) at 7/6/2021 1147  Last data filed at 7/6/2021 0738  Gross per 24 hour   Intake 2533.75 ml   Output 1700 ml   Net 833.75 ml        GI/Nutrition:  Orders Placed This Encounter   Procedures   • Diet NPO     Standing Status:   Standing     Number of Occurrences:   1     Order Specific Question:   Restrict to:     Answer:   Sips with Medications [3]     Medical Decision Making, by Problem:  Active Hospital Problems    Diagnosis    • *Calculus of bile duct with cholangitis [K80.30]    • Gram-negative bacteremia [R78.81]    • Sepsis (HCC) [A41.9]    • COPD (chronic obstructive pulmonary disease) (HCC) [J44.9]    • HTN (hypertension) [I10]        Assessment:  1.  Right upper quadrant abdominal pain s/p ERCP with stone removal 7/5/2021  2.  Nausea and vomiting  3.  Choledocholithiasis without evidence of cholecystitis or cholelithiasis  4.  Elevated liver enzymes bilirubin, secondary to #3  5.  COPD  6.  Hypertension  7. Bacteremia- Klebsiella        Plan:  1. Appreciate Hospitalist team and Surgical management for cholecystectomy  2. Trend LFT  3. Continue ABX for bacteremia   4. Will sign off.  5. Can follow[facility] with [facility] for further care and health maintenance.    GI TO SIGN OFF / STAND BY - Thank you very much for allowing me to participate in the care of your patient.  GI to sign off at this time.  If the patient develops changes in clinical course/decompensation or you have any additional questions or concerns, please don't hesitate to reengage [facility].  If necessary, follow up with [facility] will be arranged  The patient will be called to schedule an appointment time.  If the patient does not receive a call from [facility] scheduling or they have additional questions, recommend the patient call the clinic at [phone] or [phone] to schedule an appointment.      Quality-Core Measures

## 2021-07-06 NOTE — CARE PLAN
Problem: Knowledge Deficit - Standard  Goal: Patient and family/care givers will demonstrate understanding of plan of care, disease process/condition, diagnostic tests and medications  Outcome: Progressing     Problem: Hemodynamics  Goal: Patient's hemodynamics, fluid balance and neurologic status will be stable or improve  Outcome: Progressing     Problem: Fluid Volume  Goal: Fluid volume balance will be maintained  Outcome: Progressing   The patient is Stable - Low risk of patient condition declining or worsening    Shift Goals  Clinical Goals: discharge  Patient Goals: discharge    Progress made toward(s) clinical / shift goals:  planning on dc    Patient is not progressing towards the following goals:

## 2021-07-06 NOTE — ASSESSMENT & PLAN NOTE
ERCP sphincterotomy balloon sweep with removal of the bile duct stone. 7/5/2021  Laparoscopic cholecystectomy 7/6/2021  IV Rocephin and Flagyl  Pain control, encourage I-S

## 2021-07-06 NOTE — PROGRESS NOTES
[facility] Medicine Daily Progress Note    Date of Service  7/6/2021    Chief Complaint  Pérez Willams is a 76 y.o. male admitted 7/4/2021 with cholangitis.    Hospital Course  Admitted with sepsis secondary to choledocholithiasis with cholangitis.  He was started on empiric coverage with IV Rocephin and Flagyl.  Gastroenterology was consulted on the case, patient underwent ERCP with sphincterotomy and balloon sweep with removal of the bile duct stone on 7/5/2021.  His blood cultures also show gram-negative bacteremia.    Interval Problem Update  Cholangitis/choledocholithiasis-still with some pain, discussed case with Surgery  Bacteremia -cultures showed Klebsiella  HTN - sbp 110-160    I have personally seen and examined the patient at bedside. I discussed the plan of care with patient, bedside RN, charge RN and .    Consultants/Specialty  general surgery and GI    Code Status  Full Code    Disposition  Patient is not medically cleared.   Anticipate discharge to to home with close outpatient follow-up.  I have placed the appropriate orders for post-discharge needs.    Review of Systems  Review of Systems   Constitutional: Negative for chills, diaphoresis, fever and malaise/fatigue.   HENT: Negative for congestion, hearing loss and sore throat.    Eyes: Negative for blurred vision.   Respiratory: Negative for cough, shortness of breath and wheezing.    Cardiovascular: Negative for chest pain, palpitations and leg swelling.   Gastrointestinal: Positive for abdominal pain and nausea. Negative for diarrhea, heartburn and vomiting.   Genitourinary: Negative for dysuria, flank pain and hematuria.   Musculoskeletal: Negative for back pain, joint pain, myalgias and neck pain.   Skin: Negative for rash.   Neurological: Positive for weakness. Negative for dizziness, sensory change, speech change, focal weakness and headaches.   Psychiatric/Behavioral: The patient is not nervous/anxious.         Physical  Exam  Temp:  [36.2 °C (97.2 °F)-38.3 °C (100.9 °F)] 36.5 °C (97.7 °F)  Pulse:  [73-96] 79  Resp:  [13-19] 18  BP: (112-162)/(61-98) 117/73  SpO2:  [88 %-97 %] 96 %    Physical Exam  Vitals and nursing note reviewed.   HENT:      Head: Normocephalic and atraumatic.      Nose: No congestion.      Mouth/Throat:      Mouth: Mucous membranes are moist.   Eyes:      Extraocular Movements: Extraocular movements intact.      Conjunctiva/sclera: Conjunctivae normal.   Cardiovascular:      Rate and Rhythm: Normal rate and regular rhythm.   Pulmonary:      Effort: Pulmonary effort is normal.      Breath sounds: Normal breath sounds.   Abdominal:      General: There is no distension.      Tenderness: There is abdominal tenderness (mild, epigastric and RUQ). There is no guarding or rebound.   Musculoskeletal:      Cervical back: No tenderness.      Right lower leg: No edema.      Left lower leg: No edema.   Skin:     General: Skin is warm and dry.   Neurological:      General: No focal deficit present.      Mental Status: He is alert and oriented to person, place, and time.      Cranial Nerves: No cranial nerve deficit.         Fluids    Intake/Output Summary (Last 24 hours) at 7/6/2021 1038  Last data filed at 7/6/2021 0738  Gross per 24 hour   Intake 2533.75 ml   Output 1700 ml   Net 833.75 ml       Laboratory  Recent Labs     07/04/21  0334 07/05/21  0432 07/06/21  0613   WBC 11.6* 9.4 8.7   RBC 4.28* 4.26* 4.29*   HEMOGLOBIN 13.3* 13.0* 13.0*   HEMATOCRIT 38.0* 38.1* 37.7*   MCV 88.8 89.4 87.9   MCH 31.1 30.5 30.3   MCHC 35.0 34.1 34.5   RDW 44.6 44.7 42.5   PLATELETCT 143* 141* 150*   MPV 10.2 10.6 10.7     Recent Labs     07/04/21  1102 07/05/21  0432 07/06/21  0613   SODIUM 141 136 137   POTASSIUM 4.0 3.7 4.1   CHLORIDE 106 102 105   CO2 24 22 23   GLUCOSE 120* 88 251*   BUN 20 18 20   CREATININE 1.07 1.03 0.99   CALCIUM 8.8 8.5 8.5     Recent Labs     07/04/21  0334   INR 1.20*         Recent Labs     07/05/21  0432    TRIGLYCERIDE 107   HDL 21*   LDL 66       Imaging  DX-PORTABLE FLUORO > 1 HOUR   Preliminary Result      Portable fluoroscopy utilized for 1 minute 12 seconds.         INTERPRETING LOCATION: [address] [facility], [place], [address]      [id number]   Final Result      1.  Distal common bile duct stone.   2.  No significant biliary dilation.      OUTSIDE IMAGES-CT ABDOMEN /PELVIS   Final Result      US-RUQ   Final Result      1.  Limited exam.      2.  No abnormalities identified      DX-CHEST-PORTABLE (1 VIEW)   Final Result      No acute cardiopulmonary disease.           Assessment/Plan  * Calculus of bile duct with cholangitis- (present on admission)  Assessment & Plan  ERCP sphincterotomy balloon sweep with removal of the bile duct stone. 7/5/2021  IV Rocephin and Flagyl  Pain control, encourage I-S  N.p.o. now  Start IVF hydration  Consult Surgery      Gram-negative bacteremia- (present on admission)  Assessment & Plan  IV Rocephin  Repeat blood cultures tomorrow    Sepsis (HCC)- (present on admission)  Assessment & Plan  This is Sepsis Present on admission  SIRS criteria identified on my evaluation include: Tachycardia, with heart rate greater than 90 BPM and Tachypnea, with respirations greater than 20 per minute  Source -cholangitis, bacteremia  Sepsis protocol initiated  Fluid resuscitation ordered per protocol  IV antibiotics as appropriate for source of sepsis  While organ dysfunction may be noted elsewhere in this problem list or in the chart, degree of organ dysfunction does not meet CMS criteria for severe sepsis          HTN (hypertension)- (present on admission)  Assessment & Plan  Coreg, lisinopril, Norvasc  IV Vasotec and labetalol as needed with parameters      COPD (chronic obstructive pulmonary disease) (HCC)- (present on admission)  Assessment & Plan  Resume Spiriva and Symbicort  RT protocol       VTE prophylaxis: SCDs/TEDs    I have performed a physical exam and reviewed and updated ROS and Plan  today (7/6/2021). In review of yesterday's note (7/5/2021), there are no changes except as documented above.

## 2021-07-06 NOTE — CONSULTS
Trauma Surgery History and Physical    Chief Complaint: Gallstones.     Consult requested by Dr. Grimes from the hospitalist service    History of Present Illness: The patient is a 76 year-old White man who was admitted by the medical service with choledocholithiasis.  Patient has been having ongoing symptoms for greater than 1 year.  He lives in [place].  Patient underwent ERCP yesterday with clearance of his duct.  Initial recommendation was for him to have outpatient cholecystectomy but because of his ongoing symptoms decision has been made to have surgical evaluation done while in-house.  Patient was started on clear liquid diet yesterday but he says he only had a minimal amount this morning.      Past Medical History:  has a past medical history of Arthritis, Cholesterol blood decreased, COPD, mild (HCC), Disc degeneration, Heart burn, High cholesterol, Hypertension, Indigestion, Pneumonia, Psychiatric problem, and Sleep apnea.    Past Surgical History:  has a past surgical history that includes other cardiac surgery (3-4/2012); other abdominal surgery (1987); cervical fusion posterior (8/21/2014); shoulder arthroscopy w/ rotator cuff repair (1991); lumbar laminectomy diskectomy; carpal tunnel release (Left); appendectomy; lumbar laminectomy diskectomy (8/21/2019); pr ercp,diagnostic (N/A, 7/5/2021); and pr ercp,w/removal stone,anne/pancr ducts (N/A, 7/5/2021).    Allergies: No Known Allergies    Current Medications:    Home Medications     Reviewed by Flory Fernando R.N. (Registered Nurse) on 07/04/21 at 0312  Med List Status: Complete   Medication Last Dose Status   amlodipine (NORVASC) 10 MG TABS  Active   budesonide-formoterol (SYMBICORT) 160-4.5 MCG/ACT Aerosol  Active   carvedilol (COREG) 6.25 MG TABS  Active   LISINOPRIL PO  Active   omeprazole (PRILOSEC) 20 MG delayed-release capsule  Active   tiotropium (SPIRIVA) 18 MCG Cap  Active                Family History: family history is not on  "file.    Social History:  reports that he quit smoking about 39 years ago. His smoking use included cigarettes. He has a 60.00 pack-year smoking history. He has never used smokeless tobacco. He reports that he does not drink alcohol and does not use drugs.    Review of Systems: Review of systems is remarkable for the following Right upper quadrant abdominal pain, poor appetite. The remainder of the comprehensive ROS is negative with the exception of the aforementioned HPI, PMH, and PSH bullets in accordance with CMS guideline.    Physical Examination:     Constitutional:     Vital Signs: /73   Pulse 79   Temp 36.5 °C (97.7 °F) (Temporal)   Resp 18   Ht 1.778 m (5' 10\")   Wt 93 kg (205 lb 0.4 oz)   SpO2 96%    General Appearance: The patient is a no distress.  HEENT:    No jaundice or icterus.  Mucous membranes moist  Neck:    Supple with midline trachea no lymphadenopathy  Respiratory:   Inspection: Unlabored respirations, no intercostal retractions, paradoxical motion, or accessory muscle use.   Auscultation: clear to auscultation.  Cardiovascular:   Inspection: The skin is warm and well purfused.  Auscultation: Regular rate and rhythm.   Peripheral Pulses: Normal.   Abdomen:   Inspection: Abdominal inspection reveals prior laparoscopic incisions in the lower abdomen.  No obvious hernias.   Palpation: Palpation is remarkable for minimal tenderness to palpation over the gallbladder.  No rebound or guarding  Musculoskeletal:   No cyanosis edema or deformity  Skin:    Examination of the skin reveals no jaundice.  Neurologic:   Neurologic examination reveals no focal deficits noted.    Laboratory Values:   Recent Labs     07/04/21  0334 07/05/21  0432 07/06/21  0613   WBC 11.6* 9.4 8.7   RBC 4.28* 4.26* 4.29*   HEMOGLOBIN 13.3* 13.0* 13.0*   HEMATOCRIT 38.0* 38.1* 37.7*   MCV 88.8 89.4 87.9   MCH 31.1 30.5 30.3   MCHC 35.0 34.1 34.5   RDW 44.6 44.7 42.5   PLATELETCT 143* 141* 150*   MPV 10.2 10.6 10.7 "     Recent Labs     07/04/21  1102 07/05/21  0432 07/06/21  0613   SODIUM 141 136 137   POTASSIUM 4.0 3.7 4.1   CHLORIDE 106 102 105   CO2 24 22 23   GLUCOSE 120* 88 251*   BUN 20 18 20   CREATININE 1.07 1.03 0.99   CALCIUM 8.8 8.5 8.5     Recent Labs     07/04/21  0334 07/04/21  1102 07/05/21  0432 07/06/21  0613   ASTSGOT  --  254* 109* 42   ALTSGPT  --  246* 159* 96*   TBILIRUBIN  --  5.8* 4.1* 2.1*   ALKPHOSPHAT  --  322* 264* 214*   GLOBULIN  --  3.0 2.6 2.8   INR 1.20*  --   --   --      Recent Labs     07/04/21  0334   INR 1.20*        Imaging:   DX-PORTABLE FLUORO > 1 HOUR   Preliminary Result      Portable fluoroscopy utilized for 1 minute 12 seconds.         INTERPRETING LOCATION: [address] [place] [address], [facility]      [id number]   Final Result      1.  Distal common bile duct stone.   2.  No significant biliary dilation.      OUTSIDE IMAGES-CT ABDOMEN /PELVIS   Final Result      US-RUQ   Final Result      1.  Limited exam.      2.  No abnormalities identified      DX-CHEST-PORTABLE (1 VIEW)   Final Result      No acute cardiopulmonary disease.          Assessment and Plan:   72-year-old male status post ERCP for choledocholithiasis who has had longstanding history of symptomatic gallstones.  Patient lives quite a distance from the hospital and has had chronic symptoms so decision was made not to proceed with cholecystectomy during this hospitalization.  I described the procedure laparoscopic cholecystectomy including its potential risks and complications which include but are not limited to: Bleeding, bile duct injury, bile leak, conversion open procedure, organ space and wound infection as well as cardiopulmonary complications.  Patient understands these risks and wishes to proceed.  He has been scheduled for surgery.      RUQ pain  Notable findings on transfer paperwork include WBC 9.18, Plt 163, Glucose 209 BUN 36, Cr 1.6, , Lipase 16  CT negative  MRCP  RUQ US  Rocephin/Flagyl  7/4/2021:  Right  upper quadrant ultrasound essentially negative.  Patient pending MRCP.  General surgery recommendations appreciated.  Continue present medications and antimicrobials as indicated above.  Will reevaluate on 7/5/2021.  No indication for surgery at present.  7/5/2021:  Status post ERCP because of common bile duct partial obstruction from gallstone.  Patient tolerated procedure well continue with postoperative management      Sepsis (HCC)  This is Sepsis Present on admission  SIRS criteria identified on my evaluation include: Tachycardia, with heart rate greater than 90 BPM and Tachypnea, with respirations greater than 20 per minute  Source -cholangitis, bacteremia  Sepsis protocol initiated  Fluid resuscitation ordered per protocol  IV antibiotics as appropriate for source of sepsis  While organ dysfunction may be noted elsewhere in this problem list or in the chart, degree of organ dysfunction does not meet CMS criteria for severe sepsis          COPD (chronic obstructive pulmonary disease) (HCC)  Resume Spiriva and Symbicort  RT protocol    HTN (hypertension)  Coreg, lisinopril, Norvasc  IV Vasotec and labetalol as needed with parameters      Choledocholithiasis  Patient had MRCP for harmed which showed gallstone and distal common bile duct likely causing transient paroxysmal occlusion and obstruction.  Patient had a ERCP performed today by Dr. Polo [facility].  Patient had extraction of common bile duct stone.  Patient tolerated procedure well.  Continue to follow labs repeat CMP for liver enzymes in the morning    Calculus of bile duct with cholangitis  ERCP sphincterotomy balloon sweep with removal of the bile duct stone. 7/5/2021  IV Rocephin and Flagyl  Pain control, encourage I-S  N.p.o. now  Start IVF hydration  Consult Surgery      Gram-negative bacteremia  IV Rocephin  Repeat blood cultures tomorrow  .       ____________________________________     Jacky Huang D.O.    DD: 7/6/2021  11:11 AM

## 2021-07-06 NOTE — ANESTHESIA PROCEDURE NOTES
Airway    Date/Time: 7/6/2021 11:53 AM  Performed by: Samson Lock M.D.  Authorized by: Samson Lock M.D.     Location:  OR  Urgency:  Elective  Indications for Airway Management:  Anesthesia      Spontaneous Ventilation: absent    Sedation Level:  Deep  Preoxygenated: Yes    Patient Position:  Sniffing  Mask Difficulty Assessment:  2 - vent by mask + OA or adjuvant +/- NMBA  Final Airway Type:  Endotracheal airway  Final Endotracheal Airway:  ETT  Cuffed: Yes    Technique Used for Successful ETT Placement:  Direct laryngoscopy    Insertion Site:  Oral  Blade Type:  Rahel  Laryngoscope Blade/Videolaryngoscope Blade Size:  3  ETT Size (mm):  7.5  Measured from:  Lips  ETT to Lips (cm):  24  Placement Verified by: auscultation and capnometry    Cormack-Lehane Classification:  Grade I - full view of glottis  Number of Attempts at Approach:  1   Atraumatic x1

## 2021-07-06 NOTE — ANESTHESIA PREPROCEDURE EVALUATION
Anes H&P:  PAST MEDICAL HISTORY:   76 y.o. male who presents for Procedure(s) (LRB):  CHOLECYSTECTOMY, LAPAROSCOPIC (N/A).  He has current and past medical problems significant for:    Hx cardiac stent   REDDY  COPD, mild  Hx c spine fusion, full ROM  HTN    Patient denies history of seizures or strokes  Patient denies history of diabetes or thyroid disease  Patient denies history of GERD or esophageal disease  Patient denies history of renal failure  Patient denies history of bleeding disorders  Patient denies history of complications with anesthesia    Able to climb 2 flights of stair without dyspnea or angina, > 4 METs     Past Medical History:   Diagnosis Date   • Arthritis    • Cholesterol blood decreased    • COPD, mild (HCC)    • Disc degeneration    • Heart burn    • High cholesterol    • Hypertension    • Indigestion    • Pneumonia    • Psychiatric problem     PTSD   • Sleep apnea     DOES NOT WEAR CPAP       SMOKING/ALCOHOL/RECREATIONAL DRUG USE:  Social History     Tobacco Use   • Smoking status: Former Smoker     Packs/day: 3.00     Years: 20.00     Pack years: 60.00     Types: Cigarettes     Quit date: 1982     Years since quittin.5   • Smokeless tobacco: Never Used   Vaping Use   • Vaping Use: Never used   Substance Use Topics   • Alcohol use: No     Comment: SOBER FOR 33 YEARS   • Drug use: No     Social History     Substance and Sexual Activity   Drug Use No       PAST SURGICAL HISTORY:  Past Surgical History:   Procedure Laterality Date   • PB ERCP,DIAGNOSTIC N/A 2021    Procedure: ERCP (ENDOSCOPIC RETROGRADE CHOLANGIOPANCREATOGRAPHY);  Surgeon: Ryan Polo M.D.;  Location: [facility];  Service: Gastroenterology   • PB ERCP,W/REMOVAL STONE,RAMON/PANCR DUCTS N/A 2021    Procedure: ERCP,WITH CALCULUS REMOVAL FROM BILE OR PANCREATIC DUCT;  Surgeon: Ryan Polo M.D.;  Location: [facility];  Service: Gastroenterology   • LUMBAR LAMINECTOMY DISKECTOMY  2019     Procedure: LAMINECTOMY, SPINE, LUMBAR, WITH [id number];  Surgeon: Sukhwinder Peralta M.D.;  Location: SURGERY [facility];  Service: Neurosurgery   • CERVICAL FUSION POSTERIOR  8/21/2014    Performed by Sukhwinder Peralta M.D. at SURGERY [facility]   • SHOULDER ARTHROSCOPY W/ ROTATOR CUFF REPAIR  1991    LEFT   • OTHER ABDOMINAL SURGERY  1987    HEMORRHOIDECTOMY   • APPENDECTOMY     • CARPAL TUNNEL RELEASE Left    • LUMBAR LAMINECTOMY DISKECTOMY     • OTHER CARDIAC SURGERY  3-4/2012    CARDIAC STENTS X 2       ALLERGIES:   No Known Allergies    MEDICATIONS:  No current facility-administered medications on file prior to encounter.     Current Outpatient Medications on File Prior to Encounter   Medication Sig Dispense Refill   • budesonide-formoterol (SYMBICORT) 160-4.5 MCG/ACT Aerosol Inhale 2 Puffs 2 times a day.     • tiotropium (SPIRIVA) 18 MCG Cap Place 18 mcg into inhaler and inhale every day.     • omeprazole (PRILOSEC) 20 MG delayed-release capsule Take 40 mg by mouth 2 times a day.     • LISINOPRIL PO Take  by mouth.     • amlodipine (NORVASC) 10 MG TABS Take 10 mg by mouth every day.     • carvedilol (COREG) 6.25 MG TABS Take 6.25 mg by mouth 2 times a day, with meals.         LABS:  Lab Results   Component Value Date/Time    HEMOGLOBIN 13.0 (L) 07/06/2021 0613    HEMATOCRIT 37.7 (L) 07/06/2021 0613    WBC 8.7 07/06/2021 0613     Lab Results   Component Value Date/Time    SODIUM 137 07/06/2021 0613    POTASSIUM 4.1 07/06/2021 0613    CHLORIDE 105 07/06/2021 0613    CO2 23 07/06/2021 0613    GLUCOSE 251 (H) 07/06/2021 0613    BUN 20 07/06/2021 0613    CALCIUM 8.5 07/06/2021 0613       SARS-CoV2 result: Negative      PREVIOUS ANESTHETICS: See EMR  __________________________________________      Relevant Problems   PULMONARY   (positive) COPD (chronic obstructive pulmonary disease) (HCC)      CARDIAC   (positive) HTN (hypertension)       Physical Exam    Airway   Mallampati: II  TM distance:  >3 FB  Neck ROM: full       Cardiovascular - normal exam  Rhythm: regular  Rate: normal  (-) murmur     Dental - normal exam  (+) upper dentures, lower dentures           Pulmonary - normal exam  Breath sounds clear to auscultation     Abdominal    Neurological - normal exam                 Anesthesia Plan    ASA 3   ASA physical status 3 criteria: CAD/stents (> 3 months)    Plan - general       Airway plan will be ETT          Induction: intravenous    Postoperative Plan: Postoperative administration of opioids is intended.    Pertinent diagnostic labs and testing reviewed    Informed Consent:    Anesthetic plan and risks discussed with patient.    Use of blood products discussed with: patient whom consented to blood products.

## 2021-07-06 NOTE — OR NURSING
Patient recovering well post op. AAOx4, with c/o 8/10 abdominal pain, down to 5/10 with oral and IV pain medication. Denies nausea, tolerating sips of water. Abdomen soft, tender, incisions x3 with dermabond CDI, MARÍA ELENA drain dressing CDI with sanguinous output. VSS, afebrile. Wife updated on status and plan of care.

## 2021-07-06 NOTE — ANESTHESIA TIME REPORT
Anesthesia Start and Stop Event Times     Date Time Event    7/6/2021 1142 Ready for Procedure     1143 Anesthesia Start     1323 Anesthesia Stop        Responsible Staff  07/06/21    Name Role Begin End    Samson Lock M.D. Anesth 1143 1323        Preop Diagnosis (Free Text):  Pre-op Diagnosis     choledocholithiasis        Preop Diagnosis (Codes):    Post op Diagnosis  Choledocholithiasis      Premium Reason  Non-Premium    Comments:

## 2021-07-06 NOTE — ANESTHESIA POSTPROCEDURE EVALUATION
Patient: Pérez Willams    Procedure Summary     Date: 07/06/21 Room / Location: [place] [address] / SURGERY [facility]    Anesthesia Start: 1143 Anesthesia Stop: 1323    Procedure: CHOLECYSTECTOMY, LAPAROSCOPIC (N/A Abdomen) Diagnosis: (acute cholecystitis)    Surgeons: Jacky Huang D.O. Responsible Provider: Samson Lock M.D.    Anesthesia Type: general ASA Status: 3          Final Anesthesia Type: general  Last vitals  BP   Blood Pressure : 130/83    Temp   36.6 °C (97.9 °F)    Pulse   85   Resp   15    SpO2   93 %      Anesthesia Post Evaluation    Patient location during evaluation: PACU  Patient participation: complete - patient participated  Level of consciousness: awake and alert  Pain score: 3    Airway patency: patent  Anesthetic complications: no  Cardiovascular status: hemodynamically stable  Respiratory status: acceptable  Hydration status: euvolemic    PONV: none          No complications documented.     Nurse Pain Score: 8 (NPRS)

## 2021-07-07 LAB
ALBUMIN SERPL BCP-MCNC: 3.3 G/DL (ref 3.2–4.9)
ALBUMIN/GLOB SERPL: 1.2 G/DL
ALP SERPL-CCNC: 181 U/L (ref 30–99)
ALT SERPL-CCNC: 87 U/L (ref 2–50)
ANION GAP SERPL CALC-SCNC: 11 MMOL/L (ref 7–16)
AST SERPL-CCNC: 51 U/L (ref 12–45)
BASOPHILS # BLD AUTO: 0.1 % (ref 0–1.8)
BASOPHILS # BLD: 0.02 K/UL (ref 0–0.12)
BILIRUB SERPL-MCNC: 1.1 MG/DL (ref 0.1–1.5)
BUN SERPL-MCNC: 19 MG/DL (ref 8–22)
CALCIUM SERPL-MCNC: 8.5 MG/DL (ref 8.5–10.5)
CHLORIDE SERPL-SCNC: 105 MMOL/L (ref 96–112)
CO2 SERPL-SCNC: 24 MMOL/L (ref 20–33)
CREAT SERPL-MCNC: 1.01 MG/DL (ref 0.5–1.4)
EOSINOPHIL # BLD AUTO: 0 K/UL (ref 0–0.51)
EOSINOPHIL NFR BLD: 0 % (ref 0–6.9)
ERYTHROCYTE [DISTWIDTH] IN BLOOD BY AUTOMATED COUNT: 44.2 FL (ref 35.9–50)
GLOBULIN SER CALC-MCNC: 2.7 G/DL (ref 1.9–3.5)
GLUCOSE SERPL-MCNC: 154 MG/DL (ref 65–99)
HCT VFR BLD AUTO: 35.2 % (ref 42–52)
HGB BLD-MCNC: 12.2 G/DL (ref 14–18)
IMM GRANULOCYTES # BLD AUTO: 0.09 K/UL (ref 0–0.11)
IMM GRANULOCYTES NFR BLD AUTO: 0.6 % (ref 0–0.9)
LYMPHOCYTES # BLD AUTO: 0.68 K/UL (ref 1–4.8)
LYMPHOCYTES NFR BLD: 4.8 % (ref 22–41)
MCH RBC QN AUTO: 30.5 PG (ref 27–33)
MCHC RBC AUTO-ENTMCNC: 34.7 G/DL (ref 33.7–35.3)
MCV RBC AUTO: 88 FL (ref 81.4–97.8)
MONOCYTES # BLD AUTO: 0.84 K/UL (ref 0–0.85)
MONOCYTES NFR BLD AUTO: 5.9 % (ref 0–13.4)
NEUTROPHILS # BLD AUTO: 12.58 K/UL (ref 1.82–7.42)
NEUTROPHILS NFR BLD: 88.6 % (ref 44–72)
NRBC # BLD AUTO: 0 K/UL
NRBC BLD-RTO: 0 /100 WBC
PLATELET # BLD AUTO: 206 K/UL (ref 164–446)
PMV BLD AUTO: 10.6 FL (ref 9–12.9)
POTASSIUM SERPL-SCNC: 4.1 MMOL/L (ref 3.6–5.5)
PROT SERPL-MCNC: 6 G/DL (ref 6–8.2)
RBC # BLD AUTO: 4 M/UL (ref 4.7–6.1)
SODIUM SERPL-SCNC: 140 MMOL/L (ref 135–145)
WBC # BLD AUTO: 14.2 K/UL (ref 4.8–10.8)

## 2021-07-07 PROCEDURE — 700102 HCHG RX REV CODE 250 W/ 637 OVERRIDE(OP): Performed by: STUDENT IN AN ORGANIZED HEALTH CARE EDUCATION/TRAINING PROGRAM

## 2021-07-07 PROCEDURE — A9270 NON-COVERED ITEM OR SERVICE: HCPCS | Performed by: STUDENT IN AN ORGANIZED HEALTH CARE EDUCATION/TRAINING PROGRAM

## 2021-07-07 PROCEDURE — 700111 HCHG RX REV CODE 636 W/ 250 OVERRIDE (IP): Performed by: SURGERY

## 2021-07-07 PROCEDURE — 99232 SBSQ HOSP IP/OBS MODERATE 35: CPT | Performed by: FAMILY MEDICINE

## 2021-07-07 PROCEDURE — 700102 HCHG RX REV CODE 250 W/ 637 OVERRIDE(OP): Performed by: SURGERY

## 2021-07-07 PROCEDURE — 700105 HCHG RX REV CODE 258: Performed by: STUDENT IN AN ORGANIZED HEALTH CARE EDUCATION/TRAINING PROGRAM

## 2021-07-07 PROCEDURE — 36415 COLL VENOUS BLD VENIPUNCTURE: CPT

## 2021-07-07 PROCEDURE — A9270 NON-COVERED ITEM OR SERVICE: HCPCS | Performed by: FAMILY MEDICINE

## 2021-07-07 PROCEDURE — A9270 NON-COVERED ITEM OR SERVICE: HCPCS | Performed by: SURGERY

## 2021-07-07 PROCEDURE — 80053 COMPREHEN METABOLIC PANEL: CPT

## 2021-07-07 PROCEDURE — 700102 HCHG RX REV CODE 250 W/ 637 OVERRIDE(OP): Performed by: FAMILY MEDICINE

## 2021-07-07 PROCEDURE — 85025 COMPLETE CBC W/AUTO DIFF WBC: CPT

## 2021-07-07 PROCEDURE — 700111 HCHG RX REV CODE 636 W/ 250 OVERRIDE (IP): Performed by: STUDENT IN AN ORGANIZED HEALTH CARE EDUCATION/TRAINING PROGRAM

## 2021-07-07 PROCEDURE — 770006 HCHG ROOM/CARE - MED/SURG/GYN SEMI*

## 2021-07-07 PROCEDURE — 87040 BLOOD CULTURE FOR BACTERIA: CPT | Mod: 91

## 2021-07-07 RX ADMIN — BUDESONIDE AND FORMOTEROL FUMARATE DIHYDRATE 2 PUFF: 160; 4.5 AEROSOL RESPIRATORY (INHALATION) at 04:43

## 2021-07-07 RX ADMIN — SODIUM CHLORIDE: 9 INJECTION, SOLUTION INTRAVENOUS at 14:17

## 2021-07-07 RX ADMIN — AMLODIPINE BESYLATE 10 MG: 10 TABLET ORAL at 04:42

## 2021-07-07 RX ADMIN — OXYCODONE HYDROCHLORIDE 10 MG: 10 TABLET ORAL at 00:04

## 2021-07-07 RX ADMIN — OXYCODONE 5 MG: 5 TABLET ORAL at 21:31

## 2021-07-07 RX ADMIN — DOCUSATE SODIUM 50 MG AND SENNOSIDES 8.6 MG 2 TABLET: 8.6; 5 TABLET, FILM COATED ORAL at 21:31

## 2021-07-07 RX ADMIN — TIOTROPIUM BROMIDE INHALATION SPRAY 2.5 MCG: 3.12 SPRAY, METERED RESPIRATORY (INHALATION) at 04:43

## 2021-07-07 RX ADMIN — METRONIDAZOLE 500 MG: 500 TABLET ORAL at 14:17

## 2021-07-07 RX ADMIN — BUDESONIDE AND FORMOTEROL FUMARATE DIHYDRATE 2 PUFF: 160; 4.5 AEROSOL RESPIRATORY (INHALATION) at 17:15

## 2021-07-07 RX ADMIN — OMEPRAZOLE 40 MG: 20 CAPSULE, DELAYED RELEASE ORAL at 18:07

## 2021-07-07 RX ADMIN — METRONIDAZOLE 500 MG: 500 TABLET ORAL at 21:31

## 2021-07-07 RX ADMIN — METRONIDAZOLE 500 MG: 500 TABLET ORAL at 04:42

## 2021-07-07 RX ADMIN — CARVEDILOL 6.25 MG: 6.25 TABLET, FILM COATED ORAL at 08:05

## 2021-07-07 RX ADMIN — LISINOPRIL 2.5 MG: 5 TABLET ORAL at 04:43

## 2021-07-07 RX ADMIN — OXYCODONE HYDROCHLORIDE 10 MG: 10 TABLET ORAL at 10:00

## 2021-07-07 RX ADMIN — DOCUSATE SODIUM 50 MG AND SENNOSIDES 8.6 MG 2 TABLET: 8.6; 5 TABLET, FILM COATED ORAL at 04:42

## 2021-07-07 RX ADMIN — CARVEDILOL 6.25 MG: 6.25 TABLET, FILM COATED ORAL at 17:15

## 2021-07-07 RX ADMIN — OXYCODONE HYDROCHLORIDE 10 MG: 10 TABLET ORAL at 04:42

## 2021-07-07 RX ADMIN — OMEPRAZOLE 40 MG: 20 CAPSULE, DELAYED RELEASE ORAL at 04:42

## 2021-07-07 RX ADMIN — OXYCODONE HYDROCHLORIDE 10 MG: 10 TABLET ORAL at 18:07

## 2021-07-07 RX ADMIN — CEFTRIAXONE SODIUM 2 G: 10 INJECTION, POWDER, FOR SOLUTION INTRAVENOUS at 04:42

## 2021-07-07 ASSESSMENT — ENCOUNTER SYMPTOMS
ABDOMINAL PAIN: 1
SPEECH CHANGE: 0
MYALGIAS: 0
WEAKNESS: 0
BACK PAIN: 0
DIZZINESS: 0
SHORTNESS OF BREATH: 0
CHILLS: 0
HEARTBURN: 0
NAUSEA: 0
HEADACHES: 0
PALPITATIONS: 0
SENSORY CHANGE: 0
DIAPHORESIS: 0
VOMITING: 0
DIARRHEA: 0
FOCAL WEAKNESS: 0
NECK PAIN: 0
NERVOUS/ANXIOUS: 0
FEVER: 0
COUGH: 0
WHEEZING: 0
BLURRED VISION: 0
SORE THROAT: 0
FLANK PAIN: 0

## 2021-07-07 ASSESSMENT — PAIN DESCRIPTION - PAIN TYPE
TYPE: SURGICAL PAIN

## 2021-07-07 NOTE — PROGRESS NOTES
Report received from RN, assumed care at 0700  Pt is A0X4, and responds appropriately   Pt declines any SOB, chest pain, new onset of numbness/ tingiling  Pt rates pain at 6/10, on a scale of 1-10, pt medicated per MAR  Pt is voiding adequatly and without hesitancy  Pt has + flatus, + bowel sounds,  BM on 7/6/2021 prior to surgery  Pt ambulates with a stand by assist   Pt is tolerating a regular diet, pt denies any nausea/vomiting  Pt has x3 lap sites to abdomen, approximated with derma bond, open to air, no dranige noted   Pt has x1 RUQ MARÍA ELENA compressed to self suction, dressing noted to be saturated, dressing changed and is clean, dry and intact     Plan of care discussed, all questions answered. Explained importance of calling before getting OOB and pt verbalizes understanding. Explained importance of oral care. Call light is within reach, treaded slipper socks on, bed in lowest/ locked position, hourly rounding in place, all needs met at this time

## 2021-07-07 NOTE — PROGRESS NOTES
"Surgery    Postop day 1 after laparoscopic cholecystectomy    Fair amount of postop pain last night but seems better today  Eating some  Ambulating  Having flatus    /65   Pulse 74   Temp 36.3 °C (97.3 °F) (Temporal)   Resp 17   Ht 1.778 m (5' 10\")   Wt 93 kg (205 lb 0.4 oz)   SpO2 93%   BMI 29.42 kg/m²     Drain output 70 cc since surgery    Abdomen softly distended  Minimal incisional tenderness  Incisions CDI  Drain serosanguineous    Recent Labs     07/05/21 0432 07/06/21 0613 07/07/21 0342   WBC 9.4 8.7 14.2*   RBC 4.26* 4.29* 4.00*   HEMOGLOBIN 13.0* 13.0* 12.2*   HEMATOCRIT 38.1* 37.7* 35.2*   MCV 89.4 87.9 88.0   MCH 30.5 30.3 30.5   RDW 44.7 42.5 44.2   PLATELETCT 141* 150* 206   MPV 10.6 10.7 10.6   NEUTSPOLYS  --  87.80* 88.60*   LYMPHOCYTES  --  5.90* 4.80*   MONOCYTES  --  5.60 5.90   EOSINOPHILS  --  0.00 0.00   BASOPHILS  --  0.10 0.10       Recent Labs     07/05/21 0432 07/06/21 0613 07/07/21 0342   ASTSGOT 109* 42 51*   ALTSGPT 159* 96* 87*   TBILIRUBIN 4.1* 2.1* 1.1   ALKPHOSPHAT 264* 214* 181*   GLOBULIN 2.6 2.8 2.7       Assessment and plan:  Status post laparoscopic cholecystectomy  Doing okay  Plan to DC drain in a.m.  Slowly advance diet    Hopefully will be ready for DC in the morning  General care per medical service  "

## 2021-07-07 NOTE — PROGRESS NOTES
[facility] Medicine Daily Progress Note    Date of Service  7/7/2021    Chief Complaint  Pérez Willams is a 76 y.o. male admitted 7/4/2021 with cholangitis.    Hospital Course  Admitted with sepsis secondary to choledocholithiasis with cholangitis.  He was started on empiric coverage with IV Rocephin and Flagyl.  Gastroenterology was consulted on the case, patient underwent ERCP with sphincterotomy and balloon sweep with removal of the bile duct stone on 7/5/2021.  His blood cultures also showed gram-negative bacteremia.  Surgery was consulted on the case, patient underwent laparoscopic cholecystectomy on 7/6/2021    Interval Problem Update  Cholangitis/choledocholithiasis - Lap robin done yesterday, pain controlled  Bacteremia - cultures showed Klebsiella, repeat cultures done today  HTN - sbp 110-140    I have personally seen and examined the patient at bedside. I discussed the plan of care with patient, family, bedside RN, charge RN,  and general surgery.    Consultants/Specialty  general surgery and GI    Code Status  Full Code    Disposition  Patient is not medically cleared.   Anticipate discharge to to home with close outpatient follow-up.  I have placed the appropriate orders for post-discharge needs.    Review of Systems  Review of Systems   Constitutional: Negative for chills, diaphoresis, fever and malaise/fatigue.   HENT: Negative for congestion, hearing loss and sore throat.    Eyes: Negative for blurred vision.   Respiratory: Negative for cough, shortness of breath and wheezing.    Cardiovascular: Negative for chest pain, palpitations and leg swelling.   Gastrointestinal: Positive for abdominal pain. Negative for diarrhea, heartburn, nausea and vomiting.   Genitourinary: Negative for dysuria, flank pain and hematuria.   Musculoskeletal: Negative for back pain, joint pain, myalgias and neck pain.   Skin: Negative for rash.   Neurological: Negative for dizziness, sensory change, speech  change, focal weakness, weakness and headaches.   Psychiatric/Behavioral: The patient is not nervous/anxious.         Physical Exam  Temp:  [36.1 °C (97 °F)-37.2 °C (98.9 °F)] 36.3 °C (97.3 °F)  Pulse:  [66-96] 74  Resp:  [12-23] 17  BP: (112-140)/(59-86) 112/65  SpO2:  [91 %-96 %] 93 %    Physical Exam  Vitals and nursing note reviewed.   HENT:      Head: Normocephalic and atraumatic.      Nose: No congestion.      Mouth/Throat:      Mouth: Mucous membranes are moist.   Eyes:      Extraocular Movements: Extraocular movements intact.      Conjunctiva/sclera: Conjunctivae normal.   Cardiovascular:      Rate and Rhythm: Normal rate and regular rhythm.   Pulmonary:      Effort: Pulmonary effort is normal.      Breath sounds: Normal breath sounds.   Abdominal:      General: There is no distension.      Tenderness: There is abdominal tenderness (mild, epigastric and RUQ). There is no guarding or rebound.      Comments: MARÍA ELENA drain   Musculoskeletal:      Cervical back: No tenderness.      Right lower leg: No edema.      Left lower leg: No edema.   Skin:     General: Skin is warm and dry.   Neurological:      General: No focal deficit present.      Mental Status: He is alert and oriented to person, place, and time.      Cranial Nerves: No cranial nerve deficit.         Fluids    Intake/Output Summary (Last 24 hours) at 7/7/2021 1253  Last data filed at 7/7/2021 1140  Gross per 24 hour   Intake 1360 ml   Output 760 ml   Net 600 ml       Laboratory  Recent Labs     07/05/21  0432 07/06/21  0613 07/07/21  0342   WBC 9.4 8.7 14.2*   RBC 4.26* 4.29* 4.00*   HEMOGLOBIN 13.0* 13.0* 12.2*   HEMATOCRIT 38.1* 37.7* 35.2*   MCV 89.4 87.9 88.0   MCH 30.5 30.3 30.5   MCHC 34.1 34.5 34.7   RDW 44.7 42.5 44.2   PLATELETCT 141* 150* 206   MPV 10.6 10.7 10.6     Recent Labs     07/05/21  0432 07/06/21  0613 07/07/21  0342   SODIUM 136 137 140   POTASSIUM 3.7 4.1 4.1   CHLORIDE 102 105 105   CO2 22 23 24   GLUCOSE 88 251* 154*   BUN 18 20 19    CREATININE 1.03 0.99 1.01   CALCIUM 8.5 8.5 8.5             Recent Labs     07/05/21  0432   TRIGLYCERIDE 107   HDL 21*   LDL 66       Imaging  DX-PORTABLE FLUORO > 1 HOUR   Final Result      Portable fluoroscopy utilized for 1 minute 12 seconds.         INTERPRETING LOCATION: [address], [place], [address]      [id number]   Final Result      1.  Distal common bile duct stone.   2.  No significant biliary dilation.      OUTSIDE IMAGES-CT ABDOMEN /PELVIS   Final Result      US-RUQ   Final Result      1.  Limited exam.      2.  No abnormalities identified      DX-CHEST-PORTABLE (1 VIEW)   Final Result      No acute cardiopulmonary disease.           Assessment/Plan  * Calculus of bile duct with cholangitis- (present on admission)  Assessment & Plan  ERCP sphincterotomy balloon sweep with removal of the bile duct stone. 7/5/2021  Laparoscopic cholecystectomy 7/6/2021  IV Rocephin and Flagyl  Pain control, encourage I-S      Gram-negative bacteremia- (present on admission)  Assessment & Plan  IV Rocephin  Follow repeat blood cultures     Sepsis (HCC)- (present on admission)  Assessment & Plan  This is Sepsis Present on admission  SIRS criteria identified on my evaluation include: Tachycardia, with heart rate greater than 90 BPM and Tachypnea, with respirations greater than 20 per minute  Source -cholangitis, bacteremia  Sepsis protocol initiated  Fluid resuscitation ordered per protocol  IV antibiotics as appropriate for source of sepsis  While organ dysfunction may be noted elsewhere in this problem list or in the chart, degree of organ dysfunction does not meet CMS criteria for severe sepsis          HTN (hypertension)- (present on admission)  Assessment & Plan  Coreg, Lisinopril, Norvasc  IV Vasotec and labetalol as needed with parameters      COPD (chronic obstructive pulmonary disease) (HCC)- (present on admission)  Assessment & Plan  Spiriva and Symbicort  RT protocol       VTE prophylaxis: SCDs/TEDs    I have  performed a physical exam and reviewed and updated ROS and Plan today (7/7/2021). In review of yesterday's note (7/6/2021), there are no changes except as documented above.

## 2021-07-07 NOTE — CARE PLAN
The patient is Stable - Low risk of patient condition declining or worsening    Shift Goals  Clinical Goals: pain control, rest, tolerate diet, ambulation   Patient Goals: pain control, rest          Problem: Knowledge Deficit - Standard  Goal: Patient and family/care givers will demonstrate understanding of plan of care, disease process/condition, diagnostic tests and medications  Outcome: Progressing     Problem: Pain - Standard  Goal: Alleviation of pain or a reduction in pain to the patient’s comfort goal  Outcome: Progressing     Problem: Fall Risk  Goal: Patient will remain free from falls  Outcome: Progressing

## 2021-07-08 LAB
ALBUMIN SERPL BCP-MCNC: 3.2 G/DL (ref 3.2–4.9)
ALBUMIN/GLOB SERPL: 1.3 G/DL
ALP SERPL-CCNC: 158 U/L (ref 30–99)
ALT SERPL-CCNC: 80 U/L (ref 2–50)
ANION GAP SERPL CALC-SCNC: 9 MMOL/L (ref 7–16)
AST SERPL-CCNC: 69 U/L (ref 12–45)
BASOPHILS # BLD AUTO: 0.1 % (ref 0–1.8)
BASOPHILS # BLD: 0.01 K/UL (ref 0–0.12)
BILIRUB SERPL-MCNC: 0.8 MG/DL (ref 0.1–1.5)
BUN SERPL-MCNC: 15 MG/DL (ref 8–22)
CALCIUM SERPL-MCNC: 8.1 MG/DL (ref 8.5–10.5)
CHLORIDE SERPL-SCNC: 106 MMOL/L (ref 96–112)
CO2 SERPL-SCNC: 28 MMOL/L (ref 20–33)
CREAT SERPL-MCNC: 0.92 MG/DL (ref 0.5–1.4)
EOSINOPHIL # BLD AUTO: 0.01 K/UL (ref 0–0.51)
EOSINOPHIL NFR BLD: 0.1 % (ref 0–6.9)
ERYTHROCYTE [DISTWIDTH] IN BLOOD BY AUTOMATED COUNT: 47.4 FL (ref 35.9–50)
GLOBULIN SER CALC-MCNC: 2.4 G/DL (ref 1.9–3.5)
GLUCOSE SERPL-MCNC: 112 MG/DL (ref 65–99)
HCT VFR BLD AUTO: 37.1 % (ref 42–52)
HGB BLD-MCNC: 12.1 G/DL (ref 14–18)
IMM GRANULOCYTES # BLD AUTO: 0.07 K/UL (ref 0–0.11)
IMM GRANULOCYTES NFR BLD AUTO: 1 % (ref 0–0.9)
LYMPHOCYTES # BLD AUTO: 1.38 K/UL (ref 1–4.8)
LYMPHOCYTES NFR BLD: 19.3 % (ref 22–41)
MCH RBC QN AUTO: 29.8 PG (ref 27–33)
MCHC RBC AUTO-ENTMCNC: 32.6 G/DL (ref 33.7–35.3)
MCV RBC AUTO: 91.4 FL (ref 81.4–97.8)
MONOCYTES # BLD AUTO: 0.67 K/UL (ref 0–0.85)
MONOCYTES NFR BLD AUTO: 9.4 % (ref 0–13.4)
NEUTROPHILS # BLD AUTO: 5.01 K/UL (ref 1.82–7.42)
NEUTROPHILS NFR BLD: 70.1 % (ref 44–72)
NRBC # BLD AUTO: 0 K/UL
NRBC BLD-RTO: 0 /100 WBC
PLATELET # BLD AUTO: 204 K/UL (ref 164–446)
PMV BLD AUTO: 10.7 FL (ref 9–12.9)
POTASSIUM SERPL-SCNC: 4.1 MMOL/L (ref 3.6–5.5)
PROT SERPL-MCNC: 5.6 G/DL (ref 6–8.2)
RBC # BLD AUTO: 4.06 M/UL (ref 4.7–6.1)
SODIUM SERPL-SCNC: 143 MMOL/L (ref 135–145)
WBC # BLD AUTO: 7.2 K/UL (ref 4.8–10.8)

## 2021-07-08 PROCEDURE — 700102 HCHG RX REV CODE 250 W/ 637 OVERRIDE(OP): Performed by: STUDENT IN AN ORGANIZED HEALTH CARE EDUCATION/TRAINING PROGRAM

## 2021-07-08 PROCEDURE — 700105 HCHG RX REV CODE 258: Performed by: STUDENT IN AN ORGANIZED HEALTH CARE EDUCATION/TRAINING PROGRAM

## 2021-07-08 PROCEDURE — 770006 HCHG ROOM/CARE - MED/SURG/GYN SEMI*

## 2021-07-08 PROCEDURE — 85025 COMPLETE CBC W/AUTO DIFF WBC: CPT

## 2021-07-08 PROCEDURE — A9270 NON-COVERED ITEM OR SERVICE: HCPCS | Performed by: STUDENT IN AN ORGANIZED HEALTH CARE EDUCATION/TRAINING PROGRAM

## 2021-07-08 PROCEDURE — 700102 HCHG RX REV CODE 250 W/ 637 OVERRIDE(OP): Performed by: SURGERY

## 2021-07-08 PROCEDURE — 80053 COMPREHEN METABOLIC PANEL: CPT

## 2021-07-08 PROCEDURE — 36415 COLL VENOUS BLD VENIPUNCTURE: CPT

## 2021-07-08 PROCEDURE — 700111 HCHG RX REV CODE 636 W/ 250 OVERRIDE (IP): Performed by: SURGERY

## 2021-07-08 PROCEDURE — A9270 NON-COVERED ITEM OR SERVICE: HCPCS | Performed by: SURGERY

## 2021-07-08 PROCEDURE — 99232 SBSQ HOSP IP/OBS MODERATE 35: CPT | Performed by: FAMILY MEDICINE

## 2021-07-08 PROCEDURE — 700111 HCHG RX REV CODE 636 W/ 250 OVERRIDE (IP): Performed by: FAMILY MEDICINE

## 2021-07-08 RX ADMIN — TIOTROPIUM BROMIDE INHALATION SPRAY 2.5 MCG: 3.12 SPRAY, METERED RESPIRATORY (INHALATION) at 06:30

## 2021-07-08 RX ADMIN — CARVEDILOL 6.25 MG: 6.25 TABLET, FILM COATED ORAL at 17:05

## 2021-07-08 RX ADMIN — METRONIDAZOLE 500 MG: 500 TABLET ORAL at 06:28

## 2021-07-08 RX ADMIN — LISINOPRIL 2.5 MG: 5 TABLET ORAL at 06:28

## 2021-07-08 RX ADMIN — OXYCODONE HYDROCHLORIDE 10 MG: 10 TABLET ORAL at 06:33

## 2021-07-08 RX ADMIN — CEFTRIAXONE SODIUM 2 G: 10 INJECTION, POWDER, FOR SOLUTION INTRAVENOUS at 06:29

## 2021-07-08 RX ADMIN — METRONIDAZOLE 500 MG: 500 TABLET ORAL at 21:56

## 2021-07-08 RX ADMIN — SODIUM CHLORIDE: 9 INJECTION, SOLUTION INTRAVENOUS at 21:56

## 2021-07-08 RX ADMIN — DOCUSATE SODIUM 50 MG AND SENNOSIDES 8.6 MG 2 TABLET: 8.6; 5 TABLET, FILM COATED ORAL at 06:27

## 2021-07-08 RX ADMIN — OXYCODONE HYDROCHLORIDE 10 MG: 10 TABLET ORAL at 21:56

## 2021-07-08 RX ADMIN — OMEPRAZOLE 40 MG: 20 CAPSULE, DELAYED RELEASE ORAL at 06:28

## 2021-07-08 RX ADMIN — DOCUSATE SODIUM 50 MG AND SENNOSIDES 8.6 MG 2 TABLET: 8.6; 5 TABLET, FILM COATED ORAL at 17:05

## 2021-07-08 RX ADMIN — AMLODIPINE BESYLATE 10 MG: 10 TABLET ORAL at 06:27

## 2021-07-08 RX ADMIN — OMEPRAZOLE 40 MG: 20 CAPSULE, DELAYED RELEASE ORAL at 17:05

## 2021-07-08 RX ADMIN — POLYETHYLENE GLYCOL 3350 1 PACKET: 17 POWDER, FOR SOLUTION ORAL at 06:27

## 2021-07-08 RX ADMIN — BUDESONIDE AND FORMOTEROL FUMARATE DIHYDRATE 2 PUFF: 160; 4.5 AEROSOL RESPIRATORY (INHALATION) at 06:28

## 2021-07-08 RX ADMIN — CARVEDILOL 6.25 MG: 6.25 TABLET, FILM COATED ORAL at 06:28

## 2021-07-08 RX ADMIN — METRONIDAZOLE 500 MG: 500 TABLET ORAL at 14:32

## 2021-07-08 ASSESSMENT — ENCOUNTER SYMPTOMS
SORE THROAT: 0
HEADACHES: 0
FLANK PAIN: 0
MYALGIAS: 0
SPEECH CHANGE: 0
CHILLS: 0
NECK PAIN: 0
DIARRHEA: 0
NAUSEA: 0
BACK PAIN: 0
DIAPHORESIS: 0
FEVER: 0
COUGH: 0
SENSORY CHANGE: 0
WEAKNESS: 0
ABDOMINAL PAIN: 1
FOCAL WEAKNESS: 0
VOMITING: 0
SHORTNESS OF BREATH: 0
BLURRED VISION: 0
DIZZINESS: 0
NERVOUS/ANXIOUS: 0
WHEEZING: 0
HEARTBURN: 0
PALPITATIONS: 0

## 2021-07-08 ASSESSMENT — PAIN DESCRIPTION - PAIN TYPE
TYPE: ACUTE PAIN;SURGICAL PAIN
TYPE: ACUTE PAIN
TYPE: SURGICAL PAIN
TYPE: SURGICAL PAIN
TYPE: ACUTE PAIN

## 2021-07-08 ASSESSMENT — FIBROSIS 4 INDEX: FIB4 SCORE: 2.8

## 2021-07-08 NOTE — PROGRESS NOTES
[facility] Medicine Daily Progress Note    Date of Service  7/8/2021    Chief Complaint  Pérez Willams is a 76 y.o. male admitted 7/4/2021 with cholangitis.    Hospital Course  Admitted with sepsis secondary to choledocholithiasis with cholangitis.  He was started on empiric coverage with IV Rocephin and Flagyl.  Gastroenterology was consulted on the case, patient underwent ERCP with sphincterotomy and balloon sweep with removal of the bile duct stone on 7/5/2021.  His blood cultures also showed gram-negative bacteremia.  Surgery was consulted on the case, patient underwent laparoscopic cholecystectomy on 7/6/2021    Interval Problem Update  Cholangitis/choledocholithiasis - pain controlled, MARÍA ELENA drain removed, discussed case with Surgery  Bacteremia - cultures showed Klebsiella, repeat cultures pending  HTN - sbp 120-140    I have personally seen and examined the patient at bedside. I discussed the plan of care with patient, bedside RN, charge RN,  and general surgery.    Consultants/Specialty  general surgery and GI    Code Status  Full Code    Disposition  Patient is not medically cleared.   Anticipate discharge to to home with close outpatient follow-up.  I have placed the appropriate orders for post-discharge needs.    Review of Systems  Review of Systems   Constitutional: Negative for chills, diaphoresis, fever and malaise/fatigue.   HENT: Negative for congestion, hearing loss and sore throat.    Eyes: Negative for blurred vision.   Respiratory: Negative for cough, shortness of breath and wheezing.    Cardiovascular: Negative for chest pain, palpitations and leg swelling.   Gastrointestinal: Positive for abdominal pain. Negative for diarrhea, heartburn, nausea and vomiting.   Genitourinary: Negative for dysuria, flank pain and hematuria.   Musculoskeletal: Negative for back pain, joint pain, myalgias and neck pain.   Skin: Negative for rash.   Neurological: Negative for dizziness, sensory  change, speech change, focal weakness, weakness and headaches.   Psychiatric/Behavioral: The patient is not nervous/anxious.         Physical Exam  Temp:  [36.6 °C (97.9 °F)-36.9 °C (98.4 °F)] 36.9 °C (98.4 °F)  Pulse:  [64-85] 64  Resp:  [18] 18  BP: (126-141)/(75-87) 138/79  SpO2:  [90 %-96 %] 91 %    Physical Exam  Vitals and nursing note reviewed.   HENT:      Head: Normocephalic and atraumatic.      Nose: No congestion.      Mouth/Throat:      Mouth: Mucous membranes are moist.   Eyes:      Extraocular Movements: Extraocular movements intact.      Conjunctiva/sclera: Conjunctivae normal.   Cardiovascular:      Rate and Rhythm: Normal rate and regular rhythm.   Pulmonary:      Effort: Pulmonary effort is normal.      Breath sounds: Normal breath sounds.   Abdominal:      General: There is no distension.      Tenderness: There is abdominal tenderness (mild, epigastric and RUQ). There is no guarding or rebound.   Musculoskeletal:      Cervical back: No tenderness.      Right lower leg: No edema.      Left lower leg: No edema.   Skin:     General: Skin is warm and dry.   Neurological:      General: No focal deficit present.      Mental Status: He is alert and oriented to person, place, and time.      Cranial Nerves: No cranial nerve deficit.         Fluids    Intake/Output Summary (Last 24 hours) at 7/8/2021 1035  Last data filed at 7/8/2021 0805  Gross per 24 hour   Intake 440 ml   Output 1295 ml   Net -855 ml       Laboratory  Recent Labs     07/06/21  0613 07/07/21  0342 07/08/21  0522   WBC 8.7 14.2* 7.2   RBC 4.29* 4.00* 4.06*   HEMOGLOBIN 13.0* 12.2* 12.1*   HEMATOCRIT 37.7* 35.2* 37.1*   MCV 87.9 88.0 91.4   MCH 30.3 30.5 29.8   MCHC 34.5 34.7 32.6*   RDW 42.5 44.2 47.4   PLATELETCT 150* 206 204   MPV 10.7 10.6 10.7     Recent Labs     07/06/21  0613 07/07/21  0342 07/08/21  0522   SODIUM 137 140 143   POTASSIUM 4.1 4.1 4.1   CHLORIDE 105 105 106   CO2 23 24 28   GLUCOSE 251* 154* 112*   BUN 20 19 15    CREATININE 0.99 1.01 0.92   CALCIUM 8.5 8.5 8.1*                   Imaging  DX-PORTABLE FLUORO > 1 HOUR   Final Result      Portable fluoroscopy utilized for 1 minute 12 seconds.         INTERPRETING LOCATION: [address] [facility] [facility] [address], [place], [address]      [id number]   Final Result      1.  Distal common bile duct stone.   2.  No significant biliary dilation.      OUTSIDE IMAGES-CT ABDOMEN /PELVIS   Final Result      US-RUQ   Final Result      1.  Limited exam.      2.  No abnormalities identified      DX-CHEST-PORTABLE (1 VIEW)   Final Result      No acute cardiopulmonary disease.           Assessment/Plan  * Calculus of bile duct with cholangitis- (present on admission)  Assessment & Plan  ERCP sphincterotomy balloon sweep with removal of the bile duct stone. 7/5/2021  Laparoscopic cholecystectomy 7/6/2021  IV Rocephin and Flagyl  Pain control, encourage I-S      Gram-negative bacteremia- (present on admission)  Assessment & Plan  IV Rocephin  Follow repeat blood cultures     Sepsis (HCC)- (present on admission)  Assessment & Plan  This is Sepsis Present on admission  SIRS criteria identified on my evaluation include: Tachycardia, with heart rate greater than 90 BPM and Tachypnea, with respirations greater than 20 per minute  Source -cholangitis, bacteremia  Sepsis protocol initiated  Fluid resuscitation ordered per protocol  IV antibiotics as appropriate for source of sepsis  While organ dysfunction may be noted elsewhere in this problem list or in the chart, degree of organ dysfunction does not meet CMS criteria for severe sepsis          HTN (hypertension)- (present on admission)  Assessment & Plan  Coreg, Lisinopril, Norvasc  IV Vasotec and labetalol as needed with parameters      COPD (chronic obstructive pulmonary disease) (HCC)- (present on admission)  Assessment & Plan  Spiriva and Symbicort  RT protocol       VTE prophylaxis: enoxaparin ppx    I have performed a physical exam and reviewed and updated ROS and  Plan today (7/8/2021). In review of yesterday's note (7/7/2021), there are no changes except as documented above.

## 2021-07-08 NOTE — PROGRESS NOTES
Bedside report received. Assessment completed.  Pt is A&O x4. Pt on room air, but 2L while sleeping.   Medicating for pain PRN per MAR Pain 0/10  Denies nausea, - numbness, - tingling.  Skin 3 lap sites with dermabond and MARÍA ELENA in place.   LDA CDI   Last BM 7/6/21, distended, +flatus, +void.  Regular diet. Tolerates well.   Pt up independently.  Call light and belongings within reach. All needs met at this time. Fall Precautions and hourly rounding in place.

## 2021-07-08 NOTE — CARE PLAN
Problem: Knowledge Deficit - Standard  Goal: Patient and family/care givers will demonstrate understanding of plan of care, disease process/condition, diagnostic tests and medications  Outcome: Progressing  Patient has understanding of POC.  Problem: Pain - Standard  Goal: Alleviation of pain or a reduction in pain to the patient’s comfort goal  Outcome: Progressing  Patient voices needs appropriately.  Problem: Fall Risk  Goal: Patient will remain free from falls  Outcome: Progressing  Patient uses call light appropriately.   The patient is Stable - Low risk of patient condition declining or worsening    Shift Goals  Clinical Goals: discharge  Patient Goals: discharge

## 2021-07-08 NOTE — PROGRESS NOTES
"Surgery    Blood cx +  Min drain output  Taking PO  Mobilizing    /79   Pulse 64   Temp 36.9 °C (98.4 °F) (Temporal)   Resp 18   Ht 1.778 m (5' 10\")   Wt 93 kg (205 lb 0.4 oz)   SpO2 91%   BMI 29.42 kg/m²     Drain ouput < 30cc    NAD  No Jaundice  Abd soft, obese  Inc CDI  Drain removed, site dressed    Recent Labs     07/06/21  0613 07/07/21 0342 07/08/21  0522   WBC 8.7 14.2* 7.2   RBC 4.29* 4.00* 4.06*   HEMOGLOBIN 13.0* 12.2* 12.1*   HEMATOCRIT 37.7* 35.2* 37.1*   MCV 87.9 88.0 91.4   MCH 30.3 30.5 29.8   RDW 42.5 44.2 47.4   PLATELETCT 150* 206 204   MPV 10.7 10.6 10.7   NEUTSPOLYS 87.80* 88.60* 70.10   LYMPHOCYTES 5.90* 4.80* 19.30*   MONOCYTES 5.60 5.90 9.40   EOSINOPHILS 0.00 0.00 0.10   BASOPHILS 0.10 0.10 0.10     Recent Labs     07/06/21  0613 07/07/21 0342 07/08/21  0522   ASTSGOT 42 51* 69*   ALTSGPT 96* 87* 80*   TBILIRUBIN 2.1* 1.1 0.8   ALKPHOSPHAT 214* 181* 158*   GLOBULIN 2.8 2.7 2.4     A/P  S/P Cholecystectomy, doing well  No further concerns from surgical POV    Need to remain while bacteremia managed  OK to DC when medically cleared.    Follow up in 2 weeks in my office    "

## 2021-07-09 VITALS
BODY MASS INDEX: 29.76 KG/M2 | DIASTOLIC BLOOD PRESSURE: 84 MMHG | WEIGHT: 207.89 LBS | RESPIRATION RATE: 18 BRPM | TEMPERATURE: 98.1 F | SYSTOLIC BLOOD PRESSURE: 145 MMHG | OXYGEN SATURATION: 96 % | HEART RATE: 84 BPM | HEIGHT: 70 IN

## 2021-07-09 LAB
ALBUMIN SERPL BCP-MCNC: 3.4 G/DL (ref 3.2–4.9)
ALBUMIN/GLOB SERPL: 1.2 G/DL
ALP SERPL-CCNC: 183 U/L (ref 30–99)
ALT SERPL-CCNC: 91 U/L (ref 2–50)
ANION GAP SERPL CALC-SCNC: 8 MMOL/L (ref 7–16)
AST SERPL-CCNC: 73 U/L (ref 12–45)
BASOPHILS # BLD AUTO: 0.3 % (ref 0–1.8)
BASOPHILS # BLD: 0.02 K/UL (ref 0–0.12)
BILIRUB SERPL-MCNC: 1.1 MG/DL (ref 0.1–1.5)
BUN SERPL-MCNC: 9 MG/DL (ref 8–22)
CALCIUM SERPL-MCNC: 8.6 MG/DL (ref 8.5–10.5)
CHLORIDE SERPL-SCNC: 99 MMOL/L (ref 96–112)
CO2 SERPL-SCNC: 26 MMOL/L (ref 20–33)
CREAT SERPL-MCNC: 0.77 MG/DL (ref 0.5–1.4)
EOSINOPHIL # BLD AUTO: 0.07 K/UL (ref 0–0.51)
EOSINOPHIL NFR BLD: 0.9 % (ref 0–6.9)
ERYTHROCYTE [DISTWIDTH] IN BLOOD BY AUTOMATED COUNT: 44.2 FL (ref 35.9–50)
GLOBULIN SER CALC-MCNC: 2.8 G/DL (ref 1.9–3.5)
GLUCOSE SERPL-MCNC: 121 MG/DL (ref 65–99)
HCT VFR BLD AUTO: 40.3 % (ref 42–52)
HGB BLD-MCNC: 13.8 G/DL (ref 14–18)
IMM GRANULOCYTES # BLD AUTO: 0.12 K/UL (ref 0–0.11)
IMM GRANULOCYTES NFR BLD AUTO: 1.5 % (ref 0–0.9)
LYMPHOCYTES # BLD AUTO: 1.42 K/UL (ref 1–4.8)
LYMPHOCYTES NFR BLD: 18 % (ref 22–41)
MCH RBC QN AUTO: 30.4 PG (ref 27–33)
MCHC RBC AUTO-ENTMCNC: 34.2 G/DL (ref 33.7–35.3)
MCV RBC AUTO: 88.8 FL (ref 81.4–97.8)
MONOCYTES # BLD AUTO: 0.68 K/UL (ref 0–0.85)
MONOCYTES NFR BLD AUTO: 8.6 % (ref 0–13.4)
NEUTROPHILS # BLD AUTO: 5.57 K/UL (ref 1.82–7.42)
NEUTROPHILS NFR BLD: 70.7 % (ref 44–72)
NRBC # BLD AUTO: 0 K/UL
NRBC BLD-RTO: 0 /100 WBC
PLATELET # BLD AUTO: 212 K/UL (ref 164–446)
PMV BLD AUTO: 10.1 FL (ref 9–12.9)
POTASSIUM SERPL-SCNC: 3.5 MMOL/L (ref 3.6–5.5)
PROT SERPL-MCNC: 6.2 G/DL (ref 6–8.2)
RBC # BLD AUTO: 4.54 M/UL (ref 4.7–6.1)
SODIUM SERPL-SCNC: 133 MMOL/L (ref 135–145)
WBC # BLD AUTO: 7.9 K/UL (ref 4.8–10.8)

## 2021-07-09 PROCEDURE — A9270 NON-COVERED ITEM OR SERVICE: HCPCS | Performed by: FAMILY MEDICINE

## 2021-07-09 PROCEDURE — A9270 NON-COVERED ITEM OR SERVICE: HCPCS | Performed by: STUDENT IN AN ORGANIZED HEALTH CARE EDUCATION/TRAINING PROGRAM

## 2021-07-09 PROCEDURE — 80053 COMPREHEN METABOLIC PANEL: CPT

## 2021-07-09 PROCEDURE — 700102 HCHG RX REV CODE 250 W/ 637 OVERRIDE(OP): Performed by: STUDENT IN AN ORGANIZED HEALTH CARE EDUCATION/TRAINING PROGRAM

## 2021-07-09 PROCEDURE — 700111 HCHG RX REV CODE 636 W/ 250 OVERRIDE (IP): Performed by: FAMILY MEDICINE

## 2021-07-09 PROCEDURE — 36415 COLL VENOUS BLD VENIPUNCTURE: CPT

## 2021-07-09 PROCEDURE — A9270 NON-COVERED ITEM OR SERVICE: HCPCS | Performed by: SURGERY

## 2021-07-09 PROCEDURE — 700102 HCHG RX REV CODE 250 W/ 637 OVERRIDE(OP): Performed by: FAMILY MEDICINE

## 2021-07-09 PROCEDURE — 700102 HCHG RX REV CODE 250 W/ 637 OVERRIDE(OP): Performed by: SURGERY

## 2021-07-09 PROCEDURE — 85025 COMPLETE CBC W/AUTO DIFF WBC: CPT

## 2021-07-09 PROCEDURE — 99239 HOSP IP/OBS DSCHRG MGMT >30: CPT | Performed by: FAMILY MEDICINE

## 2021-07-09 RX ORDER — POTASSIUM CHLORIDE 20 MEQ/1
20 TABLET, EXTENDED RELEASE ORAL DAILY
Qty: 5 TABLET | Refills: 0 | Status: SHIPPED | OUTPATIENT
Start: 2021-07-10 | End: 2021-07-09 | Stop reason: SDUPTHER

## 2021-07-09 RX ORDER — POTASSIUM CHLORIDE 20 MEQ/1
20 TABLET, EXTENDED RELEASE ORAL DAILY
Status: DISCONTINUED | OUTPATIENT
Start: 2021-07-09 | End: 2021-07-09 | Stop reason: HOSPADM

## 2021-07-09 RX ORDER — OXYCODONE HYDROCHLORIDE 10 MG/1
10 TABLET ORAL EVERY 6 HOURS PRN
Qty: 20 TABLET | Refills: 0 | Status: SHIPPED | OUTPATIENT
Start: 2021-07-09 | End: 2021-07-09 | Stop reason: SDUPTHER

## 2021-07-09 RX ORDER — CEFDINIR 300 MG/1
300 CAPSULE ORAL 2 TIMES DAILY
Qty: 16 CAPSULE | Refills: 0 | Status: SHIPPED | OUTPATIENT
Start: 2021-07-09 | End: 2021-07-17

## 2021-07-09 RX ORDER — POTASSIUM CHLORIDE 20 MEQ/1
20 TABLET, EXTENDED RELEASE ORAL DAILY
Qty: 5 TABLET | Refills: 0 | Status: SHIPPED | OUTPATIENT
Start: 2021-07-10 | End: 2021-07-15

## 2021-07-09 RX ORDER — CEFDINIR 300 MG/1
300 CAPSULE ORAL 2 TIMES DAILY
Qty: 16 CAPSULE | Refills: 0 | Status: SHIPPED | OUTPATIENT
Start: 2021-07-09 | End: 2021-07-09 | Stop reason: SDUPTHER

## 2021-07-09 RX ORDER — ONDANSETRON 4 MG/1
4 TABLET, ORALLY DISINTEGRATING ORAL EVERY 6 HOURS PRN
Qty: 30 TABLET | Refills: 0 | Status: SHIPPED | OUTPATIENT
Start: 2021-07-09 | End: 2021-07-09 | Stop reason: SDUPTHER

## 2021-07-09 RX ORDER — ONDANSETRON 4 MG/1
4 TABLET, ORALLY DISINTEGRATING ORAL EVERY 6 HOURS PRN
Qty: 30 TABLET | Refills: 0 | Status: SHIPPED
Start: 2021-07-09 | End: 2021-08-17

## 2021-07-09 RX ORDER — OXYCODONE HYDROCHLORIDE 10 MG/1
10 TABLET ORAL EVERY 6 HOURS PRN
Qty: 20 TABLET | Refills: 0 | Status: SHIPPED | OUTPATIENT
Start: 2021-07-09 | End: 2021-07-14

## 2021-07-09 RX ADMIN — DOCUSATE SODIUM 50 MG AND SENNOSIDES 8.6 MG 2 TABLET: 8.6; 5 TABLET, FILM COATED ORAL at 05:16

## 2021-07-09 RX ADMIN — LISINOPRIL 2.5 MG: 5 TABLET ORAL at 05:16

## 2021-07-09 RX ADMIN — OMEPRAZOLE 40 MG: 20 CAPSULE, DELAYED RELEASE ORAL at 05:16

## 2021-07-09 RX ADMIN — METRONIDAZOLE 500 MG: 500 TABLET ORAL at 05:16

## 2021-07-09 RX ADMIN — CEFTRIAXONE SODIUM 2 G: 10 INJECTION, POWDER, FOR SOLUTION INTRAVENOUS at 05:15

## 2021-07-09 RX ADMIN — AMLODIPINE BESYLATE 10 MG: 10 TABLET ORAL at 05:16

## 2021-07-09 RX ADMIN — POTASSIUM CHLORIDE 20 MEQ: 1500 TABLET, EXTENDED RELEASE ORAL at 07:43

## 2021-07-09 RX ADMIN — OXYCODONE HYDROCHLORIDE 10 MG: 10 TABLET ORAL at 05:16

## 2021-07-09 RX ADMIN — CARVEDILOL 6.25 MG: 6.25 TABLET, FILM COATED ORAL at 07:43

## 2021-07-09 ASSESSMENT — PAIN DESCRIPTION - PAIN TYPE
TYPE: ACUTE PAIN
TYPE: ACUTE PAIN

## 2021-07-09 NOTE — CARE PLAN
The patient is Stable - Low risk of patient condition declining or worsening    Shift Goals  Clinical Goals: Pain Management   Patient Goals: Rest     Progress made toward(s) clinical / shift goals:        Problem: Knowledge Deficit - Standard  Goal: Patient and family/care givers will demonstrate understanding of plan of care, disease process/condition, diagnostic tests and medications  Outcome: Progressing  Note: All questions and concerns addressed with patient, patient verbalizes understanding of current plan of care.      Problem: Pain - Standard  Goal: Alleviation of pain or a reduction in pain to the patient’s comfort goal  Outcome: Progressing  Note: Patient's pain managed with prescribed medications and nonpharmalogical pain interventions. Education provided to patient about pain rating scale, prescribed medications and nonpharmalogical pain interventions. Patient verbalizes understanding of education. Patient able to rest and sleep comfortably throughout the night with effective pain management.

## 2021-07-09 NOTE — DISCHARGE SUMMARY
Discharge Summary    CHIEF COMPLAINT ON ADMISSION  No chief complaint on file.      Reason for Admission  abdominal pain     Admission Date  7/4/2021    CODE STATUS  Prior    HPI & HOSPITAL COURSE  This is a 76 y.o. male here with Sepsis secondary to Choledocholithiasis with Cholangitis. He was started on empiric coverage with IV Rocephin and Flagyl. Gastroenterology was consulted on the case, patient underwent ERCP with sphincterotomy and balloon sweep with removal of the bile duct stone on 7/5/2021. His blood cultures also showed gram-negative bacteremia. Surgery was consulted on the case, patient underwent Laparoscopic cholecystectomy on 7/6/2021.  Patient tolerated the procedure well.  His repeat blood cultures were negative.  Surgery has cleared him for discharge.    Therefore, he is discharged in good and stable condition to home with close outpatient follow-up.    The patient met 2-midnight criteria for an inpatient stay at the time of discharge.    Discharge Date  7/9/2021    FOLLOW UP ITEMS POST DISCHARGE  Follow-up with Surgery    DISCHARGE DIAGNOSES  Principal Problem:    Calculus of bile duct with cholangitis POA: Yes  Active Problems:    Sepsis (HCC) POA: Yes    Gram-negative bacteremia POA: Yes    COPD (chronic obstructive pulmonary disease) (HCC) POA: Yes    HTN (hypertension) POA: Yes  Resolved Problems:    * No resolved hospital problems. *      FOLLOW UP  No future appointments.  Jacky Huang D.O.  [address] [facility] [address]  [place] [address]  [phone]    Schedule an appointment as soon as possible for a visit in 2 weeks  For suture removal, For wound re-check      MEDICATIONS ON DISCHARGE     Medication List      START taking these medications      Instructions   cefdinir 300 MG Caps  Commonly known as: OMNICEF   Take 1 capsule by mouth 2 times a day for 8 days.  Dose: 300 mg     ondansetron 4 MG Tbdp  Commonly known as: ZOFRAN ODT   Take 1 tablet by mouth every 6 hours as needed for  Nausea.  Dose: 4 mg     oxyCODONE immediate release 10 MG immediate release tablet  Commonly known as: ROXICODONE   Take 1 tablet by mouth every 6 hours as needed for Moderate Pain or Severe Pain for up to 5 days.  Dose: 10 mg     potassium chloride SA 20 MEQ Tbcr  Start taking on: July 10, 2021  Commonly known as: Kdur   Take 1 tablet by mouth every day for 5 days.  Dose: 20 mEq        CONTINUE taking these medications      Instructions   amLODIPine 10 MG Tabs  Commonly known as: NORVASC   Take 10 mg by mouth every day.  Dose: 10 mg     budesonide-formoterol 160-4.5 MCG/ACT Aero  Commonly known as: SYMBICORT   Inhale 2 Puffs 2 times a day.  Dose: 2 Puff     carvedilol 6.25 MG Tabs  Commonly known as: COREG   Take 6.25 mg by mouth 2 times a day, with meals.  Dose: 6.25 mg     LISINOPRIL PO   Take  by mouth.     omeprazole 20 MG delayed-release capsule  Commonly known as: PRILOSEC   Take 40 mg by mouth 2 times a day.  Dose: 40 mg     tiotropium 18 MCG Caps  Commonly known as: SPIRIVA   Place 18 mcg into inhaler and inhale every day.  Dose: 18 mcg            Allergies  No Known Allergies    DIET  No orders of the defined types were placed in this encounter.      ACTIVITY  Light duty.  Weight bearing as tolerated    CONSULTATIONS  Surgery - [facility]    PROCEDURES  ERCP sphincterotomy balloon sweep with removal of the bile duct stone. 7/5/2021    Laparoscopic cholecystectomy 7/6/2021    LABORATORY  Lab Results   Component Value Date    SODIUM 133 (L) 07/09/2021    POTASSIUM 3.5 (L) 07/09/2021    CHLORIDE 99 07/09/2021    CO2 26 07/09/2021    GLUCOSE 121 (H) 07/09/2021    BUN 9 07/09/2021    CREATININE 0.77 07/09/2021        Lab Results   Component Value Date    WBC 7.9 07/09/2021    HEMOGLOBIN 13.8 (L) 07/09/2021    HEMATOCRIT 40.3 (L) 07/09/2021    PLATELETCT 212 07/09/2021        Total time of the discharge process exceeds 35 minutes.

## 2021-07-09 NOTE — PROGRESS NOTES
Discharging Patient home per physician order.  Discharged with family.  Demonstrated understanding of discharge instructions, follow up appointments, home medications, prescriptions, home care for surgical wound and nursing care instructions for pain and wound care.  Ambulating independently, voiding without difficulty, pain well controlled, tolerating oral medications, oxygen saturation greater than 90%, tolerating diet.  Educational handouts incision care and pain medication given and discussed.  Verbalized understanding of discharge instructions and educational handouts.  All questions answered.  Belongings with patient at time of discharge.

## 2021-07-09 NOTE — PROGRESS NOTES
Bedside report received. Assessment completed.  Pt is A&O x4. Pt on room ai.   Medicating for pain PRN per MAR Pain 2/10  Denies nausea, - numbness, - tingling.  Skin 3 lap sites with dermabond.        LDA CDI   Last BM 7/9/21, distended, +flatus, +void.  Regular diet. Tolerates well.   Pt up independently.  Call light and belongings within reach. All needs met at this time. Fall Precautions and hourly rounding in place.

## 2021-07-09 NOTE — DISCHARGE INSTRUCTIONS
Discharge Instructions    Discharged to home by car with relative. Discharged via wheelchair, hospital escort: Yes.  Special equipment needed: Not Applicable    Be sure to schedule a follow-up appointment with your primary care doctor or any specialists as instructed.     Discharge Plan:   Diet Plan: Discussed  Activity Level: Discussed  Confirmed Follow up Appointment: Patient to Call and Schedule Appointment  Confirmed Symptoms Management: Discussed  Medication Reconciliation Updated: Yes    I understand that a diet low in cholesterol, fat, and sodium is recommended for good health. Unless I have been given specific instructions below for another diet, I accept this instruction as my diet prescription.   Other diet: Regular    Special Instructions: None    · Is patient discharged on Warfarin / Coumadin?   No       Incision Care, Adult  An incision is a cut that a doctor makes in your skin for surgery (for a procedure). Most times, these cuts are closed after surgery. Your cut from surgery may be closed with stitches (sutures), staples, skin glue, or skin tape (adhesive strips). You may need to return to your doctor to have stitches or staples taken out. This may happen many days or many weeks after your surgery. The cut needs to be well cared for so it does not get infected.  How to care for your cut  Cut care    · Follow instructions from your doctor about how to take care of your cut. Make sure you:  ? Wash your hands with soap and water before you change your bandage (dressing). If you cannot use soap and water, use hand .  ? Change your bandage as told by your doctor.  ? Leave stitches, skin glue, or skin tape in place. They may need to stay in place for 2 weeks or longer. If tape strips get loose and curl up, you may trim the loose edges. Do not remove tape strips completely unless your doctor says it is okay.  · Check your cut area every day for signs of infection. Check for:  ? More redness,  swelling, or pain.  ? More fluid or blood.  ? Warmth.  ? Pus or a bad smell.  · Ask your doctor how to clean the cut. This may include:  ? Using mild soap and water.  ? Using a clean towel to pat the cut dry after you clean it.  ? Putting a cream or ointment on the cut. Do this only as told by your doctor.  ? Covering the cut with a clean bandage.  · Ask your doctor when you can leave the cut uncovered.  · Do not take baths, swim, or use a hot tub until your doctor says it is okay. Ask your doctor if you can take showers. You may only be allowed to take sponge baths for bathing.  Medicines  · If you were prescribed an antibiotic medicine, cream, or ointment, take the antibiotic or put it on the cut as told by your doctor. Do not stop taking or putting on the antibiotic even if your condition gets better.  · Take over-the-counter and prescription medicines only as told by your doctor.  General instructions  · Limit movement around your cut. This helps healing.  ? Avoid straining, lifting, or exercise for the first month, or for as long as told by your doctor.  ? Follow instructions from your doctor about going back to your normal activities.  ? Ask your doctor what activities are safe.  · Protect your cut from the sun when you are outside for the first 6 months, or for as long as told by your doctor. Put on sunscreen around the scar or cover up the scar.  · Keep all follow-up visits as told by your doctor. This is important.  Contact a doctor if:  · Your have more redness, swelling, or pain around the cut.  · You have more fluid or blood coming from the cut.  · Your cut feels warm to the touch.  · You have pus or a bad smell coming from the cut.  · You have a fever or shaking chills.  · You feel sick to your stomach (nauseous) or you throw up (vomit).  · You are dizzy.  · Your stitches or staples come undone.  Get help right away if:  · You have a red streak coming from your cut.  · Your cut bleeds through the  bandage and the bleeding does not stop with gentle pressure.  · The edges of your cut open up and separate.  · You have very bad (severe) pain.  · You have a rash.  · You are confused.  · You pass out (faint).  · You have trouble breathing and you have a fast heartbeat.  This information is not intended to replace advice given to you by your health care provider. Make sure you discuss any questions you have with your health care provider.  Document Released: 03/11/2013 Document Revised: 05/07/2018 Document Reviewed: 08/25/2017  [employer] Patient Education © 2020 [employer]      Depression / Suicide Risk    As you are discharged from this [facility] facility, it is important to learn how to keep safe from harming yourself.    Recognize the warning signs:  · Abrupt changes in personality, positive or negative- including increase in energy   · Giving away possessions  · Change in eating patterns- significant weight changes-  positive or negative  · Change in sleeping patterns- unable to sleep or sleeping all the time   · Unwillingness or inability to communicate  · Depression  · Unusual sadness, discouragement and loneliness  · Talk of wanting to die  · Neglect of personal appearance   · Rebelliousness- reckless behavior  · Withdrawal from people/activities they love  · Confusion- inability to concentrate     If you or a loved one observes any of these behaviors or has concerns about self-harm, here's what you can do:  · Talk about it- your feelings and reasons for harming yourself  · Remove any means that you might use to hurt yourself (examples: pills, rope, extension cords, firearm)  · Get professional help from the community (Mental Health, Substance Abuse, psychological counseling)  · Do not be alone:Call your Safe Contact- someone whom you trust who will be there for you.  · Call your local CRISIS HOTLINE 615-7012 or 907-893-3909  · Call your local Children's Mobile Crisis Response Team [facility] (915)  198-3318 or www.[employer]  · Call the toll free National Suicide Prevention Hotlines   · National Suicide Prevention Lifeline 183-035-FCCJ (3648)  · National [employer] Line Network 800-SUICIDE (639-5452)

## 2021-07-09 NOTE — PROGRESS NOTES
Pt A&Ox4  Patient answers all questions appropriately and demonstrates proper use of call light     Patient rates pain as 8/10 upon assessment, medicated per MAR     Tolerating diet, denies n/v. +bowel sounds, +flatus, LBM 7/8  +void     Saturating >90% on room air   Patient denies SOB     x3 Lap sites to abdomen   1 previous MARÍA ELENA site dressing in place, Clean/dry/intact     Pt ambulates independently with a steady gait   Patient verbalizes understanding to call when needing assistance     Updated on plan of care. Safety education provided. Bed locked in low. Call light within reach. Rounding in place.

## 2021-07-09 NOTE — CARE PLAN
Problem: Knowledge Deficit - Standard  Goal: Patient and family/care givers will demonstrate understanding of plan of care, disease process/condition, diagnostic tests and medications  Outcome: Progressing     Problem: Pain - Standard  Goal: Alleviation of pain or a reduction in pain to the patient’s comfort goal  Outcome: Progressing     Problem: Fall Risk  Goal: Patient will remain free from falls  Outcome: Progressing     The patient is Stable - Low risk of patient condition declining or worsening    Shift Goals  Clinical Goals: Pain Management   Patient Goals: Rest

## 2021-07-12 LAB
BACTERIA BLD CULT: NORMAL
BACTERIA BLD CULT: NORMAL
SIGNIFICANT IND 70042: NORMAL
SIGNIFICANT IND 70042: NORMAL
SITE SITE: NORMAL
SITE SITE: NORMAL
SOURCE SOURCE: NORMAL
SOURCE SOURCE: NORMAL

## 2021-07-14 NOTE — DOCUMENTATION QUERY
[facility]                                                                       Query Response Note      PATIENT:               SHIRA BRAN  ACCT #:                  [id number]  MRN:                     [id number]  :                      1945  ADMIT DATE:       2021 2:08 AM  DISCH DATE:        2021 10:11 AM  RESPONDING  PROVIDER #:        [id number]           QUERY TEXT:    The diagnosis of sepsis has been documented in the H&P.  Tachycardia and tachypnea are documented as +SIRS criteria. However, the criteria identified in H&P are unable to be validated in the vitals flowsheet within Epic. Please provide clinical indicators/SIRS criteria supportive of the documented diagnosis of sepsis. Please see reference below.     Note: If an appropriate response is not listed below, please respond with a new note.    Sepsis - real or suspected infection plus 2 or more SIRS criteria:  Temp <96.8 or >101  HR >90  RR >20  WBC <4,000 or >12,000  >10% bandemia    The patient's Clinical Indicators include:  Clinical indicators-  Per  H&P: Sepsis POA, source unknown. SIRS criteria include: HR >90 BPM and RR >20.  Per  EPIC flowsheet: T Max: 99.8 °F  Pulse: 70-90  Resp: 14-18   On admission: WBC 11.6;  Lactic acid 1.2-1.4;  Immature granulocytes (abs) 0.05    Treatments-  Sepsis protocol; Fluid resuscitation per protocol; Blood cultures; CBC; Rocephin and Flagyl    Risks-  Sepsis; Bacteremia; 2/2 BC + Klebsiella pneumoniae; Choledocholithiasis with cholangitis    Thank you,  Alize Lozano CDI RN  Connect via Voalte  Options provided:   -- Sepsis exists, (please document + SIRS criteria and clinical indicators here)   -- Sepsis does not exist - amended documentation in the medical record and updated problem list   -- Unable to provide additional clarity regarding the diagnosis   -- Unable to determine      Query created by: Alize Loazno  on 7/6/2021 12:06 PM    RESPONSE TEXT:    sepsis exists - I noted at bedside patient had HR of 96 and Respiratory Rate of 25.          Electronically signed by:  CEASAR ROJAS MD 7/14/2021 2:41 AM

## 2023-09-28 NOTE — ANESTHESIA QCDR
2019 [facility] Clinical Data Registry (for Quality Improvement)     Postoperative nausea/vomiting risk protocol (Adult = 18 yrs and Pediatric 3-17 yrs)- (430 and 463)  General inhalation anesthetic (NOT TIVA) with PONV risk factors: Yes  Provision of anti-emetic therapy with at least 2 different classes of agents: Yes   Patient DID NOT receive anti-emetic therapy and reason is documented in Medical Record:  N/A    Multimodal Pain Management- (AQI59)  Patient undergoing Elective Surgery (i.e. Outpatient, or ASC, or Prescheduled Surgery prior to Hospital Admission): Yes  Use of Multimodal Pain Management, two or more drugs and/or interventions, NOT including systemic opioids: Yes   Exception: Documented allergy to multiple classes of analgesics:  N/A    PACU assessment of acute postoperative pain prior to Anesthesia Care End- Applies to Patients Age = 18- (ABG7)  Initial PACU pain score is which of the following: < 7/10  Patient unable to report pain score: N/A    Post-anesthetic transfer of care checklist/protocol to PACU/ICU- (426 and 427)  Upon conclusion of case, patient transferred to which of the following locations: PACU/Non-ICU  Use of transfer checklist/protocol: Yes  Exclusion: Service Performed in Patient Hospital Room (and thus did not require transfer): N/A    PACU Reintubation- (AQI31)  General anesthesia requiring endotracheal intubation (ETT) along with subsequent extubation in OR or PACU: Yes  Required reintubation in the PACU: No   Extubation was a planned trial documented in the medical record prior to removal of the original airway device:  N/A    Unplanned admission to ICU related to anesthesia service up through end of PACU care- (MD51)  Unplanned admission to ICU (not initially anticipated at anesthesia start time): No          Detail Level: Detailed Show Aperture Variable?: Yes Consent: The patient's consent was obtained including but not limited to risks of crusting, scabbing, blistering, scarring, darker or lighter pigmentary change, recurrence, incomplete removal and infection. Number Of Freeze-Thaw Cycles: 1 freeze-thaw cycle Render Post-Care Instructions In Note?: no Duration Of Freeze Thaw-Cycle (Seconds): 3 Application Tool (Optional): Cry-AC Post-Care Instructions: I reviewed with the patient in detail post-care instructions. Patient is to wear sunprotection, and avoid picking at any of the treated lesions. Pt may apply Vaseline to crusted or scabbing areas. Spray Paint Text: The liquid nitrogen was applied to the skin utilizing a spray paint frosting technique. Medical Necessity Clause: This procedure was medically necessary because the lesions that were treated were: Medical Necessity Information: It is in your best interest to select a reason for this procedure from the list below. All of these items fulfill various CMS LCD requirements except the new and changing color options. Duration Of Freeze Thaw-Cycle (Seconds): 5-10

## 2024-08-23 ENCOUNTER — HOSPITAL ENCOUNTER (OUTPATIENT)
Facility: MEDICAL CENTER | Age: 79
End: 2024-08-23
Attending: NEUROLOGICAL SURGERY | Admitting: NEUROLOGICAL SURGERY
Payer: COMMERCIAL

## 2024-08-30 ENCOUNTER — APPOINTMENT (OUTPATIENT)
Dept: ADMISSIONS | Facility: MEDICAL CENTER | Age: 79
End: 2024-08-30
Attending: NEUROLOGICAL SURGERY
Payer: COMMERCIAL

## 2024-09-06 ENCOUNTER — PRE-ADMISSION TESTING (OUTPATIENT)
Dept: ADMISSIONS | Facility: MEDICAL CENTER | Age: 79
End: 2024-09-06
Attending: NEUROLOGICAL SURGERY
Payer: COMMERCIAL

## 2024-09-06 RX ORDER — TIOTROPIUM BROMIDE 18 UG/1
18 CAPSULE ORAL; RESPIRATORY (INHALATION) DAILY
COMMUNITY

## 2024-09-06 RX ORDER — ASPIRIN 81 MG/1
81 TABLET ORAL
Status: ON HOLD | COMMUNITY
End: 2024-09-23

## 2024-09-06 RX ORDER — FLUTICASONE PROPIONATE 44 UG/1
2 AEROSOL, METERED RESPIRATORY (INHALATION) 2 TIMES DAILY
COMMUNITY

## 2024-09-06 RX ORDER — ROSUVASTATIN CALCIUM 40 MG/1
40 TABLET, COATED ORAL
COMMUNITY

## 2024-09-06 RX ORDER — PANTOPRAZOLE SODIUM 40 MG/1
40 TABLET, DELAYED RELEASE ORAL 2 TIMES DAILY
COMMUNITY

## 2024-09-11 RX ORDER — ONDANSETRON 2 MG/ML
4 INJECTION INTRAMUSCULAR; INTRAVENOUS
Status: CANCELLED | OUTPATIENT
Start: 2024-09-23

## 2024-09-11 RX ORDER — METOPROLOL TARTRATE 1 MG/ML
1 INJECTION, SOLUTION INTRAVENOUS
Status: CANCELLED | OUTPATIENT
Start: 2024-09-23

## 2024-09-11 RX ORDER — HYDROMORPHONE HYDROCHLORIDE 1 MG/ML
0.1 INJECTION, SOLUTION INTRAMUSCULAR; INTRAVENOUS; SUBCUTANEOUS
Status: CANCELLED | OUTPATIENT
Start: 2024-09-23

## 2024-09-11 RX ORDER — HALOPERIDOL 5 MG/ML
1 INJECTION INTRAMUSCULAR
Status: CANCELLED | OUTPATIENT
Start: 2024-09-23

## 2024-09-11 RX ORDER — HYDROMORPHONE HYDROCHLORIDE 1 MG/ML
0.4 INJECTION, SOLUTION INTRAMUSCULAR; INTRAVENOUS; SUBCUTANEOUS
Status: CANCELLED | OUTPATIENT
Start: 2024-09-23

## 2024-09-11 RX ORDER — ALBUTEROL SULFATE 5 MG/ML
2.5 SOLUTION RESPIRATORY (INHALATION)
Status: CANCELLED | OUTPATIENT
Start: 2024-09-23

## 2024-09-11 RX ORDER — DIPHENHYDRAMINE HYDROCHLORIDE 50 MG/ML
12.5 INJECTION INTRAMUSCULAR; INTRAVENOUS
Status: CANCELLED | OUTPATIENT
Start: 2024-09-23

## 2024-09-11 RX ORDER — OXYCODONE HCL 5 MG/5 ML
5 SOLUTION, ORAL ORAL
Status: CANCELLED | OUTPATIENT
Start: 2024-09-23

## 2024-09-11 RX ORDER — HYDROMORPHONE HYDROCHLORIDE 1 MG/ML
0.2 INJECTION, SOLUTION INTRAMUSCULAR; INTRAVENOUS; SUBCUTANEOUS
Status: CANCELLED | OUTPATIENT
Start: 2024-09-23

## 2024-09-11 RX ORDER — HYDRALAZINE HYDROCHLORIDE 20 MG/ML
5 INJECTION INTRAMUSCULAR; INTRAVENOUS
Status: CANCELLED | OUTPATIENT
Start: 2024-09-23

## 2024-09-11 RX ORDER — EPHEDRINE SULFATE 50 MG/ML
5 INJECTION, SOLUTION INTRAVENOUS
Status: CANCELLED | OUTPATIENT
Start: 2024-09-23

## 2024-09-11 RX ORDER — OXYCODONE HCL 5 MG/5 ML
10 SOLUTION, ORAL ORAL
Status: CANCELLED | OUTPATIENT
Start: 2024-09-23

## 2024-09-11 RX ORDER — SODIUM CHLORIDE, SODIUM LACTATE, POTASSIUM CHLORIDE, CALCIUM CHLORIDE 600; 310; 30; 20 MG/100ML; MG/100ML; MG/100ML; MG/100ML
INJECTION, SOLUTION INTRAVENOUS CONTINUOUS
Status: CANCELLED | OUTPATIENT
Start: 2024-09-23

## 2024-09-11 RX ORDER — OXYCODONE HCL 10 MG/1
10 TABLET, FILM COATED, EXTENDED RELEASE ORAL ONCE
Status: CANCELLED | OUTPATIENT
Start: 2024-09-23 | End: 2024-09-23

## 2024-09-11 RX ORDER — ACETAMINOPHEN 500 MG
1000 TABLET ORAL ONCE
Status: CANCELLED | OUTPATIENT
Start: 2024-09-23 | End: 2024-09-23

## 2024-09-11 RX ORDER — LABETALOL HYDROCHLORIDE 5 MG/ML
5 INJECTION, SOLUTION INTRAVENOUS
Status: CANCELLED | OUTPATIENT
Start: 2024-09-23

## 2024-09-23 ENCOUNTER — APPOINTMENT (OUTPATIENT)
Dept: RADIOLOGY | Facility: MEDICAL CENTER | Age: 79
End: 2024-09-23
Attending: NEUROLOGICAL SURGERY
Payer: COMMERCIAL

## 2024-09-23 ENCOUNTER — ANESTHESIA (OUTPATIENT)
Dept: SURGERY | Facility: MEDICAL CENTER | Age: 79
End: 2024-09-23
Payer: COMMERCIAL

## 2024-09-23 ENCOUNTER — ANESTHESIA EVENT (OUTPATIENT)
Dept: SURGERY | Facility: MEDICAL CENTER | Age: 79
End: 2024-09-23
Payer: COMMERCIAL

## 2024-09-23 ENCOUNTER — HOSPITAL ENCOUNTER (INPATIENT)
Facility: MEDICAL CENTER | Age: 79
LOS: 5 days | End: 2024-09-28
Attending: NEUROLOGICAL SURGERY | Admitting: NEUROLOGICAL SURGERY
Payer: COMMERCIAL

## 2024-09-23 DIAGNOSIS — M47.12 CERVICAL SPONDYLOSIS WITH MYELOPATHY: ICD-10-CM

## 2024-09-23 LAB
GLUCOSE BLD STRIP.AUTO-MCNC: 118 MG/DL (ref 65–99)
GLUCOSE BLD STRIP.AUTO-MCNC: 144 MG/DL (ref 65–99)
GLUCOSE BLD STRIP.AUTO-MCNC: 176 MG/DL (ref 65–99)

## 2024-09-23 PROCEDURE — 00QT0ZZ REPAIR SPINAL MENINGES, OPEN APPROACH: ICD-10-PCS | Performed by: NEUROLOGICAL SURGERY

## 2024-09-23 PROCEDURE — 700105 HCHG RX REV CODE 258: Performed by: ANESTHESIOLOGY

## 2024-09-23 PROCEDURE — A9270 NON-COVERED ITEM OR SERVICE: HCPCS | Performed by: NURSE PRACTITIONER

## 2024-09-23 PROCEDURE — 700111 HCHG RX REV CODE 636 W/ 250 OVERRIDE (IP): Mod: JZ | Performed by: ANESTHESIOLOGY

## 2024-09-23 PROCEDURE — 8E09XBZ COMPUTER ASSISTED PROCEDURE OF HEAD AND NECK REGION: ICD-10-PCS | Performed by: NEUROLOGICAL SURGERY

## 2024-09-23 PROCEDURE — 0RG2071 FUSION OF 2 OR MORE CERVICAL VERTEBRAL JOINTS WITH AUTOLOGOUS TISSUE SUBSTITUTE, POSTERIOR APPROACH, POSTERIOR COLUMN, OPEN APPROACH: ICD-10-PCS | Performed by: NEUROLOGICAL SURGERY

## 2024-09-23 PROCEDURE — 700102 HCHG RX REV CODE 250 W/ 637 OVERRIDE(OP): Performed by: NEUROLOGICAL SURGERY

## 2024-09-23 PROCEDURE — 95938 SOMATOSENSORY TESTING: CPT | Performed by: NEUROLOGICAL SURGERY

## 2024-09-23 PROCEDURE — 95861 NEEDLE EMG 2 EXTREMITIES: CPT | Performed by: NEUROLOGICAL SURGERY

## 2024-09-23 PROCEDURE — 700102 HCHG RX REV CODE 250 W/ 637 OVERRIDE(OP): Performed by: NURSE PRACTITIONER

## 2024-09-23 PROCEDURE — 700111 HCHG RX REV CODE 636 W/ 250 OVERRIDE (IP): Performed by: NEUROLOGICAL SURGERY

## 2024-09-23 PROCEDURE — A9270 NON-COVERED ITEM OR SERVICE: HCPCS | Performed by: NEUROLOGICAL SURGERY

## 2024-09-23 PROCEDURE — 95937 NEUROMUSCULAR JUNCTION TEST: CPT | Performed by: NEUROLOGICAL SURGERY

## 2024-09-23 PROCEDURE — 700111 HCHG RX REV CODE 636 W/ 250 OVERRIDE (IP): Mod: JZ | Performed by: NURSE PRACTITIONER

## 2024-09-23 PROCEDURE — 700101 HCHG RX REV CODE 250: Performed by: ANESTHESIOLOGY

## 2024-09-23 PROCEDURE — 502000 HCHG MISC OR IMPLANTS RC 0278: Performed by: NEUROLOGICAL SURGERY

## 2024-09-23 PROCEDURE — 160042 HCHG SURGERY MINUTES - EA ADDL 1 MIN LEVEL 5: Performed by: NEUROLOGICAL SURGERY

## 2024-09-23 PROCEDURE — 160031 HCHG SURGERY MINUTES - 1ST 30 MINS LEVEL 5: Performed by: NEUROLOGICAL SURGERY

## 2024-09-23 PROCEDURE — C1713 ANCHOR/SCREW BN/BN,TIS/BN: HCPCS | Performed by: NEUROLOGICAL SURGERY

## 2024-09-23 PROCEDURE — 700111 HCHG RX REV CODE 636 W/ 250 OVERRIDE (IP): Performed by: ANESTHESIOLOGY

## 2024-09-23 PROCEDURE — 95940 IONM IN OPERATNG ROOM 15 MIN: CPT | Performed by: NEUROLOGICAL SURGERY

## 2024-09-23 PROCEDURE — 110454 HCHG SHELL REV 250: Performed by: NEUROLOGICAL SURGERY

## 2024-09-23 PROCEDURE — 110371 HCHG SHELL REV 272: Performed by: NEUROLOGICAL SURGERY

## 2024-09-23 PROCEDURE — 700101 HCHG RX REV CODE 250: Performed by: NURSE PRACTITIONER

## 2024-09-23 PROCEDURE — 700105 HCHG RX REV CODE 258: Performed by: NEUROLOGICAL SURGERY

## 2024-09-23 PROCEDURE — 95939 C MOTOR EVOKED UPR&LWR LIMBS: CPT | Performed by: NEUROLOGICAL SURGERY

## 2024-09-23 PROCEDURE — 160048 HCHG OR STATISTICAL LEVEL 1-5: Performed by: NEUROLOGICAL SURGERY

## 2024-09-23 PROCEDURE — 770001 HCHG ROOM/CARE - MED/SURG/GYN PRIV*

## 2024-09-23 PROCEDURE — 82962 GLUCOSE BLOOD TEST: CPT | Mod: 91

## 2024-09-23 PROCEDURE — 95868 NDL EMG CRANIAL NRV MUSC BI: CPT | Performed by: NEUROLOGICAL SURGERY

## 2024-09-23 PROCEDURE — 4A11X4G MONITORING OF PERIPHERAL NERVOUS ELECTRICAL ACTIVITY, INTRAOPERATIVE, EXTERNAL APPROACH: ICD-10-PCS | Performed by: NEUROLOGICAL SURGERY

## 2024-09-23 PROCEDURE — 700101 HCHG RX REV CODE 250: Performed by: NEUROLOGICAL SURGERY

## 2024-09-23 PROCEDURE — 160002 HCHG RECOVERY MINUTES (STAT): Performed by: NEUROLOGICAL SURGERY

## 2024-09-23 PROCEDURE — 160009 HCHG ANES TIME/MIN: Performed by: NEUROLOGICAL SURGERY

## 2024-09-23 PROCEDURE — 95955 EEG DURING SURGERY: CPT | Performed by: NEUROLOGICAL SURGERY

## 2024-09-23 PROCEDURE — 700105 HCHG RX REV CODE 258: Performed by: NURSE PRACTITIONER

## 2024-09-23 PROCEDURE — 160035 HCHG PACU - 1ST 60 MINS PHASE I: Performed by: NEUROLOGICAL SURGERY

## 2024-09-23 PROCEDURE — 72040 X-RAY EXAM NECK SPINE 2-3 VW: CPT

## 2024-09-23 DEVICE — GRAFT DURAMATRIX ONLAY PLUS 1IN X 1IN OR 2.7CM X 2.75CM: Type: IMPLANTABLE DEVICE | Site: EPIDURAL SPACE | Status: FUNCTIONAL

## 2024-09-23 DEVICE — DURASEAL SEALANT SYSTEM 5ML - (5/BX): Type: IMPLANTABLE DEVICE | Site: EPIDURAL SPACE | Status: FUNCTIONAL

## 2024-09-23 DEVICE — GRAFT BONE PASTE GRAFTON PLUS 5CC (1EA): Type: IMPLANTABLE DEVICE | Site: EPIDURAL SPACE | Status: FUNCTIONAL

## 2024-09-23 DEVICE — IMPLANTABLE DEVICE: Type: IMPLANTABLE DEVICE | Site: EPIDURAL SPACE | Status: FUNCTIONAL

## 2024-09-23 DEVICE — SCREW SET INFINITY (1EA): Type: IMPLANTABLE DEVICE | Site: EPIDURAL SPACE | Status: FUNCTIONAL

## 2024-09-23 RX ORDER — SODIUM CHLORIDE, SODIUM LACTATE, POTASSIUM CHLORIDE, CALCIUM CHLORIDE 600; 310; 30; 20 MG/100ML; MG/100ML; MG/100ML; MG/100ML
INJECTION, SOLUTION INTRAVENOUS
Status: DISCONTINUED | OUTPATIENT
Start: 2024-09-23 | End: 2024-09-23 | Stop reason: SURG

## 2024-09-23 RX ORDER — OXYCODONE HCL 5 MG/5 ML
10 SOLUTION, ORAL ORAL
Status: DISCONTINUED | OUTPATIENT
Start: 2024-09-23 | End: 2024-09-23 | Stop reason: HOSPADM

## 2024-09-23 RX ORDER — ROSUVASTATIN CALCIUM 20 MG/1
40 TABLET, COATED ORAL DAILY
Status: DISCONTINUED | OUTPATIENT
Start: 2024-09-24 | End: 2024-09-28 | Stop reason: HOSPADM

## 2024-09-23 RX ORDER — OXYCODONE HCL 5 MG/5 ML
5 SOLUTION, ORAL ORAL
Status: DISCONTINUED | OUTPATIENT
Start: 2024-09-23 | End: 2024-09-23 | Stop reason: HOSPADM

## 2024-09-23 RX ORDER — DIPHENHYDRAMINE HYDROCHLORIDE 50 MG/ML
25 INJECTION INTRAMUSCULAR; INTRAVENOUS EVERY 6 HOURS PRN
Status: DISCONTINUED | OUTPATIENT
Start: 2024-09-23 | End: 2024-09-28 | Stop reason: HOSPADM

## 2024-09-23 RX ORDER — FLUTICASONE PROPIONATE 44 UG/1
2 AEROSOL, METERED RESPIRATORY (INHALATION) 2 TIMES DAILY
Status: DISCONTINUED | OUTPATIENT
Start: 2024-09-24 | End: 2024-09-28 | Stop reason: HOSPADM

## 2024-09-23 RX ORDER — MORPHINE SULFATE 4 MG/ML
4 INJECTION INTRAVENOUS ONCE
Status: COMPLETED | OUTPATIENT
Start: 2024-09-23 | End: 2024-09-23

## 2024-09-23 RX ORDER — POLYETHYLENE GLYCOL 3350 17 G/17G
1 POWDER, FOR SOLUTION ORAL 2 TIMES DAILY PRN
Status: DISCONTINUED | OUTPATIENT
Start: 2024-09-23 | End: 2024-09-28 | Stop reason: HOSPADM

## 2024-09-23 RX ORDER — SODIUM CHLORIDE AND POTASSIUM CHLORIDE 150; 900 MG/100ML; MG/100ML
INJECTION, SOLUTION INTRAVENOUS CONTINUOUS
Status: DISCONTINUED | OUTPATIENT
Start: 2024-09-23 | End: 2024-09-28 | Stop reason: HOSPADM

## 2024-09-23 RX ORDER — MEPERIDINE HYDROCHLORIDE 25 MG/ML
25 INJECTION INTRAMUSCULAR; INTRAVENOUS; SUBCUTANEOUS
Status: DISCONTINUED | OUTPATIENT
Start: 2024-09-23 | End: 2024-09-23 | Stop reason: HOSPADM

## 2024-09-23 RX ORDER — CEFAZOLIN SODIUM 1 G/3ML
INJECTION, POWDER, FOR SOLUTION INTRAMUSCULAR; INTRAVENOUS
Status: DISCONTINUED | OUTPATIENT
Start: 2024-09-23 | End: 2024-09-23 | Stop reason: HOSPADM

## 2024-09-23 RX ORDER — BACITRACIN ZINC 500 [USP'U]/G
OINTMENT TOPICAL
Status: DISCONTINUED | OUTPATIENT
Start: 2024-09-23 | End: 2024-09-23 | Stop reason: HOSPADM

## 2024-09-23 RX ORDER — AMOXICILLIN 250 MG
1 CAPSULE ORAL
Status: DISCONTINUED | OUTPATIENT
Start: 2024-09-23 | End: 2024-09-28 | Stop reason: HOSPADM

## 2024-09-23 RX ORDER — SODIUM CHLORIDE, SODIUM LACTATE, POTASSIUM CHLORIDE, CALCIUM CHLORIDE 600; 310; 30; 20 MG/100ML; MG/100ML; MG/100ML; MG/100ML
INJECTION, SOLUTION INTRAVENOUS CONTINUOUS
Status: DISCONTINUED | OUTPATIENT
Start: 2024-09-23 | End: 2024-09-23 | Stop reason: HOSPADM

## 2024-09-23 RX ORDER — LISINOPRIL 20 MG/1
40 TABLET ORAL EVERY EVENING
Status: DISCONTINUED | OUTPATIENT
Start: 2024-09-23 | End: 2024-09-28 | Stop reason: HOSPADM

## 2024-09-23 RX ORDER — HYDROMORPHONE HYDROCHLORIDE 1 MG/ML
0.2 INJECTION, SOLUTION INTRAMUSCULAR; INTRAVENOUS; SUBCUTANEOUS
Status: DISCONTINUED | OUTPATIENT
Start: 2024-09-23 | End: 2024-09-23 | Stop reason: HOSPADM

## 2024-09-23 RX ORDER — DOCUSATE SODIUM 100 MG/1
100 CAPSULE, LIQUID FILLED ORAL 2 TIMES DAILY
Status: DISCONTINUED | OUTPATIENT
Start: 2024-09-23 | End: 2024-09-28 | Stop reason: HOSPADM

## 2024-09-23 RX ORDER — HYDRALAZINE HYDROCHLORIDE 20 MG/ML
10 INJECTION INTRAMUSCULAR; INTRAVENOUS
Status: DISCONTINUED | OUTPATIENT
Start: 2024-09-23 | End: 2024-09-28 | Stop reason: HOSPADM

## 2024-09-23 RX ORDER — ONDANSETRON 2 MG/ML
4 INJECTION INTRAMUSCULAR; INTRAVENOUS
Status: DISCONTINUED | OUTPATIENT
Start: 2024-09-23 | End: 2024-09-23 | Stop reason: HOSPADM

## 2024-09-23 RX ORDER — MIDAZOLAM HYDROCHLORIDE 1 MG/ML
INJECTION INTRAMUSCULAR; INTRAVENOUS PRN
Status: DISCONTINUED | OUTPATIENT
Start: 2024-09-23 | End: 2024-09-23 | Stop reason: SURG

## 2024-09-23 RX ORDER — AMOXICILLIN 250 MG
1 CAPSULE ORAL NIGHTLY
Status: DISCONTINUED | OUTPATIENT
Start: 2024-09-23 | End: 2024-09-28 | Stop reason: HOSPADM

## 2024-09-23 RX ORDER — MORPHINE SULFATE 4 MG/ML
4 INJECTION INTRAVENOUS
Status: DISCONTINUED | OUTPATIENT
Start: 2024-09-23 | End: 2024-09-24

## 2024-09-23 RX ORDER — LIDOCAINE HYDROCHLORIDE 20 MG/ML
INJECTION, SOLUTION EPIDURAL; INFILTRATION; INTRACAUDAL; PERINEURAL PRN
Status: DISCONTINUED | OUTPATIENT
Start: 2024-09-23 | End: 2024-09-23 | Stop reason: SURG

## 2024-09-23 RX ORDER — SODIUM PHOSPHATE,MONO-DIBASIC 19G-7G/118
1 ENEMA (ML) RECTAL
Status: DISCONTINUED | OUTPATIENT
Start: 2024-09-23 | End: 2024-09-28 | Stop reason: HOSPADM

## 2024-09-23 RX ORDER — PHENYLEPHRINE HYDROCHLORIDE 10 MG/ML
INJECTION, SOLUTION INTRAMUSCULAR; INTRAVENOUS; SUBCUTANEOUS PRN
Status: DISCONTINUED | OUTPATIENT
Start: 2024-09-23 | End: 2024-09-23 | Stop reason: SURG

## 2024-09-23 RX ORDER — ONDANSETRON 2 MG/ML
4 INJECTION INTRAMUSCULAR; INTRAVENOUS EVERY 4 HOURS PRN
Status: DISCONTINUED | OUTPATIENT
Start: 2024-09-23 | End: 2024-09-28 | Stop reason: HOSPADM

## 2024-09-23 RX ORDER — LABETALOL HYDROCHLORIDE 5 MG/ML
10 INJECTION, SOLUTION INTRAVENOUS
Status: DISCONTINUED | OUTPATIENT
Start: 2024-09-23 | End: 2024-09-28 | Stop reason: HOSPADM

## 2024-09-23 RX ORDER — PANTOPRAZOLE SODIUM 40 MG/1
40 TABLET, DELAYED RELEASE ORAL 2 TIMES DAILY
Status: DISCONTINUED | OUTPATIENT
Start: 2024-09-23 | End: 2024-09-23

## 2024-09-23 RX ORDER — CARVEDILOL 6.25 MG/1
6.25 TABLET ORAL 2 TIMES DAILY WITH MEALS
Status: DISCONTINUED | OUTPATIENT
Start: 2024-09-23 | End: 2024-09-28 | Stop reason: HOSPADM

## 2024-09-23 RX ORDER — MIDAZOLAM HYDROCHLORIDE 1 MG/ML
1 INJECTION INTRAMUSCULAR; INTRAVENOUS
Status: DISCONTINUED | OUTPATIENT
Start: 2024-09-23 | End: 2024-09-23 | Stop reason: HOSPADM

## 2024-09-23 RX ORDER — DEXAMETHASONE SODIUM PHOSPHATE 4 MG/ML
INJECTION, SOLUTION INTRA-ARTICULAR; INTRALESIONAL; INTRAMUSCULAR; INTRAVENOUS; SOFT TISSUE PRN
Status: DISCONTINUED | OUTPATIENT
Start: 2024-09-23 | End: 2024-09-23 | Stop reason: SURG

## 2024-09-23 RX ORDER — IPRATROPIUM BROMIDE AND ALBUTEROL SULFATE 2.5; .5 MG/3ML; MG/3ML
3 SOLUTION RESPIRATORY (INHALATION)
Status: DISCONTINUED | OUTPATIENT
Start: 2024-09-23 | End: 2024-09-23 | Stop reason: HOSPADM

## 2024-09-23 RX ORDER — SODIUM CHLORIDE, SODIUM LACTATE, POTASSIUM CHLORIDE, CALCIUM CHLORIDE 600; 310; 30; 20 MG/100ML; MG/100ML; MG/100ML; MG/100ML
INJECTION, SOLUTION INTRAVENOUS CONTINUOUS
Status: ACTIVE | OUTPATIENT
Start: 2024-09-23 | End: 2024-09-23

## 2024-09-23 RX ORDER — CEFAZOLIN SODIUM 1 G/3ML
INJECTION, POWDER, FOR SOLUTION INTRAMUSCULAR; INTRAVENOUS PRN
Status: DISCONTINUED | OUTPATIENT
Start: 2024-09-23 | End: 2024-09-23 | Stop reason: SURG

## 2024-09-23 RX ORDER — BISACODYL 10 MG
10 SUPPOSITORY, RECTAL RECTAL
Status: DISCONTINUED | OUTPATIENT
Start: 2024-09-23 | End: 2024-09-28 | Stop reason: HOSPADM

## 2024-09-23 RX ORDER — HYDROMORPHONE HYDROCHLORIDE 1 MG/ML
0.1 INJECTION, SOLUTION INTRAMUSCULAR; INTRAVENOUS; SUBCUTANEOUS
Status: DISCONTINUED | OUTPATIENT
Start: 2024-09-23 | End: 2024-09-23 | Stop reason: HOSPADM

## 2024-09-23 RX ORDER — HYDROMORPHONE HYDROCHLORIDE 1 MG/ML
0.5 INJECTION, SOLUTION INTRAMUSCULAR; INTRAVENOUS; SUBCUTANEOUS
Status: DISCONTINUED | OUTPATIENT
Start: 2024-09-23 | End: 2024-09-23 | Stop reason: HOSPADM

## 2024-09-23 RX ORDER — ACETAMINOPHEN 10 MG/ML
1 INJECTION, SOLUTION INTRAVENOUS ONCE
Status: COMPLETED | OUTPATIENT
Start: 2024-09-23 | End: 2024-09-23

## 2024-09-23 RX ORDER — BUPIVACAINE HYDROCHLORIDE AND EPINEPHRINE 5; 5 MG/ML; UG/ML
INJECTION, SOLUTION EPIDURAL; INTRACAUDAL; PERINEURAL
Status: DISCONTINUED | OUTPATIENT
Start: 2024-09-23 | End: 2024-09-23 | Stop reason: HOSPADM

## 2024-09-23 RX ORDER — DIPHENHYDRAMINE HCL 25 MG
25 TABLET ORAL EVERY 6 HOURS PRN
Status: DISCONTINUED | OUTPATIENT
Start: 2024-09-23 | End: 2024-09-28 | Stop reason: HOSPADM

## 2024-09-23 RX ORDER — AMLODIPINE BESYLATE 10 MG/1
10 TABLET ORAL DAILY
Status: DISCONTINUED | OUTPATIENT
Start: 2024-09-24 | End: 2024-09-28 | Stop reason: HOSPADM

## 2024-09-23 RX ORDER — ONDANSETRON 2 MG/ML
INJECTION INTRAMUSCULAR; INTRAVENOUS PRN
Status: DISCONTINUED | OUTPATIENT
Start: 2024-09-23 | End: 2024-09-23 | Stop reason: SURG

## 2024-09-23 RX ORDER — DIPHENHYDRAMINE HYDROCHLORIDE 50 MG/ML
12.5 INJECTION INTRAMUSCULAR; INTRAVENOUS
Status: DISCONTINUED | OUTPATIENT
Start: 2024-09-23 | End: 2024-09-23 | Stop reason: HOSPADM

## 2024-09-23 RX ORDER — ONDANSETRON 4 MG/1
4 TABLET, ORALLY DISINTEGRATING ORAL EVERY 4 HOURS PRN
Status: DISCONTINUED | OUTPATIENT
Start: 2024-09-23 | End: 2024-09-28 | Stop reason: HOSPADM

## 2024-09-23 RX ADMIN — ONDANSETRON 4 MG: 2 INJECTION INTRAMUSCULAR; INTRAVENOUS at 14:40

## 2024-09-23 RX ADMIN — PHENYLEPHRINE HYDROCHLORIDE 100 MCG: 10 INJECTION INTRAVENOUS at 15:50

## 2024-09-23 RX ADMIN — Medication 100 MG: at 14:25

## 2024-09-23 RX ADMIN — OMEPRAZOLE 20 MG: 20 CAPSULE, DELAYED RELEASE ORAL at 22:25

## 2024-09-23 RX ADMIN — FENTANYL CITRATE 50 MCG: 50 INJECTION, SOLUTION INTRAMUSCULAR; INTRAVENOUS at 17:59

## 2024-09-23 RX ADMIN — SODIUM CHLORIDE, POTASSIUM CHLORIDE, SODIUM LACTATE AND CALCIUM CHLORIDE: 600; 310; 30; 20 INJECTION, SOLUTION INTRAVENOUS at 14:20

## 2024-09-23 RX ADMIN — FENTANYL CITRATE 100 MCG: 50 INJECTION, SOLUTION INTRAMUSCULAR; INTRAVENOUS at 14:25

## 2024-09-23 RX ADMIN — ACETAMINOPHEN 1 G: 1000 INJECTION INTRAVENOUS at 20:32

## 2024-09-23 RX ADMIN — CEFAZOLIN 2 G: 2 INJECTION, POWDER, FOR SOLUTION INTRAMUSCULAR; INTRAVENOUS at 23:34

## 2024-09-23 RX ADMIN — METHOCARBAMOL 1000 MG: 100 INJECTION, SOLUTION INTRAMUSCULAR; INTRAVENOUS at 20:00

## 2024-09-23 RX ADMIN — LISINOPRIL 40 MG: 20 TABLET ORAL at 22:25

## 2024-09-23 RX ADMIN — PROPOFOL 200 MCG/KG/MIN: 10 INJECTION, EMULSION INTRAVENOUS at 14:20

## 2024-09-23 RX ADMIN — PHENYLEPHRINE HYDROCHLORIDE 100 MCG: 10 INJECTION INTRAVENOUS at 14:40

## 2024-09-23 RX ADMIN — INSULIN GLARGINE-YFGN 26 UNITS: 100 INJECTION, SOLUTION SUBCUTANEOUS at 22:32

## 2024-09-23 RX ADMIN — MIDAZOLAM HYDROCHLORIDE 2 MG: 2 INJECTION, SOLUTION INTRAMUSCULAR; INTRAVENOUS at 14:20

## 2024-09-23 RX ADMIN — SODIUM CHLORIDE, POTASSIUM CHLORIDE, SODIUM LACTATE AND CALCIUM CHLORIDE: 600; 310; 30; 20 INJECTION, SOLUTION INTRAVENOUS at 10:08

## 2024-09-23 RX ADMIN — DEXAMETHASONE SODIUM PHOSPHATE 8 MG: 4 INJECTION INTRA-ARTICULAR; INTRALESIONAL; INTRAMUSCULAR; INTRAVENOUS; SOFT TISSUE at 14:40

## 2024-09-23 RX ADMIN — SENNOSIDES AND DOCUSATE SODIUM 1 TABLET: 50; 8.6 TABLET ORAL at 22:25

## 2024-09-23 RX ADMIN — PHENYLEPHRINE HYDROCHLORIDE 100 MCG: 10 INJECTION INTRAVENOUS at 16:05

## 2024-09-23 RX ADMIN — DOCUSATE SODIUM 100 MG: 100 CAPSULE, LIQUID FILLED ORAL at 22:25

## 2024-09-23 RX ADMIN — PHENYLEPHRINE HYDROCHLORIDE 100 MCG: 10 INJECTION INTRAVENOUS at 15:20

## 2024-09-23 RX ADMIN — PHENYLEPHRINE HYDROCHLORIDE 100 MCG: 10 INJECTION INTRAVENOUS at 16:20

## 2024-09-23 RX ADMIN — MEPERIDINE HYDROCHLORIDE 25 MG: 25 INJECTION INTRAMUSCULAR; INTRAVENOUS; SUBCUTANEOUS at 18:36

## 2024-09-23 RX ADMIN — PROPOFOL 150 MG: 10 INJECTION, EMULSION INTRAVENOUS at 14:25

## 2024-09-23 RX ADMIN — FENTANYL CITRATE 100 MCG: 50 INJECTION, SOLUTION INTRAMUSCULAR; INTRAVENOUS at 15:25

## 2024-09-23 RX ADMIN — Medication 1 APPLICATOR: at 22:26

## 2024-09-23 RX ADMIN — FENTANYL CITRATE 100 MCG: 50 INJECTION, SOLUTION INTRAMUSCULAR; INTRAVENOUS at 16:29

## 2024-09-23 RX ADMIN — FENTANYL CITRATE 50 MCG: 50 INJECTION, SOLUTION INTRAMUSCULAR; INTRAVENOUS at 18:24

## 2024-09-23 RX ADMIN — CARVEDILOL 6.25 MG: 6.25 TABLET, FILM COATED ORAL at 22:25

## 2024-09-23 RX ADMIN — POTASSIUM CHLORIDE AND SODIUM CHLORIDE: 900; 150 INJECTION, SOLUTION INTRAVENOUS at 21:59

## 2024-09-23 RX ADMIN — FENTANYL CITRATE 100 MCG: 50 INJECTION, SOLUTION INTRAMUSCULAR; INTRAVENOUS at 17:00

## 2024-09-23 RX ADMIN — Medication: at 22:02

## 2024-09-23 RX ADMIN — FENTANYL CITRATE 100 MCG: 50 INJECTION, SOLUTION INTRAMUSCULAR; INTRAVENOUS at 15:00

## 2024-09-23 RX ADMIN — PHENYLEPHRINE HYDROCHLORIDE 100 MCG: 10 INJECTION INTRAVENOUS at 15:35

## 2024-09-23 RX ADMIN — MORPHINE SULFATE 4 MG: 4 INJECTION, SOLUTION INTRAMUSCULAR; INTRAVENOUS at 21:48

## 2024-09-23 RX ADMIN — LIDOCAINE HYDROCHLORIDE 40 MG: 20 INJECTION, SOLUTION EPIDURAL; INFILTRATION; INTRACAUDAL at 14:25

## 2024-09-23 RX ADMIN — MIDAZOLAM HYDROCHLORIDE 1 MG: 1 INJECTION, SOLUTION INTRAMUSCULAR; INTRAVENOUS at 18:29

## 2024-09-23 RX ADMIN — FENTANYL CITRATE 100 MCG: 50 INJECTION, SOLUTION INTRAMUSCULAR; INTRAVENOUS at 17:30

## 2024-09-23 RX ADMIN — PHENYLEPHRINE HYDROCHLORIDE 100 MCG: 10 INJECTION INTRAVENOUS at 15:00

## 2024-09-23 RX ADMIN — ROCURONIUM BROMIDE 20 MG: 10 INJECTION, SOLUTION INTRAVENOUS at 14:40

## 2024-09-23 RX ADMIN — CEFAZOLIN 2 G: 1 INJECTION, POWDER, FOR SOLUTION INTRAMUSCULAR; INTRAVENOUS at 14:20

## 2024-09-23 RX ADMIN — SUGAMMADEX 200 MG: 100 INJECTION, SOLUTION INTRAVENOUS at 18:05

## 2024-09-23 RX ADMIN — FENTANYL CITRATE 100 MCG: 50 INJECTION, SOLUTION INTRAMUSCULAR; INTRAVENOUS at 16:00

## 2024-09-23 SDOH — ECONOMIC STABILITY: TRANSPORTATION INSECURITY
IN THE PAST 12 MONTHS, HAS LACK OF RELIABLE TRANSPORTATION KEPT YOU FROM MEDICAL APPOINTMENTS, MEETINGS, WORK OR FROM GETTING THINGS NEEDED FOR DAILY LIVING?: NO

## 2024-09-23 SDOH — ECONOMIC STABILITY: TRANSPORTATION INSECURITY
IN THE PAST 12 MONTHS, HAS THE LACK OF TRANSPORTATION KEPT YOU FROM MEDICAL APPOINTMENTS OR FROM GETTING MEDICATIONS?: NO

## 2024-09-23 ASSESSMENT — LIFESTYLE VARIABLES
EVER HAD A DRINK FIRST THING IN THE MORNING TO STEADY YOUR NERVES TO GET RID OF A HANGOVER: NO
ON A TYPICAL DAY WHEN YOU DRINK ALCOHOL HOW MANY DRINKS DO YOU HAVE: 0
CONSUMPTION TOTAL: INCOMPLETE
ALCOHOL_USE: NO
HOW MANY TIMES IN THE PAST YEAR HAVE YOU HAD 5 OR MORE DRINKS IN A DAY: 0
EVER FELT BAD OR GUILTY ABOUT YOUR DRINKING: NO
HAVE YOU EVER FELT YOU SHOULD CUT DOWN ON YOUR DRINKING: NO
DOES PATIENT WANT TO STOP DRINKING: CANNOT ASSESS
REASON UNABLE TO ASSESS: DOES NOT DRINK
AVERAGE NUMBER OF DAYS PER WEEK YOU HAVE A DRINK CONTAINING ALCOHOL: 0

## 2024-09-23 ASSESSMENT — PAIN DESCRIPTION - PAIN TYPE
TYPE: SURGICAL PAIN
TYPE: ACUTE PAIN;SURGICAL PAIN
TYPE: SURGICAL PAIN

## 2024-09-23 ASSESSMENT — PAIN SCALES - GENERAL: PAIN_LEVEL: 5

## 2024-09-23 ASSESSMENT — SOCIAL DETERMINANTS OF HEALTH (SDOH)
WITHIN THE PAST 12 MONTHS, THE FOOD YOU BOUGHT JUST DIDN'T LAST AND YOU DIDN'T HAVE MONEY TO GET MORE: NEVER TRUE
WITHIN THE LAST YEAR, HAVE YOU BEEN KICKED, HIT, SLAPPED, OR OTHERWISE PHYSICALLY HURT BY YOUR PARTNER OR EX-PARTNER?: NO
WITHIN THE LAST YEAR, HAVE YOU BEEN HUMILIATED OR EMOTIONALLY ABUSED IN OTHER WAYS BY YOUR PARTNER OR EX-PARTNER?: NO
WITHIN THE LAST YEAR, HAVE TO BEEN RAPED OR FORCED TO HAVE ANY KIND OF SEXUAL ACTIVITY BY YOUR PARTNER OR EX-PARTNER?: NO
WITHIN THE LAST YEAR, HAVE YOU BEEN AFRAID OF YOUR PARTNER OR EX-PARTNER?: NO
WITHIN THE PAST 12 MONTHS, YOU WORRIED THAT YOUR FOOD WOULD RUN OUT BEFORE YOU GOT THE MONEY TO BUY MORE: NEVER TRUE
IN THE PAST 12 MONTHS, HAS THE ELECTRIC, GAS, OIL, OR WATER COMPANY THREATENED TO SHUT OFF SERVICE IN YOUR HOME?: NO

## 2024-09-23 ASSESSMENT — PATIENT HEALTH QUESTIONNAIRE - PHQ9
1. LITTLE INTEREST OR PLEASURE IN DOING THINGS: NOT AT ALL
2. FEELING DOWN, DEPRESSED, IRRITABLE, OR HOPELESS: NOT AT ALL
SUM OF ALL RESPONSES TO PHQ9 QUESTIONS 1 AND 2: 0

## 2024-09-23 NOTE — ANESTHESIA PROCEDURE NOTES
Airway    Date/Time: 9/23/2024 2:26 PM    Performed by: Leandro Mcgee M.D.  Authorized by: Leandro Mcgee M.D.    Location:  OR  Urgency:  Elective  Indications for Airway Management:  Anesthesia      Spontaneous Ventilation: absent    Sedation Level:  Deep  Preoxygenated: Yes    Patient Position:  Sniffing  Final Airway Type:  Endotracheal airway  Final Endotracheal Airway:  ETT  Cuffed: Yes    Technique Used for Successful ETT Placement:  Direct laryngoscopy    Insertion Site:  Oral  Blade Type:  Rahel  Laryngoscope Blade/Videolaryngoscope Blade Size:  3  ETT Size (mm):  8.0  Measured from:  Teeth  ETT to Teeth (cm):  24  Placement Verified by: auscultation and capnometry    Cormack-Lehane Classification:  Grade IIa - partial view of glottis  Number of Attempts at Approach:  1

## 2024-09-23 NOTE — OR NURSING
Per Dr Peralta case cancelled d/t case before running late. Pt updated, PIV removed. Pt escorted to exit in stable condition.

## 2024-09-23 NOTE — ANESTHESIA PROCEDURE NOTES
Arterial Line    Performed by: Leandro Mcgee M.D.  Authorized by: Leandro Mcgee M.D.    Start Time:  9/23/2024 2:25 PM  End Time:  9/23/2024 2:31 PM  Localization: surface landmarks    Patient Location:  OR  Indication: continuous blood pressure monitoring        Catheter Size:  20 G  Seldinger Technique?: Yes    Laterality:  Right  Site:  Radial artery  Line Secured:  Transparent dressing, tape and antimicrobial disc  Events: patient tolerated procedure well with no complications

## 2024-09-23 NOTE — PROGRESS NOTES
Medication history reviewed with pt. Med rec is complete.  Allergies reviewed, per pt  Interviewed pt with wife at bedside with permission from pt.    Patient has not had any outpatient antibiotics in the last 30 days.    Pt is not on any anticoagulants

## 2024-09-23 NOTE — ANESTHESIA PREPROCEDURE EVALUATION
Case: [id number] Date/Time: 09/23/24 1115    Procedure: POSTERIOR EXPLORATION OF C3-T1 FUSION, EXTENSION TO C2 WITH OARM (Spine Cervical)    Anesthesia type: General    Pre-op diagnosis: CERVICAL SPINE INSTABILITY    Location: [facility] OR 05 / SURGERY [facility]    Surgeons: Sukhwinder Peralta M.D.            Relevant Problems   PULMONARY   (positive) COPD (chronic obstructive pulmonary disease) (HCC)      CARDIAC   (positive) HTN (hypertension)       Physical Exam    Airway   Mallampati: II  TM distance: >3 FB  Neck ROM: full       Cardiovascular - normal exam  Rhythm: regular  Rate: normal  (-) murmur     Dental - normal exam           Pulmonary - normal exam  Breath sounds clear to auscultation     Abdominal    Neurological - normal exam                   Anesthesia Plan    ASA 3       Plan - general       Airway plan will be ETT          Induction: intravenous    Postoperative Plan: Postoperative administration of opioids is intended.    Pertinent diagnostic labs and testing reviewed    Informed Consent:    Anesthetic plan and risks discussed with patient.    Use of blood products discussed with: patient whom consented to blood products.

## 2024-09-24 LAB
ANION GAP SERPL CALC-SCNC: 18 MMOL/L (ref 7–16)
BUN SERPL-MCNC: 14 MG/DL (ref 8–22)
CALCIUM SERPL-MCNC: 8.9 MG/DL (ref 8.5–10.5)
CHLORIDE SERPL-SCNC: 103 MMOL/L (ref 96–112)
CO2 SERPL-SCNC: 19 MMOL/L (ref 20–33)
CREAT SERPL-MCNC: 0.94 MG/DL (ref 0.5–1.4)
ERYTHROCYTE [DISTWIDTH] IN BLOOD BY AUTOMATED COUNT: 42.5 FL (ref 35.9–50)
GFR SERPLBLD CREATININE-BSD FMLA CKD-EPI: 83 ML/MIN/1.73 M 2
GLUCOSE BLD STRIP.AUTO-MCNC: 141 MG/DL (ref 65–99)
GLUCOSE BLD STRIP.AUTO-MCNC: 143 MG/DL (ref 65–99)
GLUCOSE BLD STRIP.AUTO-MCNC: 161 MG/DL (ref 65–99)
GLUCOSE SERPL-MCNC: 229 MG/DL (ref 65–99)
HCT VFR BLD AUTO: 41.4 % (ref 42–52)
HGB BLD-MCNC: 14.1 G/DL (ref 14–18)
MCH RBC QN AUTO: 30.2 PG (ref 27–33)
MCHC RBC AUTO-ENTMCNC: 34.1 G/DL (ref 32.3–36.5)
MCV RBC AUTO: 88.7 FL (ref 81.4–97.8)
PLATELET # BLD AUTO: 166 K/UL (ref 164–446)
PMV BLD AUTO: 10.1 FL (ref 9–12.9)
POTASSIUM SERPL-SCNC: 4.4 MMOL/L (ref 3.6–5.5)
RBC # BLD AUTO: 4.67 M/UL (ref 4.7–6.1)
SODIUM SERPL-SCNC: 140 MMOL/L (ref 135–145)
WBC # BLD AUTO: 10.5 K/UL (ref 4.8–10.8)

## 2024-09-24 PROCEDURE — 51798 US URINE CAPACITY MEASURE: CPT

## 2024-09-24 PROCEDURE — 770001 HCHG ROOM/CARE - MED/SURG/GYN PRIV*

## 2024-09-24 PROCEDURE — 36415 COLL VENOUS BLD VENIPUNCTURE: CPT

## 2024-09-24 PROCEDURE — 82962 GLUCOSE BLOOD TEST: CPT

## 2024-09-24 PROCEDURE — 97535 SELF CARE MNGMENT TRAINING: CPT

## 2024-09-24 PROCEDURE — 700105 HCHG RX REV CODE 258: Performed by: NURSE PRACTITIONER

## 2024-09-24 PROCEDURE — 80048 BASIC METABOLIC PNL TOTAL CA: CPT

## 2024-09-24 PROCEDURE — A9270 NON-COVERED ITEM OR SERVICE: HCPCS | Performed by: CLINICAL NURSE SPECIALIST

## 2024-09-24 PROCEDURE — 94664 DEMO&/EVAL PT USE INHALER: CPT

## 2024-09-24 PROCEDURE — A9270 NON-COVERED ITEM OR SERVICE: HCPCS | Performed by: NURSE PRACTITIONER

## 2024-09-24 PROCEDURE — 700102 HCHG RX REV CODE 250 W/ 637 OVERRIDE(OP): Performed by: NURSE PRACTITIONER

## 2024-09-24 PROCEDURE — 85027 COMPLETE CBC AUTOMATED: CPT

## 2024-09-24 PROCEDURE — 700102 HCHG RX REV CODE 250 W/ 637 OVERRIDE(OP): Performed by: CLINICAL NURSE SPECIALIST

## 2024-09-24 PROCEDURE — 97166 OT EVAL MOD COMPLEX 45 MIN: CPT

## 2024-09-24 PROCEDURE — 700101 HCHG RX REV CODE 250: Performed by: NURSE PRACTITIONER

## 2024-09-24 PROCEDURE — 700111 HCHG RX REV CODE 636 W/ 250 OVERRIDE (IP): Mod: JZ | Performed by: CLINICAL NURSE SPECIALIST

## 2024-09-24 PROCEDURE — 94669 MECHANICAL CHEST WALL OSCILL: CPT

## 2024-09-24 PROCEDURE — 97162 PT EVAL MOD COMPLEX 30 MIN: CPT

## 2024-09-24 PROCEDURE — 700111 HCHG RX REV CODE 636 W/ 250 OVERRIDE (IP): Performed by: NURSE PRACTITIONER

## 2024-09-24 RX ORDER — ACETAMINOPHEN 325 MG/1
650 TABLET ORAL EVERY 4 HOURS PRN
Status: DISCONTINUED | OUTPATIENT
Start: 2024-09-24 | End: 2024-09-28 | Stop reason: HOSPADM

## 2024-09-24 RX ORDER — PROCHLORPERAZINE EDISYLATE 5 MG/ML
10 INJECTION INTRAMUSCULAR; INTRAVENOUS EVERY 6 HOURS PRN
Status: DISCONTINUED | OUTPATIENT
Start: 2024-09-24 | End: 2024-09-28 | Stop reason: HOSPADM

## 2024-09-24 RX ORDER — HYDROCODONE BITARTRATE AND ACETAMINOPHEN 10; 325 MG/1; MG/1
1 TABLET ORAL EVERY 4 HOURS PRN
Status: DISCONTINUED | OUTPATIENT
Start: 2024-09-24 | End: 2024-09-28 | Stop reason: HOSPADM

## 2024-09-24 RX ORDER — HYDROCODONE BITARTRATE AND ACETAMINOPHEN 5; 325 MG/1; MG/1
1 TABLET ORAL EVERY 4 HOURS PRN
Status: DISCONTINUED | OUTPATIENT
Start: 2024-09-24 | End: 2024-09-28 | Stop reason: HOSPADM

## 2024-09-24 RX ORDER — MORPHINE SULFATE 4 MG/ML
2 INJECTION INTRAVENOUS EVERY 4 HOURS PRN
Status: DISCONTINUED | OUTPATIENT
Start: 2024-09-24 | End: 2024-09-28 | Stop reason: HOSPADM

## 2024-09-24 RX ORDER — SCOLOPAMINE TRANSDERMAL SYSTEM 1 MG/1
1 PATCH, EXTENDED RELEASE TRANSDERMAL
Status: DISCONTINUED | OUTPATIENT
Start: 2024-09-24 | End: 2024-09-28 | Stop reason: HOSPADM

## 2024-09-24 RX ADMIN — DOCUSATE SODIUM 100 MG: 100 CAPSULE, LIQUID FILLED ORAL at 06:01

## 2024-09-24 RX ADMIN — AMLODIPINE BESYLATE 10 MG: 10 TABLET ORAL at 06:00

## 2024-09-24 RX ADMIN — METFORMIN HYDROCHLORIDE 500 MG: 500 TABLET ORAL at 17:46

## 2024-09-24 RX ADMIN — HYDROCODONE BITARTRATE AND ACETAMINOPHEN 1 TABLET: 10; 325 TABLET ORAL at 23:18

## 2024-09-24 RX ADMIN — Medication 1 APPLICATOR: at 17:46

## 2024-09-24 RX ADMIN — CEFAZOLIN 2 G: 2 INJECTION, POWDER, FOR SOLUTION INTRAMUSCULAR; INTRAVENOUS at 06:03

## 2024-09-24 RX ADMIN — DOCUSATE SODIUM 100 MG: 100 CAPSULE, LIQUID FILLED ORAL at 17:46

## 2024-09-24 RX ADMIN — Medication 1 APPLICATOR: at 06:07

## 2024-09-24 RX ADMIN — HYDROCODONE BITARTRATE AND ACETAMINOPHEN 1 TABLET: 10; 325 TABLET ORAL at 13:31

## 2024-09-24 RX ADMIN — POTASSIUM CHLORIDE AND SODIUM CHLORIDE: 900; 150 INJECTION, SOLUTION INTRAVENOUS at 09:30

## 2024-09-24 RX ADMIN — OMEPRAZOLE 20 MG: 20 CAPSULE, DELAYED RELEASE ORAL at 06:00

## 2024-09-24 RX ADMIN — ROSUVASTATIN CALCIUM 40 MG: 20 TABLET, FILM COATED ORAL at 06:00

## 2024-09-24 RX ADMIN — SCOPOLAMINE 1 PATCH: 1.5 PATCH, EXTENDED RELEASE TRANSDERMAL at 13:19

## 2024-09-24 RX ADMIN — HYDROCODONE BITARTRATE AND ACETAMINOPHEN 1 TABLET: 10; 325 TABLET ORAL at 18:49

## 2024-09-24 RX ADMIN — INSULIN GLARGINE-YFGN 26 UNITS: 100 INJECTION, SOLUTION SUBCUTANEOUS at 17:52

## 2024-09-24 RX ADMIN — SENNOSIDES AND DOCUSATE SODIUM 1 TABLET: 50; 8.6 TABLET ORAL at 21:21

## 2024-09-24 RX ADMIN — METFORMIN HYDROCHLORIDE 500 MG: 500 TABLET ORAL at 09:29

## 2024-09-24 RX ADMIN — PROCHLORPERAZINE EDISYLATE 10 MG: 5 INJECTION INTRAMUSCULAR; INTRAVENOUS at 12:26

## 2024-09-24 RX ADMIN — CARVEDILOL 6.25 MG: 6.25 TABLET, FILM COATED ORAL at 17:46

## 2024-09-24 RX ADMIN — HYDROCODONE BITARTRATE AND ACETAMINOPHEN 1 TABLET: 10; 325 TABLET ORAL at 09:29

## 2024-09-24 RX ADMIN — POTASSIUM CHLORIDE AND SODIUM CHLORIDE: 900; 150 INJECTION, SOLUTION INTRAVENOUS at 18:05

## 2024-09-24 RX ADMIN — OMEPRAZOLE 20 MG: 20 CAPSULE, DELAYED RELEASE ORAL at 17:46

## 2024-09-24 RX ADMIN — CARVEDILOL 6.25 MG: 6.25 TABLET, FILM COATED ORAL at 09:29

## 2024-09-24 RX ADMIN — ONDANSETRON 4 MG: 2 INJECTION INTRAMUSCULAR; INTRAVENOUS at 09:14

## 2024-09-24 RX ADMIN — LISINOPRIL 40 MG: 20 TABLET ORAL at 17:46

## 2024-09-24 ASSESSMENT — COGNITIVE AND FUNCTIONAL STATUS - GENERAL
DRESSING REGULAR LOWER BODY CLOTHING: A LOT
STANDING UP FROM CHAIR USING ARMS: A LITTLE
SUGGESTED CMS G CODE MODIFIER DAILY ACTIVITY: CL
DRESSING REGULAR UPPER BODY CLOTHING: A LITTLE
MOBILITY SCORE: 16
MOVING TO AND FROM BED TO CHAIR: TOTAL
DRESSING REGULAR LOWER BODY CLOTHING: TOTAL
HELP NEEDED FOR BATHING: A LOT
MOVING FROM LYING ON BACK TO SITTING ON SIDE OF FLAT BED: A LITTLE
WALKING IN HOSPITAL ROOM: A LOT
WALKING IN HOSPITAL ROOM: TOTAL
CLIMB 3 TO 5 STEPS WITH RAILING: TOTAL
TOILETING: A LOT
EATING MEALS: A LITTLE
STANDING UP FROM CHAIR USING ARMS: TOTAL
MOVING TO AND FROM BED TO CHAIR: A LITTLE
TURNING FROM BACK TO SIDE WHILE IN FLAT BAD: A LITTLE
SUGGESTED CMS G CODE MODIFIER DAILY ACTIVITY: CK
PERSONAL GROOMING: A LITTLE
HELP NEEDED FOR BATHING: TOTAL
SUGGESTED CMS G CODE MODIFIER MOBILITY: CK
MOVING FROM LYING ON BACK TO SITTING ON SIDE OF FLAT BED: TOTAL
DAILY ACTIVITIY SCORE: 10
DRESSING REGULAR UPPER BODY CLOTHING: TOTAL
TOILETING: TOTAL
SUGGESTED CMS G CODE MODIFIER MOBILITY: CN
DAILY ACTIVITIY SCORE: 16
CLIMB 3 TO 5 STEPS WITH RAILING: A LOT
TURNING FROM BACK TO SIDE WHILE IN FLAT BAD: TOTAL
PERSONAL GROOMING: A LITTLE
MOBILITY SCORE: 6

## 2024-09-24 ASSESSMENT — PAIN DESCRIPTION - PAIN TYPE
TYPE: ACUTE PAIN
TYPE: ACUTE PAIN;SURGICAL PAIN
TYPE: ACUTE PAIN;SURGICAL PAIN
TYPE: ACUTE PAIN

## 2024-09-24 ASSESSMENT — GAIT ASSESSMENTS
DISTANCE (FEET): 2
GAIT LEVEL OF ASSIST: MINIMAL ASSIST
ASSISTIVE DEVICE: FRONT WHEEL WALKER
DEVIATION: SHUFFLED GAIT;INCREASED BASE OF SUPPORT

## 2024-09-24 ASSESSMENT — LIFESTYLE VARIABLES
ON A TYPICAL DAY WHEN YOU DRINK ALCOHOL HOW MANY DRINKS DO YOU HAVE: 0
TOTAL SCORE: 0
EVER FELT BAD OR GUILTY ABOUT YOUR DRINKING: NO
EVER HAD A DRINK FIRST THING IN THE MORNING TO STEADY YOUR NERVES TO GET RID OF A HANGOVER: NO
AVERAGE NUMBER OF DAYS PER WEEK YOU HAVE A DRINK CONTAINING ALCOHOL: 0
HAVE YOU EVER FELT YOU SHOULD CUT DOWN ON YOUR DRINKING: NO
CONSUMPTION TOTAL: NEGATIVE
DOES PATIENT WANT TO STOP DRINKING: NO
TOTAL SCORE: 0
ALCOHOL_USE: NO
TOTAL SCORE: 0
HAVE PEOPLE ANNOYED YOU BY CRITICIZING YOUR DRINKING: NO
HOW MANY TIMES IN THE PAST YEAR HAVE YOU HAD 5 OR MORE DRINKS IN A DAY: 0

## 2024-09-24 ASSESSMENT — ACTIVITIES OF DAILY LIVING (ADL): TOILETING: INDEPENDENT

## 2024-09-24 NOTE — PROGRESS NOTES
Vista Aspen cervical collar applied to pt with RN assistance. Collar fits well and patient is tolerating this DME well at this time.     Contact traction for any questions or concerns regarding this brace.

## 2024-09-24 NOTE — ANESTHESIA TIME REPORT
Anesthesia Start and Stop Event Times       Date Time Event    9/23/2024 1402 Ready for Procedure     1420 Anesthesia Start     1818 Anesthesia Stop          Responsible Staff  09/23/24      Name Role Begin End    Leandro Mcgee M.D. Anesth 1420 1818          Overtime Reason:  no overtime (within assigned shift)    Comments:

## 2024-09-24 NOTE — PROGRESS NOTES
Virtual Nurse admission and rounding complete.    Round Needs: No needs at this time. Pt resting in bed.

## 2024-09-24 NOTE — OR NURSING
1812: Pt arrived from OR, handoff received from anesthesiologist and RN. Patient asleep, vitals stable. Dressing to posterior neck, c/d/I, hemovac in place.     1827: patient medicated for pain per MAR.     1855: Patient's wife updated on status    1915: Report give to pacu Jane LORENZO. Belongings retrieved from locker.

## 2024-09-24 NOTE — CARE PLAN
Problem: Pain - Standard  Goal: Alleviation of pain or a reduction in pain to the patient’s comfort goal  Description: Target End Date:  Prior to discharge or change in level of care    Document on Vitals flowsheet    1.  Document pain using the appropriate pain scale per order or unit policy  2.  Educate and implement non-pharmacologic comfort measures (i.e. relaxation, distraction, massage, cold/heat therapy, etc.)  3.  Pain management medications as ordered  4.  Reassess pain after pain med administration per policy  5.  If opiods administered assess patient's response to pain medication is appropriate per POSS sedation scale  6.  Follow pain management plan developed in collaboration with patient and interdisciplinary team (including palliative care or pain specialists if applicable)  Outcome: Met     Problem: Knowledge Deficit - Standard  Goal: Patient and family/care givers will demonstrate understanding of plan of care, disease process/condition, diagnostic tests and medications  Description: Target End Date:  1-3 days or as soon as patient condition allows    Document in Patient Education    1.  Patient and family/caregiver oriented to unit, equipment, visitation policy and means for communicating concern  2.  Complete/review Learning Assessment  3.  Assess knowledge level of disease process/condition, treatment plan, diagnostic tests and medications  4.  Explain disease process/condition, treatment plan, diagnostic tests and medications  Outcome: Met     Problem: Skin Integrity  Goal: Skin integrity is maintained or improved  Description: Target End Date:  Prior to discharge or change in level of care    Document interventions on Skin Risk/Keaton flowsheet groups and corresponding LDA    1.  Assess and monitor skin integrity, appearance and/or temperature  2.  Assess risk factors for impaired skin integrity and/or pressures ulcers  3.  Implement precautions to protect skin integrity in collaboration with  interdisciplinary team  4.  Implement pressure ulcer prevention protocol if at risk for skin breakdown  5.  Confirm wound care consult if at risk for skin breakdown  6.  Ensure patient use of pressure relieving devices  (Low air loss bed, waffle overlay, heel protectors, ROHO cushion, etc)  Outcome: Progressing     Problem: Fall Risk  Goal: Patient will remain free from falls  Description: Target End Date:  Prior to discharge or change in level of care    Document interventions on the [facility] Fall Risk Assessment    1.  Assess for fall risk factors  2.  Implement fall precautions  Outcome: Met   The patient is Stable - Low risk of patient condition declining or worsening    Shift Goals  Clinical Goals: Pain control, stable vital signs and neuro check, monitor blood sugar and hemovac drain  Patient Goals: Pain control, rest,c omfort  Family Goals: N/A    Progress made toward(s) clinical / shift goals:      Pt. Vital signs WDL.   Pt. Has no new skin integrity issue/ impairment.   Pt. Verbalized understanding on the care provided.   Pt. Didn't complain of pain  from 1-10, 10 being the most painful. Pain medications given as ordered, on   Morphine PCA.   Pt. Didn't fall under RN care. Fall precautions in place.

## 2024-09-24 NOTE — PROGRESS NOTES
Patient with nausea (some relief with zofran), however persisting now too soon for next dose [facility]

## 2024-09-24 NOTE — PROGRESS NOTES
Pt. Was transported to the floor via  hospital bed. Pt. Was A&Ox4, on oxygen at 3L NC. With reyes cath in place. Aspen collar in place.  No signs and symptoms of distress.  Pt. Was transferred to the room  safely, belongings and call light within reach.

## 2024-09-24 NOTE — PROGRESS NOTES
4 Eyes Skin Assessment Completed by BJ Trejo and BJ Sainz.    Head WDL  Ears WDL  Nose WDL  Mouth WDL  Neck Incision, surgical site on posterior part.   Breast/Chest Redness, blanching  Shoulder Blades WDL  Spine WDL  (R) Arm/Elbow/Hand Discoloration, dry. PIV G18  forearm  Abdomen WDL  Groin WDL. Hawk in place  Scrotum/Coccyx/Buttocks Redness and Blanching  (R) Leg Scab, redness, blanching on the shin  (L) Leg WDL  (R) Heel/Foot/Toe WDL  (L) Heel/Foot/Toe WDL          Devices In Places Blood Pressure Cuff, Pulse Ox, Hawk, SCD's, Nasal Cannula, and Cervical Collar      Interventions In Place Gray Ear Foams, TAP System, Pillows, Barrier Cream, Heels Loaded W/Pillows, and Pressure Redistribution Mattress    Possible Skin Injury No    Pictures Uploaded Into Epic N/A  Wound Consult Placed N/A  RN Wound Prevention Protocol Ordered No

## 2024-09-24 NOTE — PROGRESS NOTES
No void since cath removal, continuing iv fluids and encouraging po intake.  Bladder scan done max residual obtained was 176 mls.  Will monitor and endorse.  Patient unable to void, and does not have the urge at this time.

## 2024-09-24 NOTE — RESPIRATORY CARE
COPD EDUCATION by COPD CLINICAL EDUCATOR  9/24/2024  at  9:00 AM by Pal Wick, RRT     Patient interviewed by education team.  Patient declined or is unable to participate in the full program.  Therefore, a short intervention has been conducted.  A comprehensive packet including information about COPD, types of treatments to manage their disease and safe home Oxygen usage was provided and reviewed with patient at the bedside.    Reviewed current medications with patient at bedside, [facility] 1983 [facility]    COPD Screen  COPD Risk Screening  Do you have a history of COPD?: Yes  Do you have a Pulmonologist?: [facility] ([facility])    COPD Assessment  COPD Clinical Specialists ONLY  COPD Education Initiated: Yes--Short Intervention (quit smoking in 1983, reviewed current medications at bedside, gave spacer and flutter with demonstration.)  Is this a COPD exacerbation patient?: No  DME Company: [facility]  DME Equipment Type: nebulizer (not currently in use)  Physician Name: [facility]  [facility]ologist [facility]: [facility]  $ Demo/Eval of SVN's, MDI's and Aerosols: Yes (demonstration of flutter and spacer use)  (OP) Pulmonary Function Testing: Yes (done at [facility] confirmed COPD per patient)  Interdisciplinary Rounds: Attendance at Rounds (30 Min)    PFT Results    [facility] COPD by [facility] PFT per patient    Meds to Beds  [facility] provides bedside medication delivery for all eligible patients at discharge and you have been automatically enrolled in the Meds to Beds Program. Would you like to opt out of this program for any reason?: No - Stay Opted In  Reason the patient would like to opt out of Bedside Medication Delivery at Discharge?: Patient prefers another pharmacy ([facility])     MY COPD ACTION PLAN     It is recommended that patients and physicians /healthcare providers complete this action plan together. This plan should be discussed at each physician visit and updated as needed.    The green, yellow and red zones show groups of symptoms  "of COPD. This list of symptoms is not comprehensive, and you may experience other symptoms. In the \"Actions\" column, your healthcare provider has recommended actions for you to take based on your symptoms.    Patient Name: Pérez Willams   YOB: 1945   Last Updated on: 9/24/2024  8:59 AM   Green Zone:  I am doing well today Actions     Usual activitiy and exercise level   Take daily medications     Usual amounts of cough and phlegm/mucus   Use oxygen as prescribed     Sleep well at night   Continue regular exercise/diet plan     Appetite is good   At all times avoid cigarette smoke, inhaled irritants     Daily Medications (these medications are taken every day):   Tiotropium Bromide Monohydrate (Spiriva)  Fluticasone Propionate (Flovent)   2 Puffs Once daily  Twice daily     Additional Information:  Take your medications as directed by your Doctor, rinse mouth after using flovent and use provided spacer.    Yellow Zone:  I am having a bad day or a COPD flare Actions     More breathless than usual   Continue daily medications     I have less energy for my daily activities   Use quick relief inhaler as ordered     Increased or thicker phlegm/mucus   Use oxygen as prescribed     Using quick relief inhaler/nebulizer more often   Get plenty of rest     Swelling of ankles more than usual   Use pursed lip breathing     More coughing than usual   At all times avoid cigarette smoke, inhaled irritants     I feel like I have a \"chest cold\"     Poor sleep and my symptoms woke me up     My appetite is not good     My medicine is not helping      Call provider immediately if symptoms don’t improve     Continue daily medications, add rescue medications:   Albuterol 2 Puffs Every 6 hours PRN       Medications to be used during a flare up, (as Discussed with Provider):           Additional Information:  Use with spacer    Red Zone:  I need urgent medical care Actions     Severe shortness of breath even at rest "   Call 911 or seek medical care immediately     Not able to do any activity because of breathing      Fever or shaking chills      Feeling confused or very drowsy       Chest pains      Coughing up blood

## 2024-09-24 NOTE — OR SURGEON
Immediate Post OP Note    PreOp Diagnosis: cervical instability      PostOp Diagnosis: same       Procedure(s):  REMOVAL OF HARDWARE CERVICAL 5- THORACIC 1, CERVICAL 2-3 LAMINECTOMY, CERVICAL 2-4 FUSION - Wound Class: Clean    Surgeon(s):  Sukhwinder Peralta M.D.    Anesthesiologist/Type of Anesthesia:  Anesthesiologist: Leandro Mcgee M.D./General    Surgical Staff:  Assistant: CORTEZ Siddiqi  Circulator: Marge Groves R.N.  Operating Room Assistant (ORA): Quentin Youssef  Relief Circulator: Kaylan Cat R.N.  Relief Scrub: Fang Alex  Scrub Person: Angeles Dailey  Radiology Technologist: Tiara Kimbrough    Specimens removed if any:  * No specimens in log *    Estimated Blood Loss: 150 cc     Findings: good decompression, hardware removal and hardware placement     Complications: dural tear         9/23/2024 5:57 PM CORTEZ Siddiqi

## 2024-09-24 NOTE — ANESTHESIA POSTPROCEDURE EVALUATION
Patient: Pérez Willams    Procedure Summary       Date: 09/23/24 Room / Location: [facility] 05 / SURGERY [facility]    Anesthesia Start: 1420 Anesthesia Stop: 1818    Procedure: REMOVAL OF HARDWARE CERVICAL 5- THORACIC 1, CERVICAL 2-3 LAMINECTOMY, CERVICAL 2-4 FUSION (Spine Cervical) Diagnosis: (CERVICAL SPINE INSTABILITY)    Surgeons: Sukhwinder Peralta M.D. Responsible Provider: Leandro Mcgee M.D.    Anesthesia Type: general ASA Status: 3            Final Anesthesia Type: general  Last vitals  BP   Blood Pressure : (!) 174/86    Temp   36 °C (96.8 °F)    Pulse   77   Resp   16    SpO2   92 %      Anesthesia Post Evaluation    Patient location during evaluation: PACU  Patient participation: complete - patient participated  Level of consciousness: awake and alert  Pain score: 5    Airway patency: patent  Anesthetic complications: no  Cardiovascular status: hemodynamically stable  Respiratory status: acceptable  Hydration status: euvolemic    PONV: none          No notable events documented.     Nurse Pain Score: 0 (NPRS)

## 2024-09-24 NOTE — DISCHARGE PLANNING
"RN CM met with patient at bedside to complete assessment. Patient pleasantly A&Ox4 and able to verify information on face sheet.   Lives with his functional/retired partner, Kole, in single story home in [facility].   He is retired and denies any financial concerns.   Denies mental health or substance abuse concerns stating, \"I haven't drank in 43 years\".   Independent with ADLs and IADLs at baseline.   Reports having access to [employer] but does not have or use any other DME.   Owns vehicle and drives at baseline.   [facility] and [facility] reports the provider \"[facility]\" from visit to visit.   Has Medicare, PFA notified to update on face sheet.   Pending orthopaedic clearance and PT/OT evaluations/recommendations.   Wife will be able to drive patient home upon discharge if that is his ultimate disposition.   [place] PASRR: [id number]    Case Management Discharge Planning    Admission Date: 9/23/2024  GMLOS: 2.4  ALOS: 1    6-Clicks ADL Score: 10  6-Clicks Mobility Score: 6  PT and/or OT Eval ordered: Yes  Post-acute Referrals Ordered: NA  Post-acute Choice Obtained: NA  Has referral(s) been sent to post-acute provider:  NA      Anticipated Discharge Dispo: Discharge Disposition: D/T to home under A care in anticipation of covered skilled care (06)  Discharge Address: [address] [place] [address]  Discharge Contact Phone Number: [phone]    DME Needed: Yes    DME Ordered: No    Action(s) Taken: Updated Provider/Nurse on Discharge Plan, Patient Conference, and DC Assessment Complete (See below)    Escalations Completed: None    Medically Clear: No    Next Steps: Pending clearance and PT/OT evaluations/recommendations.     Barriers to Discharge: Medical clearance and Pending PT Evaluation    Is the patient up for discharge tomorrow: Unknown.     Care Transition Team Assessment    Information Source  Orientation Level: Oriented X4  Information Given By: Patient  Informant's Name: Pérez  Who is " "responsible for making decisions for patient? : Patient    Readmission Evaluation  Is this a readmission?: No    Elopement Risk  Legal Hold: No  Ambulatory or Self Mobile in Wheelchair: No-Not an Elopement Risk    Interdisciplinary Discharge Planning  Does Admitting Nurse Feel This Could be a Complex Discharge?: No  [facility]-\"[facility] changes\"  Lives with - Patient's Self Care Capacity: Spouse  Patient or legal guardian wants to designate a caregiver: No  Support Systems: Spouse / Significant Other  Housing / Facility: [address]  Name of Care Facility: N/A    Discharge Preparedness  What is your plan after discharge?: Uncertain - pending medical team collaboration  What are your discharge supports?: Spouse  Prior Functional Level: Ambulatory, Drives Self, Independent with Activities of Daily Living, Independent with Medication Management  Difficulity with ADLs: None  Difficulity with IADLs: None    Functional Assesment  Prior Functional Level: Ambulatory, Drives Self, Independent with Activities of Daily Living, Independent with Medication Management    Finances  Financial Barriers to Discharge: No  Prescription Coverage: Yes    Vision / Hearing Impairment  Vision Impairment : Yes  Right Eye Vision: Impaired, Wears Glasses  Left Eye Vision: Impaired, Wears Glasses  Hearing Impairment : No    Values / Beliefs / Concerns  Values / Beliefs Concerns : No  Cultural Requests During Hospitalization: no    Advance Directive  Advance Directive?: None  Advance Directive offered?: AD Booklet refused    Domestic Abuse  Possible Abuse/Neglect Reported to:: Not Applicable    Psychological Assessment  History of Substance Abuse: None  History of Psychiatric Problems: No  Non-compliant with Treatment: No  Newly Diagnosed Illness: Yes    Discharge Risks or Barriers  Discharge risks or barriers?: No  Patient risk factors: Other (comment) (None Identified)    Anticipated Discharge Information  Discharge " Disposition: D/T to home under A care in anticipation of covered skilled care (06)  Discharge Address: [address] [place] [address]  Discharge Contact Phone Number: [phone]

## 2024-09-24 NOTE — OR NURSING
Assumed care of pt from Jane LORENZO.  Pt A/O x 3 disoriented to date/year.  C-collar on.  Wife at bedside.  1 clear bag of belongings returned to pt w pt cell phone and .  Report given to Maya LORENZO.  Pt transported to room on 3L O2 NC tank @ 254.

## 2024-09-24 NOTE — PROGRESS NOTES
Neurosurgery Progress Note    Subjective:  HOB up 45 deg, no HA  Pain to neck controlled, no arm s/s  Thinks improved from pre op   Hawk, eating, has not been OOB yet  Collar     Exam:  Str 5/  Inc c/d w/ hvac 75    BP  Min: 114/55  Max: 174/86  Pulse  Av.3  Min: 65  Max: 79  Resp  Av.2  Min: 12  Max: 20  Temp  Av.5 °C (97.7 °F)  Min: 36 °C (96.8 °F)  Max: 36.8 °C (98.2 °F)  SpO2  Av.7 %  Min: 92 %  Max: 96 %    No data recorded    Recent Labs     24  0315   WBC 10.5   RBC 4.67*   HEMOGLOBIN 14.1   HEMATOCRIT 41.4*   MCV 88.7   MCH 30.2   MCHC 34.1   RDW 42.5   PLATELETCT 166   MPV 10.1                   Intake/Output                         24 - 2459 24 - 2459      1403-1829 Total  8256-7679 Total                 Intake    I.V.  --  -- --  21  -- 21    PCA End of Shift Total Volume (ml) -- -- -- 21 -- 21    Total Intake -- -- -- 21 -- 21       Output    Urine  --  825 825  --  -- --    Number of Times Voided -- 1 x 1 x -- -- --    Urine Void (mL) -- 375 375 -- -- --    Output (mL) (Urethral Catheter Non-latex 16 Fr.) -- 450 450 -- -- --    Drains  --  115 115  --  -- --    Output (mL) (Closed/Suction Drain 1 Posterior Neck Hemovac 10 Fr.) -- 115 115 -- -- --    Total Output -- 940 940 -- -- --       Net I/O     -- -940 -940 21 -- 21              Intake/Output Summary (Last 24 hours) at 2024 0820  Last data filed at 2024 0700  Gross per 24 hour   Intake 21 ml   Output 940 ml   Net -919 ml             amLODIPine  10 mg DAILY    carvedilol  6.25 mg BID WITH MEALS    fluticasone  2 Puff BID    insulin GLARGINE  26 Units Q EVENING    lisinopril  40 mg Q EVENING    metFORMIN  500 mg BID WITH MEALS    rosuvastatin  40 mg DAILY    tiotropium  5 mcg DAILY    Pharmacy Consult Request  1 Each PHARMACY TO DOSE    MD ALERT...DO NOT ADMINISTER NSAIDS or ASPIRIN unless ORDERED By Neurosurgery  1 Each PRN    docusate sodium  100 mg BID     senna-docusate  1 Tablet Nightly    senna-docusate  1 Tablet Q24HRS PRN    polyethylene glycol/lytes  1 Packet BID PRN    magnesium hydroxide  30 mL QDAY PRN    bisacodyl  10 mg Q24HRS PRN    sodium phosphate  1 Each Once PRN    0.9 % NaCl with KCl 20 mEq 1,000 mL   Continuous    morphine   Continuous    morphine injection  4 mg Once PRN    diphenhydrAMINE  25 mg Q6HRS PRN    Or    diphenhydrAMINE  25 mg Q6HRS PRN    ondansetron  4 mg Q4HRS PRN    ondansetron  4 mg Q4HRS PRN    tizanidine  4 mg TID PRN    labetalol  10 mg Q HOUR PRN    hydrALAZINE  10 mg Q HOUR PRN    omeprazole  20 mg BID    Nozin nasal  swab  1 Applicator BID       Assessment and Plan:  Hospital day # 2  POD# 1 post lami and ext of fusion to C2, dural tear  Chemical prophylactic DVT therapy: No  Start date/time: ambulatory     NM intact  Pain control- d/c pca, begin PRN tylenol, PRN norco (does not eldon oxy), PRN MRs, PRN IV morphine for BT pain  HOB > 45 deg until dinnertime  Pt/ot/amb, collar when oob  D/c reyes, bowel/bladder protocol  Cont hvac, d/c when < 30 in 8 hrs  Vitals ok except O2 2 LPM- sats 92-95- wean as eldon, IS Q 1 HR WA , cbc ok

## 2024-09-24 NOTE — THERAPY
Physical Therapy   Initial Evaluation     Patient Name: Pérez Willams  Age:  79 y.o., Sex:  male  Medical Record #: [id number]  Today's Date: 9/24/2024     Precautions  Precautions: Fall Risk;Spinal / Back Precautions ;Cervical Collar    Comments: Arlette marques EOB    Assessment  Patient is 79 y.o. male POD #1 removal of hardware C5-T1, C2-3 laminectomy, and C2-4 fusion. Surgery was complicated by a dural tear therefore pt with HOB>45 degrees per orders. No HA on arrival of therapist. Pt reports he has not mobilized yet due to his catheter. RN at bedside and removed PCA and catheter prior to mobility. Education provided on spine precautions, brace, and general recommendations for recovery. Poor tolerance to mobility due to nausea, pain, and dizziness. BP stable throughout, question response to PCA. Anticipate pt will be able to dc home in 1-2 more PT tx. PT will cont while pt is in acute care setting to address deficits.     Patient seen for team evaluation with Occupational Therapist for the following reason(s): first time mobilizing since sx    Plan    Physical Therapy Initial Treatment Plan   Treatment Plan : Bed Mobility, Equipment, Family / Caregiver Training, Gait Training, Neuro Re-Education / Balance, Self Care / Home Evaluation, Stair Training, Therapeutic Activities, Therapeutic Exercise  Treatment Frequency: 5 Times per Week  Duration: Until Therapy Goals Met    DC Equipment Recommendations: Unable to determine at this time  Discharge Recommendations: Other - (anticipate home in 1-2 more PT tx)          09/24/24 0920   Vitals   O2 (LPM) 2   O2 Delivery Device Nasal Cannula   Vitals Comments SBP in 150s while EOB and nauseaous   Pain 0 - 10 Group   Therapist Pain Assessment During Activity;Nurse Notified;8   Non Verbal Descriptors   Non Verbal Scale  Calm   Prior Living Situation   Prior Services Home-Independent   Housing / Facility 1 Story House   Steps Into Home 2   Steps In Home 0   Equipment  Owned 4-Wheel Walker   Lives with - Patient's Self Care Capacity Spouse   Comments said his wife can assist if shes in a good mood   Prior Level of Functional Mobility   Bed Mobility Independent   Transfer Status Independent   Ambulation Independent   Ambulation Distance community   Assistive Devices Used None   Stairs Independent   Comments pt is active and into bow hunting   History of Falls   History of Falls No   Cognition    Level of Consciousness Alert   Sensation Upper Body   Upper Extremity Sensation  X   Comments pt reporting chronic B hand tingling   Active ROM Lower Body    Active ROM Lower Body  WDL   Strength Lower Body   Lower Body Strength  X   Comments functional but weak likely from PCA and lack of food   Sensation Lower Body   Lower Extremity Sensation   WDL   Other Treatments   Other Treatments Provided spent time educating on importance of mobility, c-spine precautions, bed mobility, and general guidelines for return to activity   Balance Assessment   Sitting Balance (Static) Fair   Sitting Balance (Dynamic) Fair   Standing Balance (Static) Fair -   Standing Balance (Dynamic) Poor +   Weight Shift Sitting Fair   Weight Shift Standing Fair   Comments FWW   Bed Mobility    Supine to Sit Contact Guard Assist   Sit to Supine Contact Guard Assist   Scooting Contact Guard Assist   Comments HOB 45 per orders   Gait Analysis   Gait Level Of Assist Minimal Assist   Assistive Device Front Wheel Walker   Distance (Feet) 2   # of Times Distance was Traveled 1   Deviation Shuffled Gait;Increased Base Of Support   Weight Bearing Status no restrictions   Comments pt declined further due to nausea, unsteadiness, and pain   Functional Mobility   Sit to Stand Contact Guard Assist   Mobility EOB, STS 2x, steps by EOB   Edema / Skin Assessment   Edema / Skin  Not Assessed   Patient / Family Goals    Patient / Family Goal #1 none stated   Short Term Goals    Short Term Goal # 1 pt will be able to complete  supine<>sitting from flat bed with SPV in 6tx in order to progress to home   Short Term Goal # 2 pt will be able to complete STS with FWW and SPV in 6tx in order to decrease fall risk   Short Term Goal # 3 pt will be able to ambulate 150ft with FWW and SPV in 6tx in order to return to household gait   Short Term Goal # 4 pt will be able to negotiate 2 steps with SPV in 6tx in order to enter and exit home   Education Group   Education Provided Role of Physical Therapist;Cervical Precautions;Use of Assistive Device;Brace Wear and Care   Cervical Precautions Patient Response Patient;Acceptance;Explanation;Handout;Verbal Demonstration;Action Demonstration   Role of Physical Therapist Patient Response Patient;Acceptance;Explanation;Demonstration;Action Demonstration;Verbal Demonstration   Use of Assistive Device Patient Response Patient;Acceptance;Explanation;Demonstration;Action Demonstration;Verbal Demonstration   Brace Wear & Care Patient Response Patient;Acceptance;Explanation;Handout;Verbal Demonstration;Action Demonstration   Anticipated Discharge Equipment and Recommendations   DC Equipment Recommendations Unable to determine at this time   Discharge Recommendations Other -  (anticipate home in 1-2 more PT tx)

## 2024-09-24 NOTE — CARE PLAN
The patient is Stable - Low risk of patient condition declining or worsening    Shift Goals  Clinical Goals: pain control, stable neuro exam, drain output.   Patient Goals: pain control  Family Goals: not present    Progress made toward(s) clinical / shift goals: Pain controlled with prn medication thus far, stable neuro exam.  Will record drain output q8.     Patient is not progressing towards the following goals:

## 2024-09-25 LAB
ANION GAP SERPL CALC-SCNC: 13 MMOL/L (ref 7–16)
BUN SERPL-MCNC: 16 MG/DL (ref 8–22)
CALCIUM SERPL-MCNC: 8.8 MG/DL (ref 8.5–10.5)
CHLORIDE SERPL-SCNC: 107 MMOL/L (ref 96–112)
CO2 SERPL-SCNC: 24 MMOL/L (ref 20–33)
CREAT SERPL-MCNC: 0.86 MG/DL (ref 0.5–1.4)
ERYTHROCYTE [DISTWIDTH] IN BLOOD BY AUTOMATED COUNT: 47.2 FL (ref 35.9–50)
GFR SERPLBLD CREATININE-BSD FMLA CKD-EPI: 89 ML/MIN/1.73 M 2
GLUCOSE BLD STRIP.AUTO-MCNC: 103 MG/DL (ref 65–99)
GLUCOSE SERPL-MCNC: 115 MG/DL (ref 65–99)
HCT VFR BLD AUTO: 39.2 % (ref 42–52)
HGB BLD-MCNC: 12.7 G/DL (ref 14–18)
MCH RBC QN AUTO: 30.2 PG (ref 27–33)
MCHC RBC AUTO-ENTMCNC: 32.4 G/DL (ref 32.3–36.5)
MCV RBC AUTO: 93.1 FL (ref 81.4–97.8)
PLATELET # BLD AUTO: 163 K/UL (ref 164–446)
PMV BLD AUTO: 10.3 FL (ref 9–12.9)
POTASSIUM SERPL-SCNC: 4.8 MMOL/L (ref 3.6–5.5)
RBC # BLD AUTO: 4.21 M/UL (ref 4.7–6.1)
SODIUM SERPL-SCNC: 144 MMOL/L (ref 135–145)
WBC # BLD AUTO: 12.8 K/UL (ref 4.8–10.8)

## 2024-09-25 PROCEDURE — A9270 NON-COVERED ITEM OR SERVICE: HCPCS | Performed by: CLINICAL NURSE SPECIALIST

## 2024-09-25 PROCEDURE — 51798 US URINE CAPACITY MEASURE: CPT

## 2024-09-25 PROCEDURE — 770001 HCHG ROOM/CARE - MED/SURG/GYN PRIV*

## 2024-09-25 PROCEDURE — A9270 NON-COVERED ITEM OR SERVICE: HCPCS | Performed by: NURSE PRACTITIONER

## 2024-09-25 PROCEDURE — 85027 COMPLETE CBC AUTOMATED: CPT

## 2024-09-25 PROCEDURE — 700102 HCHG RX REV CODE 250 W/ 637 OVERRIDE(OP): Performed by: CLINICAL NURSE SPECIALIST

## 2024-09-25 PROCEDURE — 80048 BASIC METABOLIC PNL TOTAL CA: CPT

## 2024-09-25 PROCEDURE — 700111 HCHG RX REV CODE 636 W/ 250 OVERRIDE (IP): Mod: JZ | Performed by: CLINICAL NURSE SPECIALIST

## 2024-09-25 PROCEDURE — 36415 COLL VENOUS BLD VENIPUNCTURE: CPT

## 2024-09-25 PROCEDURE — 97535 SELF CARE MNGMENT TRAINING: CPT

## 2024-09-25 PROCEDURE — 700102 HCHG RX REV CODE 250 W/ 637 OVERRIDE(OP): Performed by: NURSE PRACTITIONER

## 2024-09-25 PROCEDURE — 700101 HCHG RX REV CODE 250: Performed by: NURSE PRACTITIONER

## 2024-09-25 PROCEDURE — 82962 GLUCOSE BLOOD TEST: CPT

## 2024-09-25 RX ADMIN — POTASSIUM CHLORIDE AND SODIUM CHLORIDE: 900; 150 INJECTION, SOLUTION INTRAVENOUS at 04:43

## 2024-09-25 RX ADMIN — FLUTICASONE PROPIONATE 88 MCG: 44 AEROSOL, METERED RESPIRATORY (INHALATION) at 05:45

## 2024-09-25 RX ADMIN — TIZANIDINE 4 MG: 4 TABLET ORAL at 08:59

## 2024-09-25 RX ADMIN — HYDROCODONE BITARTRATE AND ACETAMINOPHEN 1 TABLET: 10; 325 TABLET ORAL at 19:49

## 2024-09-25 RX ADMIN — OMEPRAZOLE 20 MG: 20 CAPSULE, DELAYED RELEASE ORAL at 17:07

## 2024-09-25 RX ADMIN — HYDROCODONE BITARTRATE AND ACETAMINOPHEN 1 TABLET: 10; 325 TABLET ORAL at 14:02

## 2024-09-25 RX ADMIN — DOCUSATE SODIUM 100 MG: 100 CAPSULE, LIQUID FILLED ORAL at 04:35

## 2024-09-25 RX ADMIN — AMLODIPINE BESYLATE 10 MG: 10 TABLET ORAL at 04:34

## 2024-09-25 RX ADMIN — DOCUSATE SODIUM 100 MG: 100 CAPSULE, LIQUID FILLED ORAL at 17:06

## 2024-09-25 RX ADMIN — TIOTROPIUM BROMIDE INHALATION SPRAY 5 MCG: 3.12 SPRAY, METERED RESPIRATORY (INHALATION) at 05:45

## 2024-09-25 RX ADMIN — Medication 1 APPLICATOR: at 17:08

## 2024-09-25 RX ADMIN — OMEPRAZOLE 20 MG: 20 CAPSULE, DELAYED RELEASE ORAL at 04:35

## 2024-09-25 RX ADMIN — ROSUVASTATIN CALCIUM 40 MG: 20 TABLET, FILM COATED ORAL at 04:34

## 2024-09-25 RX ADMIN — Medication 1 APPLICATOR: at 04:34

## 2024-09-25 RX ADMIN — CARVEDILOL 6.25 MG: 6.25 TABLET, FILM COATED ORAL at 17:02

## 2024-09-25 RX ADMIN — LISINOPRIL 40 MG: 20 TABLET ORAL at 17:04

## 2024-09-25 RX ADMIN — METFORMIN HYDROCHLORIDE 500 MG: 500 TABLET ORAL at 08:22

## 2024-09-25 RX ADMIN — FLUTICASONE PROPIONATE 88 MCG: 44 AEROSOL, METERED RESPIRATORY (INHALATION) at 17:09

## 2024-09-25 RX ADMIN — HYDROCODONE BITARTRATE AND ACETAMINOPHEN 1 TABLET: 10; 325 TABLET ORAL at 04:37

## 2024-09-25 RX ADMIN — METFORMIN HYDROCHLORIDE 500 MG: 500 TABLET ORAL at 17:05

## 2024-09-25 RX ADMIN — MORPHINE SULFATE 2 MG: 4 INJECTION, SOLUTION INTRAMUSCULAR; INTRAVENOUS at 21:15

## 2024-09-25 RX ADMIN — MORPHINE SULFATE 2 MG: 4 INJECTION, SOLUTION INTRAMUSCULAR; INTRAVENOUS at 13:12

## 2024-09-25 RX ADMIN — SENNOSIDES AND DOCUSATE SODIUM 1 TABLET: 50; 8.6 TABLET ORAL at 21:14

## 2024-09-25 RX ADMIN — CARVEDILOL 6.25 MG: 6.25 TABLET, FILM COATED ORAL at 08:23

## 2024-09-25 RX ADMIN — HYDROCODONE BITARTRATE AND ACETAMINOPHEN 1 TABLET: 10; 325 TABLET ORAL at 10:15

## 2024-09-25 RX ADMIN — MAGNESIUM HYDROXIDE 30 ML: 1200 LIQUID ORAL at 10:18

## 2024-09-25 RX ADMIN — TIZANIDINE 4 MG: 4 TABLET ORAL at 18:03

## 2024-09-25 ASSESSMENT — COGNITIVE AND FUNCTIONAL STATUS - GENERAL
SUGGESTED CMS G CODE MODIFIER DAILY ACTIVITY: CK
HELP NEEDED FOR BATHING: A LITTLE
DAILY ACTIVITIY SCORE: 18
DRESSING REGULAR UPPER BODY CLOTHING: A LITTLE
TOILETING: A LITTLE
PERSONAL GROOMING: A LITTLE
DRESSING REGULAR LOWER BODY CLOTHING: A LOT

## 2024-09-25 ASSESSMENT — PAIN DESCRIPTION - PAIN TYPE
TYPE: ACUTE PAIN

## 2024-09-25 NOTE — OP REPORT
DATE OF SERVICE:  09/23/2024     ADDENDUM     Use of an assist was required for assisting with positioning of the patient,   suctioning, assistance with placement of pars screws at C2, assistance with   suction and retraction with removal of hardware and for assistance with   closure.        ______________________________  Sukhwinder Peralta MD    CPD/AZM    DD:  09/25/2024 09:53  DT:  09/25/2024 10:56    Job#:  [id number]

## 2024-09-25 NOTE — CARE PLAN
The patient is Stable - Low risk of patient condition declining or worsening    Shift Goals  Clinical Goals: x5wrili Checks, Pain control, HOB 45 or greater, Respiratory status  Patient Goals: Pain control, rest  Family Goals: Not present    Progress made toward(s) clinical / shift goals:  yes       Problem: Skin Integrity  Goal: Skin integrity is maintained or improved  Outcome: Progressing     Problem: Respiratory  Goal: Patient will achieve/maintain optimum respiratory ventilation and gas exchange  Outcome: Progressing  Flowsheets  Taken 9/24/2024 1947 by Jillian Rider R.N.  Incentive Spirometer: Effective  Incentive Spirometer Volume: 2000 mL  Deep Breathe and Cough: Performs Correctly  Taken 9/24/2024 1933 by Jose Mars C.N.A.  O2 Delivery Device: Silicone Nasal Cannula  Note: When mobilizing, patient has auditory wheezing upon expiration. O2 applied, cough and deep breathing educated. Effective IS use and strong effort/motivation      Problem: Mobility  Goal: Patient's capacity to carry out activities will improve  Outcome: Progressing  Flowsheets  Taken 9/24/2024 2342 by Jillian Rider R.N.  Mobility:   Provided assistive devices   Monitored for signs of activity intolerance   Provided rest periods between activities   Encouraged mobilization per interdisciplinary team recommendations  Taken 9/24/2024 2328 by Jose Mars, C.N.A.  Level of Mobility: Level IV  Activity Performed: Ambulate  Time Activity Tolerated: 15 min  Distance Per Occurrence (ft.): 25 feet  # of Times Distance was Traveled: 2  Assistance: Assistance of Two or More     Problem: Self Care  Goal: Patient will have the ability to perform ADLs independently or with assistance (bathe, groom, dress, toilet and feed)  Outcome: Progressing     Problem: Infection - Standard  Goal: Patient will remain free from infection  Outcome: Progressing  Flowsheets (Taken 9/24/2024 2342)  Standard Infection Interventions:   Assessed for  signs and symptoms of infection   Implemented standard precautions   Instructed patient/family on signs and symptoms of infection   Provided education on proper hand hygiene and infection prevention measures   Assessed for removal IV, central lines, intra-arterial or urinary catheters     Problem: Wound/ / Incision Healing  Goal: Patient's wound/surgical incision will decrease in size and heals properly  Outcome: Progressing

## 2024-09-25 NOTE — PROGRESS NOTES
Neurosurgery Progress Note    Subjective:    Seen by Dr. Peralta  No headache  Feels arm symptoms better       Exam:  AAOX4  Str 5/5  Inc c/d w/ hvac 15    BP  Min: 103/70  Max: 182/83  Pulse  Av.7  Min: 64  Max: 80  Resp  Av.4  Min: 16  Max: 17  Temp  Av.7 °C (98.1 °F)  Min: 36 °C (96.8 °F)  Max: 37.3 °C (99.1 °F)  SpO2  Av.3 %  Min: 91 %  Max: 98 %    No data recorded    Recent Labs     24   WBC 10.5 12.8*   RBC 4.67* 4.21*   HEMOGLOBIN 14.1 12.7*   HEMATOCRIT 41.4* 39.2*   MCV 88.7 93.1   MCH 30.2 30.2   MCHC 34.1 32.4   RDW 42.5 47.2   PLATELETCT 166 163*   MPV 10.1 10.3     Recent Labs     24   SODIUM 140 144   POTASSIUM 4.4 4.8   CHLORIDE 103 107   CO2 19* 24   GLUCOSE 229* 115*   BUN 14 16   CREATININE 0.94 0.86   CALCIUM 8.9 8.8               Intake/Output                         24 - 24 - 24 Total  Total                 Intake    P.O.  240  -- 240  --  -- --    P.O. 240 -- 240 -- -- --    I.V.  366  -- 366  --  -- --    PCA End of Shift Total Volume (ml) 21 -- 21 -- -- --    Volume (mL) (0.9 % NaCl with KCl 20 mEq infusion) 345 -- 345 -- -- --    Total Intake 606 -- 606 -- -- --       Output    Urine  200  350 550  40  -- 40    Number of Times Voided 2 x 1 x 3 x 1 x -- 1 x    Urine Void (mL) 200 350 550 40 -- 40    Emesis  0  -- 0  --  -- --    Emesis 0 -- 0 -- -- --    Drains  55  40 95  --  -- --    Output (mL) (Closed/Suction Drain 1 Posterior Neck Hemovac 10 Fr.) 55 40 95 -- -- --    Stool  --  -- --  --  -- --    Number of Times Stooled 0 x -- 0 x -- -- --    Total Output 255 390 645 40 -- 40       Net I/O     351 -390 -39 -40 -- -40              Intake/Output Summary (Last 24 hours) at 2024 0851  Last data filed at 2024 0805  Gross per 24 hour   Intake 585 ml   Output 685 ml   Net -100 ml       $ Bladder Scan Results (mL): 176      acetaminophen  650 mg Q4HRS PRN    Or    HYDROcodone-acetaminophen  1 Tablet Q4HRS PRN    Or    HYDROcodone/acetaminophen  1 Tablet Q4HRS PRN    Or    morphine injection  2 mg Q4HRS PRN    scopolamine  1 Patch Q72HRS    prochlorperazine  10 mg Q6HRS PRN    amLODIPine  10 mg DAILY    carvedilol  6.25 mg BID WITH MEALS    fluticasone  2 Puff BID    insulin GLARGINE  26 Units Q EVENING    lisinopril  40 mg Q EVENING    metFORMIN  500 mg BID WITH MEALS    rosuvastatin  40 mg DAILY    tiotropium  5 mcg DAILY    Pharmacy Consult Request  1 Each PHARMACY TO DOSE    MD ALERT...DO NOT ADMINISTER NSAIDS or ASPIRIN unless ORDERED By Neurosurgery  1 Each PRN    docusate sodium  100 mg BID    senna-docusate  1 Tablet Nightly    senna-docusate  1 Tablet Q24HRS PRN    polyethylene glycol/lytes  1 Packet BID PRN    magnesium hydroxide  30 mL QDAY PRN    bisacodyl  10 mg Q24HRS PRN    sodium phosphate  1 Each Once PRN    0.9 % NaCl with KCl 20 mEq 1,000 mL   Continuous    diphenhydrAMINE  25 mg Q6HRS PRN    Or    diphenhydrAMINE  25 mg Q6HRS PRN    ondansetron  4 mg Q4HRS PRN    ondansetron  4 mg Q4HRS PRN    tizanidine  4 mg TID PRN    labetalol  10 mg Q HOUR PRN    hydrALAZINE  10 mg Q HOUR PRN    omeprazole  20 mg BID    Nozin nasal  swab  1 Applicator BID       Assessment and Plan:  Hospital day # 3  POD# 2 post lami and ext of fusion to C2, dural tear  Chemical prophylactic DVT therapy: No  Start date/time: ambulatory     NM intact  Pain control-  PRN tylenol, PRN norco (does not eldon oxy), PRN MRs, PRN IV morphine for BT pain  HOB as tolerated   Pt/ot/amb, collar when oob  bowel/bladder protocol  DC hvac

## 2024-09-25 NOTE — OP REPORT
DATE OF SERVICE:  09/23/2024     PREOPERATIVE DIAGNOSIS:  Prior C3-T1 laminectomy and fusion with   instrumentation 10 years ago for myelopathy with development of the C2-C3   instability.     POSTOPERATIVE DIAGNOSIS:  Prior C3-T1 laminectomy and fusion with   instrumentation 10 years ago for myelopathy with development of the C2-C3   instability.     PROCEDURE:  1.  Exploration of the patient's prior C3-T1 fusion.  2.  Removal of rods from C3-T1.  3.  Removal of bilateral pedicle screws at T1.  4.  Removal of lateral mass screws at C6 and C5.  5.  Placement of bilateral pars screws at C2.  6.  Use of O-arm neuronavigation for screw placement.  7.  Partial laminectomy, cervical 2 bilaterally.  8.  Zak placement at cervical 2, 3, and 4.  9.  Posterolateral arthrodesis cervical 2, 3, and 4 bilaterally.  10.  Use of locally harvested morselized autograft.  11.  Use of allograft.  12.  Use of modifier 22 for extensive difficulty with positioning the patient   using the Rangel table.  13.  Use of intraoperative neuromonitoring.     SURGEON:  Sukhwinder Peralta MD     ASSISTANT:  TARIQ Siddiqi     ANESTHESIA:  General.     COMPLICATIONS:  Incidental durotomy on the left side at cervical 3 repaired   primarily with a 6-0 [place] sutures along with DuraGen and fibrin glue.     ESTIMATED BLOOD LOSS:  200 mL.     DESCRIPTION OF PROCEDURE:  The patient was brought to the operating room,   identified in the usual fashion.  General endotracheal anesthesia was induced   by the anesthesia team.  The patient was then placed supine on the Rangel   table flat plate.  He was then placed in [place] 3-point pin fixation to   approximately 60 PSI.  He was then sandwiched on the Rangel table using the   bed.  He was then flipped into a prone position using the Rangel table.  This   took an additional 20 minutes for preparation for flip.  All pressure points   were meticulously padded after the flip.  We took baseline  neuromonitoring   before the flip and then after the flip, and there were no changes to   monitoring.  We then taped the shoulders down.  Surgical clippers were used to   clip the back of the patient's hair.  We then marked his previous incision   and then extended it cephalad using a marking pen.  He was then prepped and   draped in the usual sterile fashion.  Local anesthesia was infiltrated into   subcutaneous tissue.  A 10 blade was used to incise the skin.  Dissection was   carried down in a subperiosteal fashion on C2.  Retractors were progressively   deepened.  We identified the C3 lateral mass screws and the rods.  We exposed   C3 and C4 and tried to put a estefania side to side connector between C3 and C4.    We were not able to do, so at this point, we believed we had to remove the   entire kimberly at this point from C3-T1 to be able to put a new kimberly and so we   continued the exposure from C3-T1, exposing all the hardware.  During this, we   encountered a very small durotomy that was from the Bovie.  We did have CSF   leakage.  We then after adequate exposure of the durotomy, we placed two 6-0   [place] figure-of-eight sutures and had excellent closure of the leak.  We   then continued the exposure.  Once we had the exposure completed from C2 down   to T1 with all the hardware exposed, we then removed the nut caps and removed   the rods from C3-T1 bilaterally.  We then pulled up using a Kocher on each of   the individual screws and found the C3-T1 fusion mass to be excellent and   competent.  At this point, we had all the hardware exposed and did not need   the hardware at T1, C6 and C5, so these screws were removed.  FloSeal gentle   tamponade was placed in the holes bilaterally from the removed screws.  We   then spun the O-arm with excellent accuracy.  We then placed C2 pars screws   using the following sequence of events.  Navigated drill was used to drill a    hole.  Cannulating drill was then used to  cannulate the pars using O-arm   neuronavigation.  We had no arterial blood cannulating the pars.  Meenu   confirmed that we had excellent cannulation.  We then placed the appropriately   sized screw on the right.  We then moved over to the left where we placed a   shorter screw in the same sequence of events because the patient's anatomy   would not let us place as long screw on the left side given the location of   the vertebral artery.  Once both screws were placed, we then placed the   appropriately sized kimberly spanning C2, C3 and C4.  Nut caps were placed and   everything was final tightened.  Films were taken, both AP and lateral,   showing the hardware looked to be in excellent position and we were at the   correct levels and had reduced his spondylolisthesis at C2-C3.  We then used a   combination of high-speed air drill and Kerr[place] 2 and 3 punches to perform   an inferior 25% laminectomy approximately of C2 to decompress the central   canal.  There were no complications with this.  Copious amounts of antibiotic   irrigation were used to wash out the wound.  We placed a small piece of   DuraGen and then fibrin glue over the durotomy repair site.  We decorticated   the pars of C2 and the lateral masses of C3 and C4 using a high-speed air   drill.  We then packed in locally harvested morselized autograft and allograft   into the gutters bilaterally.  We left the Hemovac drain and then closed the   incision in layers and topped with staples and sterile dressing.  I was   present and scrubbed for the entire procedure.  The patient was then flipped   into a supine position on a gurney.  He was taken out of pins.  Staples were   used for bleeding from one of the pin sites.  There were no changes to   neuromonitoring throughout the entirety of the case.     Use of an assist was required for assisting with positioning of the patient,   suctioning, assistance with placement of pars screws at C2, assistance with    suction and retraction with removal of hardward and for assistance with   closure.          ______________________________  MD ABI Watkins/SAMANTHA    DD:  09/25/2024 09:51  DT:  09/25/2024 10:40    Job#:  [id number]

## 2024-09-25 NOTE — PROGRESS NOTES
Voided 200ml jesus urine in urinal without difficulty, consistent with earlier bladder scan.  Encouraged po intake as well as maintaining IV fluids.

## 2024-09-25 NOTE — CARE PLAN
The patient is Stable - Low risk of patient condition declining or worsening    Shift Goals  Clinical Goals: Pain management, neuro checks, pain control, HOB >45  Patient Goals: Pain control, rest  Family Goals: Not present    Progress made toward(s) clinical / shift goals:  Yes      Problem: Skin Integrity  Goal: Skin integrity is maintained or improved  Outcome: Progressing     Problem: Respiratory  Goal: Patient will achieve/maintain optimum respiratory ventilation and gas exchange  Outcome: Progressing     Problem: Mobility  Goal: Patient's capacity to carry out activities will improve  Outcome: Progressing    Problem: Pain - Standard  Goal: Alleviation of pain or a reduction in pain to the patient’s comfort goal  Outcome: Progressing    Patient experiencing pain related to spinal surgery. Educated patient on the pain scale and reporting pain promptly. Patient stated understanding and demonstrated understanding throughout the shift. Non-pharm interventions used in addition such as rest, repositioned, ice, extra pillows, and blankets.           Problem: Self Care  Goal: Patient will have the ability to perform ADLs independently or with assistance (bathe, groom, dress, toilet and feed)  Outcome: Progressing     Problem: Infection - Standard  Goal: Patient will remain free from infection  Outcome: Progressing     Problem: Wound/ / Incision Healing  Goal: Patient's wound/surgical incision will decrease in size and heals properly  Outcome: Progressing       Patient is not progressing towards the following goals:

## 2024-09-25 NOTE — PROGRESS NOTES
Virtual Nurse rounding complete.    Round Needs: Other: Pump is beeping in patient room.  and Notified of Patient Needs: CNA.

## 2024-09-25 NOTE — DISCHARGE PLANNING
RN ANTHONY met with patient in anticipation of home with home health in the next day or two.   Choice obtained for Home Health: 1. [facility], 2. [facility], 3. [facility].   Pending referral to be placed by neurosurgery.     Case Management Discharge Planning    Admission Date: 9/23/2024  GMLOS: 2.6  ALOS: 2    6-Clicks ADL Score: 16  6-Clicks Mobility Score: 16  PT and/or OT Eval ordered: Yes  Post-acute Referrals Ordered: No  Post-acute Choice Obtained: Yes  Has referral(s) been sent to post-acute provider:  No      Anticipated Discharge Dispo: Discharge Disposition: D/T to home under HHA care in anticipation of covered skilled care (06)  Discharge Address: [address] [place] [id number]  Discharge Contact Phone Number: [phone]    DME Needed: No    Action(s) Taken: Updated Provider/Nurse on Discharge Plan, Patient Conference, and Choice obtained    Escalations Completed: Provider    Medically Clear: No    Next Steps: Pending clearance and HH order placed by provider.     Barriers to Discharge: Medical clearance    Is the patient up for discharge tomorrow: Unknown.

## 2024-09-25 NOTE — THERAPY
"Occupational Therapy  Daily Treatment     Patient Name: Pérez Willams  Age:  79 y.o., Sex:  male  Medical Record #: [id number]  Today's Date: 9/25/2024     Precautions: Fall Risk, Spinal / Back Precautions , Cervical Collar    Comments: Arlette donned at EOB    Assessment    Pt seen for OT tx. Pt was eating breakfast upon arrival and appeared to be choking on his breakfast. Pt was able to expel food items with forceful cough; reported that it \"went down the wrong tube\" and denied having any other instances with meals or medications. RN notified. Pt was able to complete functional mobility into restroom with CGA using FWW. Pt attempted to complete standing voiding, but demo difficulty and requested to complete remainder in bed. RN notified about increased effort and possible retention. Attempted to provided training on how to manage c-collar, but pt reported that his wife and daughter \"know how to do it and can help at home.\" He was provided education on how to manage and care for the pads. Pt with no c/o dizziness with OOB ADLs. Continue to recommend home health as his daughter (who has experience with providing caregiving services) is able to provide increased assist and supervision when his wife is at work. Will continue to follow for ongoing acute OT services.     Plan    Treatment Plan Status: Continue Current Treatment Plan  Type of Treatment: Self Care / Activities of Daily Living, Adaptive Equipment, Therapeutic Exercises, Therapeutic Activity  Treatment Frequency: 5 Times per Week  Treatment Duration: Until Therapy Goals Met    DC Equipment Recommendations: None  Discharge Recommendations: Recommend home health for continued occupational therapy services     Objective      Vitals   O2 Delivery Device None - Room Air   Pain 0 - 10 Group   Therapist Pain Assessment Post Activity Pain Same as Prior to Activity;Nurse Notified  (Not rated, reported an increase in neck pain with activity)   Cognition    Level " "of Consciousness Alert   Comments Pleasant and cooperative   Balance   Sitting Balance (Static) Fair +   Sitting Balance (Dynamic) Fair +   Standing Balance (Static) Fair   Standing Balance (Dynamic) Fair   Weight Shift Sitting Good   Weight Shift Standing Fair   Skilled Intervention Verbal Cuing   Comments w/FWW   Bed Mobility    Supine to Sit Standby Assist   Sit to Supine Standby Assist   Scooting Standby Assist   Skilled Intervention Verbal Cuing   Comments HOB elevated; pt reports that he plans to sleep in his recliner when he goes home   Activities of Daily Living   Eating Independent  (Upon arrival, pt began to choke on his breakfast. Stated that \"it went down the wrong tube.\" Able to expel food with coughing. Denied any other instances of choking with meals; RN updated)   Grooming Contact Guard Assist;Standing   Upper Body Dressing Minimal Assist  (Attempted to provide training on how to manage c-collar; pt politely declined reports that his \"wife and daughter can assist\". Edu on managing pads)   Lower Body Dressing Maximal Assist  (Assist with socks; reported that family will assist at home)   Toileting Contact Guard Assist  (Assist with balance while attempting to complete standing void. Pt requrested to complete void while in bed with hand held urinal. Pt may have urinary retention; RN notified)   Skilled Intervention Verbal Cuing;Tactile Cuing;Facilitation;Compensatory Strategies   How much help from another person does the patient currently need...   6 Clicks Daily Activity Score 18   Functional Mobility   Sit to Stand Contact Guard Assist   Bed, Chair, Wheelchair Transfer Contact Guard Assist   Toilet Transfers Contact Guard Assist  (Cues to back up until he can feel the surface he is attempting to sit on on the backs of his legs prior to beginnign his descent to reduce risk of falls)   Mobility EOB <> bathroom   Activity Tolerance   Sitting in Chair 2 min  (Toilet)   Sitting Edge of Bed 3 min "   Standing 5 min   Patient / Family Goals   Patient / Family Goal #1 home ASAP   Goal #1 Outcome Progressing as expected   Short Term Goals   Short Term Goal # 1 pt will complete toilet transfer at SPV level   Goal Outcome # 1 Progressing as expected   Short Term Goal # 2 pt will complete toileting ADL at SPV level   Goal Outcome # 2 Progressing as expected   Short Term Goal # 3 pt will complete standing grooming while maintaining cervical spine pxns without cues at SPV level   Goal Outcome # 3 Progressing as expected   Short Term Goal # 4 pt will complete LB dress with AE PRN at SPV level   Goal Outcome # 4 Progressing as expected   Education Group   Education Provided Spinal Precautions;Brace Wear and Care;Transfers;Role of Occupational Therapist;Activities of Daily Living;Home Safety   Role of Occupational Therapist Patient Response Patient;Acceptance;Explanation;Verbal Demonstration   Spinal Precautions Patient Response Patient;Acceptance;Explanation;Verbal Demonstration;Action Demonstration;Reinforcement Needed   Brace Wear & Care Patient Response Patient;Acceptance;Explanation;Verbal Demonstration;Action Demonstration;Reinforcement Needed   Home Safety Patient Response Patient;Acceptance;Explanation;Verbal Demonstration  (Recommend use of shower chair and having supervision when stepping in/out of shower to reduce risk of falls)   Transfers Patient Response Patient;Acceptance;Explanation;Verbal Demonstration;Action Demonstration;Reinforcement Needed   ADL Patient Response Patient;Acceptance;Explanation;Verbal Demonstration;Action Demonstration;Reinforcement Needed

## 2024-09-25 NOTE — THERAPY
Occupational Therapy   Initial Evaluation     Patient Name: Pérez Willams  Age:  79 y.o., Sex:  male  Medical Record #: [id number]  Today's Date: 9/24/2024    Precautions: Spinal / Back Precautions   Comments: aspen collar, don at EOB ok to remove in bed per order    Assessment    Patient is 79 y.o. male s/p hardware removal C5-T1, lami and C2-4 fusion with dural tear, aspen collar when OOB. Pt presents to OT eval with ADL independence and activity tolerance limited by dizziness, nausea, vomiting and decreased balance. Pt stood at EOB but immediately felt like he could not tolerate walking to the bathroom yet. Pt attempted standing urination with urinal however was unable, reyes catheter had just been removed 5 minutes earlier. Pt is motivated for DC home with wife who can assist however not ready yet, anticipate rapid improvement in ADL independence once dizziness and nausea resolves. Acute OT to follow.     Plan    Occupational Therapy Initial Treatment Plan   Treatment Interventions: Self Care / Activities of Daily Living, Adaptive Equipment, Therapeutic Exercises, Therapeutic Activity  Treatment Frequency: 5 Times per Week  Duration: Until Therapy Goals Met    DC Equipment Recommendations: None  Discharge Recommendations: Recommend home health for continued occupational therapy services      Objective       09/24/24 0944   Prior Living Situation   Prior Services None   Housing / Facility 1 Story House   Steps Into Home 2   Steps In Home 0   Bathroom Set up Walk In Shower;Shower Chair   Equipment Owned 4-Wheel Walker;Hand Held Shower;Tub / Shower Seat   Lives with - Patient's Self Care Capacity Spouse   Comments pt reports wife able to assist (but jokes sometimes she chooses not to!)   Prior Level of ADL Function   Self Feeding Independent   Grooming / Hygiene Independent   Bathing Independent   Dressing Independent   Toileting Independent   Prior Level of IADL Function   Medication Management Independent    Laundry Independent   Kitchen Mobility Independent   Finances Independent   Home Management Independent   Shopping Independent   Prior Level Of Mobility Independent Without Device in Community   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   Precautions   Precautions Spinal / Back Precautions    Comments aspen collar   Vitals   O2 (LPM) 2   O2 Delivery Device Nasal Cannula   Pain 0 - 10 Group   Therapist Pain Assessment During Activity;Nurse Notified  (c/o cervical pain, RN present and administering medication)   Cognition    Cognition / Consciousness WDL   Level of Consciousness Alert   Strength Upper Body   Upper Body Strength  WDL   Sensation Upper Body   Upper Extremity Sensation  WDL   Upper Body Muscle Tone   Upper Body Muscle Tone  WDL   Coordination Upper Body   Coordination WDL   Balance Assessment   Sitting Balance (Static) Fair +   Sitting Balance (Dynamic) Fair +   Standing Balance (Static) Fair   Standing Balance (Dynamic) Fair   Weight Shift Sitting Good   Weight Shift Standing Fair   Comments FWW   Bed Mobility    Supine to Sit Contact Guard Assist   Sit to Supine Contact Guard Assist   Scooting Contact Guard Assist   Comments HOB elevated 45 degrees per order   ADL Assessment   Eating Independent   Grooming Seated;Minimal Assist   Upper Body Dressing Minimal Assist   Lower Body Dressing Maximal Assist   Toileting Maximal Assist   How much help from another person does the patient currently need...   Putting on and taking off regular lower body clothing? 2   Bathing (including washing, rinsing, and drying)? 2   Toileting, which includes using a toilet, bedpan, or urinal? 2   Putting on and taking off regular upper body clothing? 3   Taking care of personal grooming such as brushing teeth? 3   Eating meals? 4   6 Clicks Daily Activity Score 16   Functional Mobility   Sit to Stand Contact Guard Assist   Toilet Transfers Unable to Participate   Transfer Method Stand Step   Mobility FWW   Comments limited by  nausea/vomiting and poor activity tolerance   Activity Tolerance   Sitting in Chair NT   Sitting Edge of Bed 15min   Standing 2min x2   Patient / Family Goals   Patient / Family Goal #1 home ASAP   Short Term Goals   Short Term Goal # 1 pt will complete toilet transfer at SPV level   Short Term Goal # 2 pt will complete toileting ADL at SPV level   Short Term Goal # 3 pt will complete standing grooming while maintaining cervical spine pxns without cues at SPV level   Short Term Goal # 4 pt will complete LB dress with AE PRN at SPV level   Education Group   Education Provided Spinal Precautions;Brace Wear and Care;Role of Occupational Therapist;Activities of Daily Living;Weight Bearing Precautions;Home Safety;Transfers   Role of Occupational Therapist Patient Response Patient;Acceptance;Explanation;Verbal Demonstration   Spinal Precautions Patient Response Patient;Acceptance;Explanation;Handout;Verbal Demonstration;Action Demonstration   Brace Wear & Care Patient Response Patient;Acceptance;Explanation;Handout;Verbal Demonstration;Action Demonstration   Home Safety Patient Response Patient;Acceptance;Explanation;Handout;Verbal Demonstration;Action Demonstration   Transfers Patient Response Patient;Acceptance;Explanation;Handout;Verbal Demonstration;Action Demonstration   ADL Patient Response Patient;Acceptance;Explanation;Handout;Verbal Demonstration;Action Demonstration   Weight Bearing Precautions Patient Response Patient;Acceptance;Explanation;Handout;Verbal Demonstration;Action Demonstration   Occupational Therapy Initial Treatment Plan    Treatment Interventions Self Care / Activities of Daily Living;Adaptive Equipment;Therapeutic Exercises;Therapeutic Activity   Treatment Frequency 5 Times per Week   Duration Until Therapy Goals Met   Problem List   Problem List Decreased Active Daily Living Skills;Decreased Homemaking Skills;Decreased Functional Mobility;Decreased Activity Tolerance;Impaired Cognitive  Function;Impaired Postural Control / Balance;Limited Knowledge of Post Op Precautions   Anticipated Discharge Equipment and Recommendations   DC Equipment Recommendations None   Discharge Recommendations Recommend home health for continued occupational therapy services

## 2024-09-25 NOTE — FACE TO FACE
Face to Face Supporting Documentation - Home Health    The encounter with this patient was in whole or in part the primary reason for home health admission.    Date of encounter:   Patient:                    MRN:                       YOB: 2022  Pérez Willams  [id number]  1945     Home health to see patient for:  Skilled Nursing care for assessment, interventions & education and Physical Therapy evaluation and treatment    Skilled need for:  Surgical Aftercare postop help with ambulation, ADLs, monitor incision, meds     Skilled nursing interventions to include:  Comment: see above     Homebound status evidenced by:  Need the aid of supportive devices such as crutches, canes, wheelchairs or walkers. Leaving home requires a considerable and taxing effort. There is a normal inability to leave the home.    Community Physician to provide follow up care: Pcp Not In Computer     Optional Interventions? No      I certify the face to face encounter for this home health care referral meets the CMS requirements and the encounter/clinical assessment with the patient was, in whole, or in part, for the medical condition(s) listed above, which is the primary reason for home health care. Based on my clinical findings: the service(s) are medically necessary, support the need for home health care, and the homebound criteria are met.  I certify that this patient has had a face to face encounter by myself.  HAMILTON Siddiqi. - NPI: [id number]

## 2024-09-26 ENCOUNTER — APPOINTMENT (OUTPATIENT)
Dept: RADIOLOGY | Facility: MEDICAL CENTER | Age: 79
End: 2024-09-26
Attending: NURSE PRACTITIONER
Payer: COMMERCIAL

## 2024-09-26 LAB
ANION GAP SERPL CALC-SCNC: 11 MMOL/L (ref 7–16)
BUN SERPL-MCNC: 18 MG/DL (ref 8–22)
CALCIUM SERPL-MCNC: 9 MG/DL (ref 8.5–10.5)
CHLORIDE SERPL-SCNC: 101 MMOL/L (ref 96–112)
CO2 SERPL-SCNC: 26 MMOL/L (ref 20–33)
CREAT SERPL-MCNC: 0.86 MG/DL (ref 0.5–1.4)
ERYTHROCYTE [DISTWIDTH] IN BLOOD BY AUTOMATED COUNT: 46.5 FL (ref 35.9–50)
GFR SERPLBLD CREATININE-BSD FMLA CKD-EPI: 89 ML/MIN/1.73 M 2
GLUCOSE BLD STRIP.AUTO-MCNC: 133 MG/DL (ref 65–99)
GLUCOSE BLD STRIP.AUTO-MCNC: 82 MG/DL (ref 65–99)
GLUCOSE SERPL-MCNC: 115 MG/DL (ref 65–99)
HCT VFR BLD AUTO: 39.5 % (ref 42–52)
HGB BLD-MCNC: 13.5 G/DL (ref 14–18)
MCH RBC QN AUTO: 31.3 PG (ref 27–33)
MCHC RBC AUTO-ENTMCNC: 34.2 G/DL (ref 32.3–36.5)
MCV RBC AUTO: 91.6 FL (ref 81.4–97.8)
PLATELET # BLD AUTO: 162 K/UL (ref 164–446)
PMV BLD AUTO: 10.1 FL (ref 9–12.9)
POTASSIUM SERPL-SCNC: 4.2 MMOL/L (ref 3.6–5.5)
RBC # BLD AUTO: 4.31 M/UL (ref 4.7–6.1)
SODIUM SERPL-SCNC: 138 MMOL/L (ref 135–145)
WBC # BLD AUTO: 11.2 K/UL (ref 4.8–10.8)

## 2024-09-26 PROCEDURE — 85027 COMPLETE CBC AUTOMATED: CPT

## 2024-09-26 PROCEDURE — 97530 THERAPEUTIC ACTIVITIES: CPT

## 2024-09-26 PROCEDURE — 700102 HCHG RX REV CODE 250 W/ 637 OVERRIDE(OP): Performed by: CLINICAL NURSE SPECIALIST

## 2024-09-26 PROCEDURE — 700101 HCHG RX REV CODE 250: Performed by: NURSE PRACTITIONER

## 2024-09-26 PROCEDURE — A9270 NON-COVERED ITEM OR SERVICE: HCPCS | Performed by: CLINICAL NURSE SPECIALIST

## 2024-09-26 PROCEDURE — 80048 BASIC METABOLIC PNL TOTAL CA: CPT

## 2024-09-26 PROCEDURE — A9270 NON-COVERED ITEM OR SERVICE: HCPCS | Performed by: NURSE PRACTITIONER

## 2024-09-26 PROCEDURE — 74018 RADEX ABDOMEN 1 VIEW: CPT

## 2024-09-26 PROCEDURE — 82962 GLUCOSE BLOOD TEST: CPT | Mod: 91

## 2024-09-26 PROCEDURE — 700111 HCHG RX REV CODE 636 W/ 250 OVERRIDE (IP): Mod: JZ,JG | Performed by: NURSE PRACTITIONER

## 2024-09-26 PROCEDURE — 97116 GAIT TRAINING THERAPY: CPT

## 2024-09-26 PROCEDURE — 770001 HCHG ROOM/CARE - MED/SURG/GYN PRIV*

## 2024-09-26 PROCEDURE — 36415 COLL VENOUS BLD VENIPUNCTURE: CPT

## 2024-09-26 PROCEDURE — 97535 SELF CARE MNGMENT TRAINING: CPT

## 2024-09-26 PROCEDURE — 51798 US URINE CAPACITY MEASURE: CPT

## 2024-09-26 PROCEDURE — 700102 HCHG RX REV CODE 250 W/ 637 OVERRIDE(OP): Performed by: NURSE PRACTITIONER

## 2024-09-26 RX ADMIN — OMEPRAZOLE 20 MG: 20 CAPSULE, DELAYED RELEASE ORAL at 04:33

## 2024-09-26 RX ADMIN — LISINOPRIL 40 MG: 20 TABLET ORAL at 16:53

## 2024-09-26 RX ADMIN — CARVEDILOL 6.25 MG: 6.25 TABLET, FILM COATED ORAL at 16:52

## 2024-09-26 RX ADMIN — HYDROCODONE BITARTRATE AND ACETAMINOPHEN 1 TABLET: 10; 325 TABLET ORAL at 02:32

## 2024-09-26 RX ADMIN — Medication 1 APPLICATOR: at 16:53

## 2024-09-26 RX ADMIN — TIOTROPIUM BROMIDE INHALATION SPRAY 5 MCG: 3.12 SPRAY, METERED RESPIRATORY (INHALATION) at 04:28

## 2024-09-26 RX ADMIN — HYDROCODONE BITARTRATE AND ACETAMINOPHEN 1 TABLET: 10; 325 TABLET ORAL at 12:38

## 2024-09-26 RX ADMIN — AMLODIPINE BESYLATE 10 MG: 10 TABLET ORAL at 04:33

## 2024-09-26 RX ADMIN — BISACODYL 10 MG: 10 SUPPOSITORY RECTAL at 08:42

## 2024-09-26 RX ADMIN — POLYETHYLENE GLYCOL 3350 1 PACKET: 17 POWDER, FOR SOLUTION ORAL at 04:34

## 2024-09-26 RX ADMIN — METFORMIN HYDROCHLORIDE 500 MG: 500 TABLET ORAL at 16:53

## 2024-09-26 RX ADMIN — FLUTICASONE PROPIONATE 88 MCG: 44 AEROSOL, METERED RESPIRATORY (INHALATION) at 04:28

## 2024-09-26 RX ADMIN — DOCUSATE SODIUM 100 MG: 100 CAPSULE, LIQUID FILLED ORAL at 04:34

## 2024-09-26 RX ADMIN — INSULIN GLARGINE-YFGN 26 UNITS: 100 INJECTION, SOLUTION SUBCUTANEOUS at 17:00

## 2024-09-26 RX ADMIN — POTASSIUM CHLORIDE AND SODIUM CHLORIDE: 900; 150 INJECTION, SOLUTION INTRAVENOUS at 08:42

## 2024-09-26 RX ADMIN — OMEPRAZOLE 20 MG: 20 CAPSULE, DELAYED RELEASE ORAL at 16:53

## 2024-09-26 RX ADMIN — ONDANSETRON 4 MG: 4 TABLET, ORALLY DISINTEGRATING ORAL at 18:15

## 2024-09-26 RX ADMIN — Medication 1 APPLICATOR: at 04:38

## 2024-09-26 RX ADMIN — DOCUSATE SODIUM 100 MG: 100 CAPSULE, LIQUID FILLED ORAL at 16:53

## 2024-09-26 RX ADMIN — METHYLNALTREXONE BROMIDE 12 MG: 12 INJECTION, SOLUTION SUBCUTANEOUS at 12:38

## 2024-09-26 RX ADMIN — HYDROCODONE BITARTRATE AND ACETAMINOPHEN 1 TABLET: 10; 325 TABLET ORAL at 21:20

## 2024-09-26 RX ADMIN — ROSUVASTATIN CALCIUM 40 MG: 20 TABLET, FILM COATED ORAL at 04:32

## 2024-09-26 RX ADMIN — CARVEDILOL 6.25 MG: 6.25 TABLET, FILM COATED ORAL at 08:42

## 2024-09-26 RX ADMIN — FLUTICASONE PROPIONATE 88 MCG: 44 AEROSOL, METERED RESPIRATORY (INHALATION) at 16:53

## 2024-09-26 RX ADMIN — TIZANIDINE 4 MG: 4 TABLET ORAL at 04:34

## 2024-09-26 RX ADMIN — HYDROCODONE BITARTRATE AND ACETAMINOPHEN 1 TABLET: 10; 325 TABLET ORAL at 16:52

## 2024-09-26 RX ADMIN — SENNOSIDES AND DOCUSATE SODIUM 1 TABLET: 50; 8.6 TABLET ORAL at 21:21

## 2024-09-26 RX ADMIN — HYDROCODONE BITARTRATE AND ACETAMINOPHEN 1 TABLET: 10; 325 TABLET ORAL at 08:42

## 2024-09-26 RX ADMIN — METFORMIN HYDROCHLORIDE 500 MG: 500 TABLET ORAL at 08:42

## 2024-09-26 ASSESSMENT — COGNITIVE AND FUNCTIONAL STATUS - GENERAL
SUGGESTED CMS G CODE MODIFIER MOBILITY: CK
WALKING IN HOSPITAL ROOM: A LITTLE
DRESSING REGULAR LOWER BODY CLOTHING: A LITTLE
TOILETING: A LITTLE
MOBILITY SCORE: 17
SUGGESTED CMS G CODE MODIFIER DAILY ACTIVITY: CJ
TURNING FROM BACK TO SIDE WHILE IN FLAT BAD: A LITTLE
MOVING TO AND FROM BED TO CHAIR: A LITTLE
STANDING UP FROM CHAIR USING ARMS: A LITTLE
CLIMB 3 TO 5 STEPS WITH RAILING: A LOT
HELP NEEDED FOR BATHING: A LITTLE
DRESSING REGULAR UPPER BODY CLOTHING: A LITTLE
DAILY ACTIVITIY SCORE: 20
MOVING FROM LYING ON BACK TO SITTING ON SIDE OF FLAT BED: A LITTLE

## 2024-09-26 ASSESSMENT — PAIN DESCRIPTION - PAIN TYPE
TYPE: ACUTE PAIN
TYPE: ACUTE PAIN;SURGICAL PAIN
TYPE: ACUTE PAIN
TYPE: ACUTE PAIN
TYPE: ACUTE PAIN;SURGICAL PAIN
TYPE: ACUTE PAIN

## 2024-09-26 ASSESSMENT — GAIT ASSESSMENTS
DEVIATION: SHUFFLED GAIT;INCREASED BASE OF SUPPORT
DISTANCE (FEET): 50
GAIT LEVEL OF ASSIST: MINIMAL ASSIST
ASSISTIVE DEVICE: FRONT WHEEL WALKER

## 2024-09-26 NOTE — PROGRESS NOTES
Virtual Nurse rounding complete.    Round Needs: No needs at this time. Pt in chair eating breakfast.

## 2024-09-26 NOTE — CARE PLAN
The patient is Stable - Low risk of patient condition declining or worsening    Shift Goals  Clinical Goals: q4 Neuro Checks, HOB >45, Pain control, Rest, Mobility  Patient Goals: Pain control, Rest  Family Goals: Not present    Progress made toward(s) clinical / shift goals:  Yes       Problem: Pain - Standard  Goal: Alleviation of pain or a reduction in pain to the patient’s comfort goal  Outcome: Progressing  Flowsheets (Taken 9/26/2024 0232)  Non Verbal Scale: Calm  Pain Rating Scale (NPRS): 9     Problem: Skin Integrity  Goal: Skin integrity is maintained or improved  Outcome: Progressing     Problem: Respiratory  Goal: Patient will achieve/maintain optimum respiratory ventilation and gas exchange  Outcome: Progressing  Flowsheets  Taken 9/25/2024 2340 by Jose Mars, C.N.A.  O2 Delivery Device: Silicone Nasal Cannula  Taken 9/25/2024 1945 by Jillian Rider, R.N.  Incentive Spirometer: Effective  Incentive Spirometer Volume: 2000 mL  Deep Breathe and Cough: Performs Correctly     Problem: Mobility  Goal: Patient's capacity to carry out activities will improve  Outcome: Progressing  Flowsheets  Taken 9/26/2024 0200 by Jose Mars, C.N.A.  Level of Mobility: Level IV  Activity Performed:   Ambulate   Up to bathroom   Back to bed  Time Activity Tolerated: 15 min  Distance Per Occurrence (ft.): 25 feet  # of Times Distance was Traveled: 2  Assistance: Standby Assist  Taken 9/24/2024 2342 by Jillian Rider, R.N.  Mobility:   Provided assistive devices   Monitored for signs of activity intolerance   Provided rest periods between activities   Encouraged mobilization per interdisciplinary team recommendations     Problem: Self Care  Goal: Patient will have the ability to perform ADLs independently or with assistance (bathe, groom, dress, toilet and feed)  Outcome: Progressing     Problem: Infection - Standard  Goal: Patient will remain free from infection  Outcome: Progressing  Flowsheets (Taken  9/24/2024 6512)  Standard Infection Interventions:   Assessed for signs and symptoms of infection   Implemented standard precautions   Instructed patient/family on signs and symptoms of infection   Provided education on proper hand hygiene and infection prevention measures   Assessed for removal IV, central lines, intra-arterial or urinary catheters     Problem: Wound/ / Incision Healing  Goal: Patient's wound/surgical incision will decrease in size and heals properly  Outcome: Progressing

## 2024-09-26 NOTE — PROGRESS NOTES
Bedside report received from BJ Walsh. Patient resting in bed. Call bell within reach, bed alarm on and in place. All belongings within reach for patient. No further needs at this time.

## 2024-09-26 NOTE — PROGRESS NOTES
Neurosurgery Progress Note    Subjective:    Pt awake, alert  C/o post neck pain  Left hand numbness similar to preop  No headache  Voiding, no bm yet  Abd distension       Exam:  AAOX4  Str 5/5  Inc c/d intact with dressing - hv out yest      BP  Min: 127/74  Max: 150/77  Pulse  Av  Min: 67  Max: 89  Resp  Av.6  Min: 16  Max: 18  Temp  Av.6 °C (99.6 °F)  Min: 36.8 °C (98.2 °F)  Max: 38.2 °C (100.8 °F)  SpO2  Av.3 %  Min: 90 %  Max: 94 %    No data recorded    Recent Labs     24   WBC 10.5 12.8*   RBC 4.67* 4.21*   HEMOGLOBIN 14.1 12.7*   HEMATOCRIT 41.4* 39.2*   MCV 88.7 93.1   MCH 30.2 30.2   MCHC 34.1 32.4   RDW 42.5 47.2   PLATELETCT 166 163*   MPV 10.1 10.3     Recent Labs     24   SODIUM 140 144   POTASSIUM 4.4 4.8   CHLORIDE 103 107   CO2 19* 24   GLUCOSE 229* 115*   BUN 14 16   CREATININE 0.94 0.86   CALCIUM 8.9 8.8               Intake/Output                         24 - 24 - 24 0659     1900-0659 Total  Total                 Intake    Total Intake -- -- -- -- -- --       Output    Urine  140  -- 140  --  -- --    Number of Times Voided 2 x 1 x 3 x -- -- --    Urine Void (mL) 140 -- 140 -- -- --    Stool  --  -- --  --  -- --    Number of Times Stooled 0 x -- 0 x -- -- --    Total Output 140 -- 140 -- -- --       Net I/O     -140 -- -140 -- -- --              Intake/Output Summary (Last 24 hours) at 2024 0839  Last data filed at 2024 1100  Gross per 24 hour   Intake --   Output 100 ml   Net -100 ml       $ Bladder Scan Results (mL): 252     acetaminophen  650 mg Q4HRS PRN    Or    HYDROcodone-acetaminophen  1 Tablet Q4HRS PRN    Or    HYDROcodone/acetaminophen  1 Tablet Q4HRS PRN    Or    morphine injection  2 mg Q4HRS PRN    scopolamine  1 Patch Q72HRS    prochlorperazine  10 mg Q6HRS PRN    amLODIPine  10 mg DAILY    carvedilol  6.25 mg BID WITH MEALS     fluticasone  2 Puff BID    insulin GLARGINE  26 Units Q EVENING    lisinopril  40 mg Q EVENING    metFORMIN  500 mg BID WITH MEALS    rosuvastatin  40 mg DAILY    tiotropium  5 mcg DAILY    Pharmacy Consult Request  1 Each PHARMACY TO DOSE    MD ALERT...DO NOT ADMINISTER NSAIDS or ASPIRIN unless ORDERED By Neurosurgery  1 Each PRN    docusate sodium  100 mg BID    senna-docusate  1 Tablet Nightly    senna-docusate  1 Tablet Q24HRS PRN    polyethylene glycol/lytes  1 Packet BID PRN    magnesium hydroxide  30 mL QDAY PRN    bisacodyl  10 mg Q24HRS PRN    sodium phosphate  1 Each Once PRN    0.9 % NaCl with KCl 20 mEq 1,000 mL   Continuous    diphenhydrAMINE  25 mg Q6HRS PRN    Or    diphenhydrAMINE  25 mg Q6HRS PRN    ondansetron  4 mg Q4HRS PRN    ondansetron  4 mg Q4HRS PRN    tizanidine  4 mg TID PRN    labetalol  10 mg Q HOUR PRN    hydrALAZINE  10 mg Q HOUR PRN    omeprazole  20 mg BID    Nozin nasal  swab  1 Applicator BID       Assessment and Plan:  Hospital day # 4  POD# 3 post lami and ext of fusion to C2, dural tear  Chemical prophylactic DVT therapy: No  Start date/time: ambulatory     NM intact  Pain control-  PRN tylenol, PRN norco (does not eldon oxy), PRN MRs, PRN IV morphine for BT pain  HOB as tolerated   Pt/ot/amb, collar when oob  bowel/bladder protocol- give supp this am  Will check KUB for abd distension  HH ordered  Wean O2 to keep sats >90% on RA - pt educated on IS 10x/hr   ? Home next 1-2 d- not ready today

## 2024-09-26 NOTE — THERAPY
"Occupational Therapy  Daily Treatment     Patient Name: Pérez Willams  Age:  79 y.o., Sex:  male  Medical Record #: [id number]  Today's Date: 9/26/2024     Precautions  Precautions: Fall Risk, Spinal / Back Precautions , Cervical Collar    Comments: Arlette donned at EOB    Assessment    Pt was seen for OT tx pt demonstrating improving mobility and ADL participation, does require cues for safe use of fww as well as min A for collar management. Pt reports SO will be able to assist at dc. OT will continue to follow in this setting.     Plan    Treatment Plan Status: Continue Current Treatment Plan  Type of Treatment: Self Care / Activities of Daily Living, Adaptive Equipment, Therapeutic Exercises, Therapeutic Activity  Treatment Frequency: 5 Times per Week  Treatment Duration: Until Therapy Goals Met    DC Equipment Recommendations: None  Discharge Recommendations: Recommend home health for continued occupational therapy services    Subjective    \"I am going home soon\"      Objective       09/26/24 1010   Time In/Time Out   Therapy Start Time 0937   Therapy End Time 1010   Total Therapy Time 33   Treatment Charges   Charges Yes   OT Self Care / ADL (Units) 2   Total Time Spent   OT Time Spent Yes   OT Self Care / ADL (Minutes) 33   OT Total Time Spent (Calculated) 33   Precautions   Precautions Fall Risk;Spinal / Back Precautions ;Cervical Collar     Comments Arlette donned at EOB   Pain 0 - 10 Group   Location Neck   Location Orientation Mid   Therapist Pain Assessment Prior to Activity;During Activity;Nurse Notified;4   Cognition    Cognition / Consciousness WDL   Level of Consciousness Alert   Other Treatments   Other Treatments Provided Reviewed collar mamangement safe use of fww, LB dressing and home safety   Balance   Sitting Balance (Static) Fair +   Sitting Balance (Dynamic) Fair   Standing Balance (Static) Fair   Standing Balance (Dynamic) Fair -   Weight Shift Sitting Fair   Weight Shift Standing Fair "   Skilled Intervention Verbal Cuing   Comments w/fww   Bed Mobility    Supine to Sit Standby Assist   Skilled Intervention Verbal Cuing;Tactile Cuing;Facilitation   Activities of Daily Living   Grooming Contact Guard Assist;Standing   Upper Body Dressing Minimal Assist   Lower Body Dressing Minimal Assist   Toileting Standby Assist   Skilled Intervention Verbal Cuing;Tactile Cuing;Facilitation;Compensatory Strategies   Comments donned bottoms EOB, stood at sink for grooming and stood at toilet to urinate pt did tend to close his eyes while at toilet reviewed safety strategies   How much help from another person does the patient currently need...   Putting on and taking off regular lower body clothing? 3   Bathing (including washing, rinsing, and drying)? 3   Toileting, which includes using a toilet, bedpan, or urinal? 3   Putting on and taking off regular upper body clothing? 3   Taking care of personal grooming such as brushing teeth? 4   Eating meals? 4   6 Clicks Daily Activity Score 20   Functional Mobility   Sit to Stand Standby Assist   Bed, Chair, Wheelchair Transfer Standby Assist   Toilet Transfers Standby Assist   Mobility walking in room w/fww   Activity Tolerance   Comments no overt c/o pain or fatigue   Patient / Family Goals   Patient / Family Goal #1 home ASAP   Goal #1 Outcome Progressing as expected   Short Term Goals   Short Term Goal # 1 pt will complete toilet transfer at SPV level   Goal Outcome # 1 Progressing as expected   Short Term Goal # 2 pt will complete toileting ADL at SPV level   Goal Outcome # 2 Progressing as expected   Short Term Goal # 3 pt will complete standing grooming while maintaining cervical spine pxns without cues at SPV level   Goal Outcome # 3 Progressing as expected   Short Term Goal # 4 pt will complete LB dress with AE PRN at SPV level   Goal Outcome # 4 Progressing as expected   Education Group   Role of Occupational Therapist Patient Response  Patient;Acceptance;Explanation;Verbal Demonstration   Spinal Precautions Patient Response Patient;Acceptance;Explanation;Verbal Demonstration;Action Demonstration;Reinforcement Needed   Brace Wear & Care Patient Response Patient;Acceptance;Explanation;Verbal Demonstration;Action Demonstration;Reinforcement Needed   Home Safety Patient Response Patient;Acceptance;Explanation;Verbal Demonstration   Transfers Patient Response Patient;Acceptance;Explanation;Verbal Demonstration;Action Demonstration;Reinforcement Needed   ADL Patient Response Patient;Acceptance;Explanation;Verbal Demonstration;Action Demonstration;Reinforcement Needed   Occupational Therapy Treatment Plan    O.T. Treatment Plan Continue Current Treatment Plan   Anticipated Discharge Equipment and Recommendations   DC Equipment Recommendations None   Discharge Recommendations Recommend home health for continued occupational therapy services   Interdisciplinary Plan of Care Collaboration   IDT Collaboration with  Nursing   Patient Position at End of Therapy Seated;Chair Alarm On;Call Light within Reach;Tray Table within Reach;Phone within Reach   Collaboration Comments RN aware of OT tx and pts efforts   Session Information   Date / Session Number  9/26 #3 (3/5, 9/30)

## 2024-09-26 NOTE — CARE PLAN
The patient is Stable - Low risk of patient condition declining or worsening    Shift Goals  Clinical Goals: patient will maintain hemodynamic adn neurologic stability rhough shift  Patient Goals: pain control  Family Goals: updated on poc    Progress made toward(s) clinical / shift goals:      Problem: Skin Integrity  Goal: Skin integrity is maintained or improved  Outcome: Progressing  Note: Dressing to posterior neck c/d/I with some minimal shadowing. Skin otherwise intact.      Problem: Pain - Standard  Goal: Alleviation of pain or a reduction in pain to the patient’s comfort goal  Outcome: Progressing  Note: Pain well controlled with q4 norco.      Problem: Respiratory  Goal: Patient will achieve/maintain optimum respiratory ventilation and gas exchange  Outcome: Not Progressing  Note: Unable to tolerate O2 titration - 86% while ambulating on room air. Requiring 1LNC. Good use of IS - 2200. Freq ambulation and up for meals.        Patient is not progressing towards the following goals:      Problem: Respiratory  Goal: Patient will achieve/maintain optimum respiratory ventilation and gas exchange  Outcome: Not Progressing  Note: Unable to tolerate O2 titration - 86% while ambulating on room air. Requiring 1LNC. Good use of IS - 2200. Freq ambulation and up for meals.          Monitor output closely - straight cath x1 for retention. BM x1 s/p suppository and relistor. XR showed possible mild ileus.

## 2024-09-26 NOTE — THERAPY
Physical Therapy   Daily Treatment     Patient Name: Pérez Willams  Age:  79 y.o., Sex:  male  Medical Record #: [id number]  Today's Date: 9/26/2024     Precautions  Precautions: Fall Risk;Spinal / Back Precautions ;Cervical Collar    Comments: Calhoun donned at EOB    Assessment  PT received pt sitting in chair with Aspen collar on. Pt was receptive to therapy session and performed a sit to stand with CGA using FWW. Pt demonstrated ability to ambulate 50ft x2 with FWW and MIN assist requiring verbal cues to maintain walking in a straight line. During ambulation pt exhibited a shuffled gait pattern with increase base of support. At the end of the session pt returned back to bed and required an increase of O2 via nasal cannula to 1L due to a drop in O2 levels post ambulation. Pt will continue to benefit from acute therapy rehabilitation until goals are met for dc.      Plan    Treatment Plan Status: Continue Current Treatment Plan  Type of Treatment: Bed Mobility, Gait Training, Neuro Re-Education / Balance, Stair Training, Therapeutic Activities, Therapeutic Exercise, Equipment, Family / Caregiver Training, Self Care / Home Evaluation  Treatment Frequency: 5 Times per Week  Treatment Duration: Until Therapy Goals Met    DC Equipment Recommendations: Front-Wheel Walker  Discharge Recommendations: Recommend home health for continued physical therapy services         09/26/24 1035    Services   Is patient using  services for this encounter? No   Precautions   Precautions Fall Risk;Spinal / Back Precautions ;Cervical Collar     Comments Calhoun donned at EOB   Vitals   O2 (LPM) 0.5   O2 Delivery Device Silicone Nasal Cannula   Pain 0 - 10 Group   Therapist Pain Assessment During Activity;1;Nurse Notified   Cognition    Cognition / Consciousness WDL   Level of Consciousness Alert   Active ROM Lower Body    Active ROM Lower Body  WDL   Strength Lower Body   Lower Body Strength  WDL   Sensation  Lower Body   Lower Extremity Sensation   WDL   Balance   Sitting Balance (Static) Fair +   Sitting Balance (Dynamic) Fair +   Standing Balance (Static) Fair -   Standing Balance (Dynamic) Fair -   Weight Shift Sitting Good   Weight Shift Standing Fair   Skilled Intervention Verbal Cuing;Sequencing   Comments FWW   Bed Mobility    Sit to Supine Supervised   Scooting Supervised   Rolling Supervised   Skilled Intervention Verbal Cuing   Comments HOB elevated   Gait Analysis   Gait Level Of Assist Minimal Assist   Assistive Device Front Wheel Walker   Distance (Feet) 50   # of Times Distance was Traveled 2   Deviation Shuffled Gait;Increased Base Of Support   Skilled Intervention Verbal Cuing   Comments pt states pain in c/s when ambulating   Functional Mobility   Sit to Stand Contact Guard Assist   Bed, Chair, Wheelchair Transfer Contact Guard Assist   Toilet Transfers Contact Guard Assist   Transfer Method Stand Step   Mobility FWW   Skilled Intervention Verbal Cuing;Sequencing   6 Clicks Assessment - How much HELP from from another person do you currently need... (If the patient hasn't done an activity recently, how much help from another person do you think he/she would need if he/she tried?)   Turning from your back to your side while in a flat bed without using bedrails? 3   Moving from lying on your back to sitting on the side of a flat bed without using bedrails? 3   Moving to and from a bed to a chair (including a wheelchair)? 3   Standing up from a chair using your arms (e.g., wheelchair, or bedside chair)? 3   Walking in hospital room? 3   Climbing 3-5 steps with a railing? 2   6 clicks Mobility Score 17   Patient / Family Goals    Patient / Family Goal #1 Return home.   Short Term Goals    Short Term Goal # 1 pt will be able to complete supine<>sitting from flat bed with SPV in 6tx in order to progress to home   Goal Outcome # 1 Progressing as expected   Short Term Goal # 2 pt will be able to complete STS  with FWW and SPV in 6tx in order to decrease fall risk   Goal Outcome # 2 Goal met   Short Term Goal # 3 pt will be able to ambulate 150ft with FWW and SPV in 6tx in order to return to household gait   Goal Outcome # 3 Goal not met   Short Term Goal # 4 pt will be able to negotiate 2 steps with SPV in 6tx in order to enter and exit home   Goal Outcome # 4 Goal not met   Education Group   Education Provided Role of Physical Therapist;Brace Wear and Care;Gait Training;Cervical Precautions   Cervical Precautions Patient Response Patient;Acceptance;Explanation;Verbal Demonstration   Role of Physical Therapist Patient Response Patient;Acceptance;Explanation;Verbal Demonstration   Gait Training Patient Response Patient;Acceptance;Explanation;Verbal Demonstration   Use of Assistive Device Patient Response Patient;Acceptance;Explanation;Verbal Demonstration   Brace Wear & Care Patient Response Patient;Acceptance;Explanation;Verbal Demonstration   Physical Therapy Treatment Plan   Physical Therapy Treatment Plan Continue Current Treatment Plan   Treatment Plan  Bed Mobility;Gait Training;Neuro Re-Education / Balance;Stair Training;Therapeutic Activities;Therapeutic Exercise;Equipment;Family / Caregiver Training;Self Care / Home Evaluation   Treatment Frequency 5 Times per Week   Duration Until Therapy Goals Met   Anticipated Discharge Equipment and Recommendations   DC Equipment Recommendations Front-Wheel Walker   Discharge Recommendations Recommend home health for continued physical therapy services   Interdisciplinary Plan of Care Collaboration   IDT Collaboration with  Nursing   Patient Position at End of Therapy In Bed;Call Light within Reach;Tray Table within Reach;Phone within Reach;Bed Alarm On   Collaboration Comments RN updated   Session Information   Date / Session Number  9/26-2(2/5,9/30)

## 2024-09-26 NOTE — DISCHARGE PLANNING
Choice obtained for and accepted with [facility]. Patient reports owning 4WW which is in storage. Notified patient therapy recommending FWW upon discharge home, he ultimately decided to provide choice for DME/FWW: 1. [facility]. Writer notified RN to obtain through traction.   Neurosurgeon anticipating discharge tomorrow, 9/27/2024, via transportation from daughter.   Pending BM.     Case Management Discharge Planning    Admission Date: 9/23/2024  GMLOS: 2.6  ALOS: 3    6-Clicks ADL Score: 20  6-Clicks Mobility Score: 17  PT and/or OT Eval ordered: Yes  Post-acute Referrals Ordered: Yes  Post-acute Choice Obtained: Yes  Has referral(s) been sent to post-acute provider:  Yes      Anticipated Discharge Dispo: Discharge Disposition: D/T to home under A care in anticipation of covered skilled care (06)  Discharge Address: [address] [place] [address]  Discharge Contact Phone Number: [phone]    DME Needed: Yes    DME Ordered: Yes    Action(s) Taken: Updated Provider/Nurse on Discharge Plan, Patient Conference, and DME Face to Face     Escalations Completed: None    Medically Clear: No    Next Steps: Pending EMILY CRISTOBAL continuing bladder protocol.     Barriers to Discharge: Medical clearance    Is the patient up for discharge tomorrow: No, Tomorrow, 9/27/2024, via transportation from daughter.

## 2024-09-27 LAB
GLUCOSE BLD STRIP.AUTO-MCNC: 106 MG/DL (ref 65–99)
GLUCOSE BLD STRIP.AUTO-MCNC: 80 MG/DL (ref 65–99)

## 2024-09-27 PROCEDURE — 97116 GAIT TRAINING THERAPY: CPT

## 2024-09-27 PROCEDURE — A9270 NON-COVERED ITEM OR SERVICE: HCPCS | Performed by: NURSE PRACTITIONER

## 2024-09-27 PROCEDURE — 700102 HCHG RX REV CODE 250 W/ 637 OVERRIDE(OP): Performed by: NURSE PRACTITIONER

## 2024-09-27 PROCEDURE — RXMED WILLOW AMBULATORY MEDICATION CHARGE: Performed by: NURSE PRACTITIONER

## 2024-09-27 PROCEDURE — 82962 GLUCOSE BLOOD TEST: CPT

## 2024-09-27 PROCEDURE — 97530 THERAPEUTIC ACTIVITIES: CPT

## 2024-09-27 PROCEDURE — 700111 HCHG RX REV CODE 636 W/ 250 OVERRIDE (IP): Mod: JZ | Performed by: NURSE PRACTITIONER

## 2024-09-27 PROCEDURE — 51798 US URINE CAPACITY MEASURE: CPT

## 2024-09-27 PROCEDURE — A9270 NON-COVERED ITEM OR SERVICE: HCPCS | Performed by: CLINICAL NURSE SPECIALIST

## 2024-09-27 PROCEDURE — 700102 HCHG RX REV CODE 250 W/ 637 OVERRIDE(OP): Performed by: CLINICAL NURSE SPECIALIST

## 2024-09-27 PROCEDURE — 700101 HCHG RX REV CODE 250: Performed by: NURSE PRACTITIONER

## 2024-09-27 PROCEDURE — 770001 HCHG ROOM/CARE - MED/SURG/GYN PRIV*

## 2024-09-27 RX ORDER — PSEUDOEPHEDRINE HCL 30 MG
100 TABLET ORAL 2 TIMES DAILY
COMMUNITY
Start: 2024-09-27

## 2024-09-27 RX ORDER — BISACODYL 10 MG
10 SUPPOSITORY, RECTAL RECTAL
COMMUNITY
Start: 2024-09-27

## 2024-09-27 RX ORDER — METOCLOPRAMIDE HYDROCHLORIDE 5 MG/ML
10 INJECTION INTRAMUSCULAR; INTRAVENOUS EVERY 6 HOURS
Status: COMPLETED | OUTPATIENT
Start: 2024-09-27 | End: 2024-09-28

## 2024-09-27 RX ORDER — ACETAMINOPHEN 325 MG/1
650 TABLET ORAL EVERY 4 HOURS PRN
COMMUNITY
Start: 2024-09-27

## 2024-09-27 RX ADMIN — LISINOPRIL 40 MG: 20 TABLET ORAL at 16:36

## 2024-09-27 RX ADMIN — HYDROCODONE BITARTRATE AND ACETAMINOPHEN 1 TABLET: 5; 325 TABLET ORAL at 12:31

## 2024-09-27 RX ADMIN — INSULIN GLARGINE-YFGN 26 UNITS: 100 INJECTION, SOLUTION SUBCUTANEOUS at 16:42

## 2024-09-27 RX ADMIN — METOCLOPRAMIDE 10 MG: 5 INJECTION, SOLUTION INTRAMUSCULAR; INTRAVENOUS at 14:39

## 2024-09-27 RX ADMIN — FLUTICASONE PROPIONATE 88 MCG: 44 AEROSOL, METERED RESPIRATORY (INHALATION) at 04:54

## 2024-09-27 RX ADMIN — METOCLOPRAMIDE 10 MG: 5 INJECTION, SOLUTION INTRAMUSCULAR; INTRAVENOUS at 08:28

## 2024-09-27 RX ADMIN — AMLODIPINE BESYLATE 10 MG: 10 TABLET ORAL at 04:47

## 2024-09-27 RX ADMIN — METOCLOPRAMIDE 10 MG: 5 INJECTION, SOLUTION INTRAMUSCULAR; INTRAVENOUS at 21:33

## 2024-09-27 RX ADMIN — METHYLNALTREXONE BROMIDE 12 MG: 12 INJECTION, SOLUTION SUBCUTANEOUS at 12:00

## 2024-09-27 RX ADMIN — POTASSIUM CHLORIDE AND SODIUM CHLORIDE: 900; 150 INJECTION, SOLUTION INTRAVENOUS at 01:51

## 2024-09-27 RX ADMIN — ONDANSETRON 4 MG: 2 INJECTION INTRAMUSCULAR; INTRAVENOUS at 03:36

## 2024-09-27 RX ADMIN — Medication 1 APPLICATOR: at 04:46

## 2024-09-27 RX ADMIN — CARVEDILOL 6.25 MG: 6.25 TABLET, FILM COATED ORAL at 08:16

## 2024-09-27 RX ADMIN — POTASSIUM CHLORIDE AND SODIUM CHLORIDE: 900; 150 INJECTION, SOLUTION INTRAVENOUS at 23:22

## 2024-09-27 RX ADMIN — DOCUSATE SODIUM 100 MG: 100 CAPSULE, LIQUID FILLED ORAL at 16:36

## 2024-09-27 RX ADMIN — OMEPRAZOLE 20 MG: 20 CAPSULE, DELAYED RELEASE ORAL at 16:36

## 2024-09-27 RX ADMIN — HYDROCODONE BITARTRATE AND ACETAMINOPHEN 1 TABLET: 5; 325 TABLET ORAL at 16:45

## 2024-09-27 RX ADMIN — OMEPRAZOLE 20 MG: 20 CAPSULE, DELAYED RELEASE ORAL at 04:46

## 2024-09-27 RX ADMIN — ROSUVASTATIN CALCIUM 40 MG: 20 TABLET, FILM COATED ORAL at 04:47

## 2024-09-27 RX ADMIN — DOCUSATE SODIUM 100 MG: 100 CAPSULE, LIQUID FILLED ORAL at 04:46

## 2024-09-27 RX ADMIN — BISACODYL 10 MG: 10 SUPPOSITORY RECTAL at 08:16

## 2024-09-27 RX ADMIN — HYDROCODONE BITARTRATE AND ACETAMINOPHEN 1 TABLET: 10; 325 TABLET ORAL at 01:53

## 2024-09-27 RX ADMIN — HYDROCODONE BITARTRATE AND ACETAMINOPHEN 1 TABLET: 5; 325 TABLET ORAL at 21:34

## 2024-09-27 RX ADMIN — METFORMIN HYDROCHLORIDE 500 MG: 500 TABLET ORAL at 08:16

## 2024-09-27 RX ADMIN — TIOTROPIUM BROMIDE INHALATION SPRAY 5 MCG: 3.12 SPRAY, METERED RESPIRATORY (INHALATION) at 04:54

## 2024-09-27 RX ADMIN — METFORMIN HYDROCHLORIDE 500 MG: 500 TABLET ORAL at 16:35

## 2024-09-27 RX ADMIN — SENNOSIDES AND DOCUSATE SODIUM 1 TABLET: 50; 8.6 TABLET ORAL at 21:33

## 2024-09-27 RX ADMIN — FLUTICASONE PROPIONATE 88 MCG: 44 AEROSOL, METERED RESPIRATORY (INHALATION) at 16:36

## 2024-09-27 RX ADMIN — CARVEDILOL 6.25 MG: 6.25 TABLET, FILM COATED ORAL at 16:36

## 2024-09-27 RX ADMIN — SCOPOLAMINE 1 PATCH: 1.5 PATCH, EXTENDED RELEASE TRANSDERMAL at 11:58

## 2024-09-27 RX ADMIN — HYDROCODONE BITARTRATE AND ACETAMINOPHEN 1 TABLET: 10; 325 TABLET ORAL at 08:16

## 2024-09-27 ASSESSMENT — PAIN DESCRIPTION - PAIN TYPE
TYPE: SURGICAL PAIN
TYPE: SURGICAL PAIN
TYPE: ACUTE PAIN;SURGICAL PAIN

## 2024-09-27 ASSESSMENT — COGNITIVE AND FUNCTIONAL STATUS - GENERAL
STANDING UP FROM CHAIR USING ARMS: A LITTLE
CLIMB 3 TO 5 STEPS WITH RAILING: A LITTLE
SUGGESTED CMS G CODE MODIFIER MOBILITY: CK
TURNING FROM BACK TO SIDE WHILE IN FLAT BAD: A LITTLE
MOVING TO AND FROM BED TO CHAIR: A LITTLE
WALKING IN HOSPITAL ROOM: A LITTLE
MOBILITY SCORE: 18
MOVING FROM LYING ON BACK TO SITTING ON SIDE OF FLAT BED: A LITTLE

## 2024-09-27 ASSESSMENT — GAIT ASSESSMENTS
DEVIATION: BRADYKINETIC;INCREASED BASE OF SUPPORT
ASSISTIVE DEVICE: FRONT WHEEL WALKER
GAIT LEVEL OF ASSIST: CONTACT GUARD ASSIST
DISTANCE (FEET): 150

## 2024-09-27 NOTE — PROGRESS NOTES
Virtual Nurse rounding complete.  Rounding Needs: Patient resting comfortably with no needs at this time. Patient would like pain medications when available; bedside RN notified and aware.

## 2024-09-27 NOTE — THERAPY
"Physical Therapy   Discharge     Patient Name: Pérez Willams  Age:  79 y.o., Sex:  male  Medical Record #: [id number]  Today's Date: 9/27/2024     Precautions  Precautions: Fall Risk;Spinal / Back Precautions ;Cervical Collar    Comments: C-collar donned at EOB    Assessment    Patient seen for follow up PT treatment session and demos improved balance and independence with mobility.  He was educated as detailed below and was able to navigate the stairs today.  No further acute care PT needs at this time. He should continue to mobilize with nursing staff and will most likely need a Home O2 evaluation.     Plan    Reason for Discharge From Therapy: Discharge Secondary to Goals Met    DC Equipment Recommendations: Front-Wheel Walker  Discharge Recommendations: Recommend home health for continued physical therapy services      Subjective    \"I've been up already today\"    Objective     09/27/24 1030   Precautions   Precautions Fall Risk;Spinal / Back Precautions ;Cervical Collar     Comments C-collar donned at EOB   Vitals   Pulse Oximetry (!) 83 %   O2 Delivery Device None - Room Air   Vitals Comments desatting to 83% on RA with activity, RN and MD notified   Pain 0 - 10 Group   Location Neck   Location Orientation Posterior   Pain Rating Scale (NPRS) 4   Therapist Pain Assessment During Activity;4;Nurse Notified   Cognition    Cognition / Consciousness WDL   Level of Consciousness Alert   Comments pleasant and cooperative with some improved insight into deficits today   Sitting Lower Body Exercises   Sitting Lower Body Exercises Yes   Ankle Pumps 1 set of 10   Hip Flexion 1 set of 10   Long Arc Quad 1 set of 10   Neuro-Muscular Treatments   Neuro-Muscular Treatments Compensatory Strategies;Anterior weight shift;Verbal Cuing;Weight Shift Right;Weight Shift Left;Postural Facilitation   Other Treatments   Other Treatments Provided reviewed collar management and the log roll.  Patient educated about importance of " IS for pulmonary hygeine and the pathologies of bed rest.   Balance   Sitting Balance (Static) Fair +   Sitting Balance (Dynamic) Fair +   Standing Balance (Static) Fair   Standing Balance (Dynamic) Fair -   Weight Shift Sitting Fair   Weight Shift Standing Fair   Skilled Intervention Postural Facilitation;Compensatory Strategies;Tactile Cuing;Verbal Cuing;Sequencing   Comments w/FWW   Bed Mobility    Supine to Sit Supervised   Scooting Supervised   Skilled Intervention Sequencing;Tactile Cuing;Verbal Cuing   Comments VC for log roll   Gait Analysis   Gait Level Of Assist Contact Guard Assist   Assistive Device Front Wheel Walker   Distance (Feet) 150   # of Times Distance was Traveled 1   Deviation Bradykinetic;Increased Base Of Support   # of Stairs Climbed 10   Level of Assist with Stairs Contact Guard Assist   Skilled Intervention Sequencing;Tactile Cuing;Verbal Cuing   Comments cueing for sequencing on stairs and slowing down with descending stairs 2/2 neck brace limiting vision   Functional Mobility   Sit to Stand Contact Guard Assist   Bed, Chair, Wheelchair Transfer Contact Guard Assist   Transfer Method Stand Step   Mobility Bed>gait>stairs>chair   Skilled Intervention Compensatory Strategies;Tactile Cuing;Sequencing;Verbal Cuing   6 Clicks Assessment - How much HELP from from another person do you currently need... (If the patient hasn't done an activity recently, how much help from another person do you think he/she would need if he/she tried?)   Turning from your back to your side while in a flat bed without using bedrails? 3   Moving from lying on your back to sitting on the side of a flat bed without using bedrails? 3   Moving to and from a bed to a chair (including a wheelchair)? 3   Standing up from a chair using your arms (e.g., wheelchair, or bedside chair)? 3   Walking in hospital room? 3   Climbing 3-5 steps with a railing? 3   6 clicks Mobility Score 18   Patient / Family Goals    Patient /  Family Goal #1 Return home.   Goal #1 Outcome Progressing as expected   Short Term Goals    Short Term Goal # 1 pt will be able to complete supine<>sitting from flat bed with SPV in 6tx in order to progress to home   Goal Outcome # 1 Progressing as expected   Short Term Goal # 2 pt will be able to complete STS with FWW and SPV in 6tx in order to decrease fall risk   Goal Outcome # 2 Goal met   Short Term Goal # 3 pt will be able to ambulate 150ft with FWW and SPV in 6tx in order to return to household gait   Goal Outcome # 3 Goal met   Short Term Goal # 4 pt will be able to negotiate 2 steps with SPV in 6tx in order to enter and exit home   Goal Outcome # 4 Goal met   Physical Therapy Treatment Plan   Physical Therapy Treatment Plan Modify Current Treatment Plan   Reason For Discharge Discharge Secondary to Goals Met   Anticipated Discharge Equipment and Recommendations   DC Equipment Recommendations Front-Wheel Walker   Discharge Recommendations Recommend home health for continued physical therapy services     Mehnaz Jimenez, PT, DPT, GCS

## 2024-09-27 NOTE — DISCHARGE INSTRUCTIONS
Follow up with YODIT at [facility] in 2 weeks [phone]  Follow up with Dr. Peralta in 6 weeks  No pushing, pulling, lifting greater than 10 pounds  No repetitive motion above head  Ok to shower, pat incision dry. No bath tubs, hot tubs, pools, etc. No ointments or creams on incision   No non-steroidal anti-inflammatory medications or aspirin until cleared by Dr. Peralta  Ambulate as much is comfortable  No driving for at least 2 weeks following surgery or until cleared  Obtain over the counter senekot take 1-2 tablets daily while taking narcotic pain medication  Do not return to work until cleared by physician  Wear brace when out of bed

## 2024-09-27 NOTE — CARE PLAN
Problem: Pain - Standard  Goal: Alleviation of pain or a reduction in pain to the patient’s comfort goal  Outcome: Progressing     Problem: Communication  Goal: The ability to communicate needs accurately and effectively will improve  Outcome: Progressing     Problem: Bowel Elimination  Goal: Establish and maintain regular bowel function  Outcome: Progressing       The patient is Stable - Low risk of patient condition declining or worsening    Shift Goals  Clinical Goals: pain control, mobility, safety, neuro checks, BM  Patient Goals: rest, comfort  Family Goals: not present    Progress made toward(s) clinical / shift goals:  Taught pt 0-10 pain scale and non-pharmacological method of pain management, encouraged to verbalize when in pain. Administered PRN pain meds as needed. Mobilizing SBA with FWW, tolerates well. Neuro checks per orders. Bowel protocol in place. (+) BM during shift. Bed locked and in lowest position. Bed alarm on. Safety and fall precautions in place. Hourly rounding. Call light and belongings within reach.     Patient is not progressing towards the following goals:

## 2024-09-27 NOTE — DISCHARGE PLANNING
Choice obtained for and accepted with [facility].   FWW delivered to bedside by traction on 9/26/2024.   KUB showed Ileus which provider is currently treating (Reglan, suppository, continuing Relistor today).   If medically cleared tomorrow, 9/28/2024, then will discharge home with transportation from family.     Case Management Discharge Planning    Admission Date: 9/23/2024  GMLOS: 2.6  ALOS: 4    6-Clicks ADL Score: 20  6-Clicks Mobility Score: 18      Anticipated Discharge Dispo: Discharge Disposition: D/T to home under A care in anticipation of covered skilled care (06)  Discharge Address: [address] [place] [address]  Discharge Contact Phone Number: [phone]    DME Needed: Yes    DME Ordered: Yes    Action(s) Taken: Updated Provider/Nurse on Discharge Plan    Escalations Completed: PT    Medically Clear: No    Next Steps: Pending resolution of Ileus.     Barriers to Discharge: Medical clearance    Is the patient up for discharge tomorrow: Hopefully on Saturday, 9/28/2024.

## 2024-09-27 NOTE — THERAPY
"Occupational Therapy Contact Note    Patient Name: Pérez Willams  Age:  79 y.o., Sex:  male  Medical Record #: [id number]  Today's Date: 9/27/2024    Discussed missed therapy with RN. OT attempted tx X2 today in late AM and this PM. Pt adamantly refused due to \"severe pain in my head and neck\". Nursing informed and stated to OT that pt has had his pain meds. Pt stated \"meds are not working\". Will attempt this later PM as able. RN informed. OT will continue to follow.        09/27/24 1317   Treatment Variance   Reason For Missed Therapy Medical - Patient not Able To Participate  (Attempted OT tx X's 2. Pt not able to participate due to pain.)   Interdisciplinary Plan of Care Collaboration   IDT Collaboration with  Nursing   Collaboration Comments RN informed of OT attempt to see pt X2 today.Pt declined due to \"severe pain in my neck and my head\". Pain meds given and \"not working\" per pt.   Session Information   Date / Session Number  9/26 #3 (3/5, 9/30) Attempted X2 today 9/27. Pt declined due to severe pain in neck and head.       "

## 2024-09-27 NOTE — CARE PLAN
The patient is Stable - Low risk of patient condition declining or worsening    Shift Goals  Clinical Goals: pain control, mobility, safety, voiding, neuro checks  Patient Goals: pain control, comfort  Family Goals: not present    Problem: Respiratory  Goal: Patient will achieve/maintain optimum respiratory ventilation and gas exchange  Description: Target End Date:  Prior to discharge or change in level of care  1.  Assess and monitor rate, rhythm, depth and effort of respiration  2.  Breath sounds assessed qshift and/or as needed  3.  Assess O2 saturation, administer/titrate oxygen as ordered  4.  Position patient for maximum ventilatory efficiency  5.  Turn, cough, and deep breath with splinting to improve effectiveness  6.  Encourage use of incentive spirometer and encourage patient to cough after use and utilize splinting techniques if applicable  Outcome: Progressing     Problem: Mobility  Goal: Patient's capacity to carry out activities will improve  Description: Target End Date:  Prior to discharge or change in level of care  1.  Assess for barriers to mobility/activity  2.  Implement activity per interdisciplinary team recommendations  3.  Target activity level identified and patient/family/caregiver aware of goal  4.  Provide assistive devices  5.  Instruct patient/caregiver on proper use of assistive/adaptive devices  6.  Schedule activities and rest periods to decrease effects of fatigue  7.  Encourage mobilization to extent of ability  8.  Maintain proper body alignment  9.  Provide adequate pain management to allow progressive mobilization  10. Implement pace maker precautions as needed  Outcome: Progressing     Problem: Pain - Standard  Goal: Alleviation of pain or a reduction in pain to the patient’s comfort goal  Description: Target End Date:  Prior to discharge or change in level of care  1.  Document pain using the appropriate pain scale per order or unit policy  2.  Educate and implement  non-pharmacologic comfort measures (i.e. relaxation, distraction, massage, cold/heat therapy, etc.)  3.  Pain management medications as ordered  4.  Reassess pain after pain med administration per policy  5.  If opiods administered assess patient's response to pain medication is appropriate per POSS sedation scale  Outcome: Progressing

## 2024-09-27 NOTE — PROGRESS NOTES
Neurosurgery Progress Note    Subjective:    Pt awake, alert  C/o post neck pain  Left hand numbness similar to preop  No headache  Voiding, + bm  Abd distension - some nausea, no vomiting       Exam:  AAOX4  Str 5/  Inc c/d intact with dressing       BP  Min: 116/74  Max: 164/86  Pulse  Av.8  Min: 66  Max: 92  Resp  Av.4  Min: 17  Max: 20  Temp  Av.3 °C (99.1 °F)  Min: 36.8 °C (98.2 °F)  Max: 37.5 °C (99.5 °F)  SpO2  Av.2 %  Min: 89 %  Max: 93 %    No data recorded    Recent Labs     24  0933   WBC 12.8* 11.2*   RBC 4.21* 4.31*   HEMOGLOBIN 12.7* 13.5*   HEMATOCRIT 39.2* 39.5*   MCV 93.1 91.6   MCH 30.2 31.3   MCHC 32.4 34.2   RDW 47.2 46.5   PLATELETCT 163* 162*   MPV 10.3 10.1     Recent Labs     24  0933   SODIUM 144 138   POTASSIUM 4.8 4.2   CHLORIDE 107 101   CO2 24 26   GLUCOSE 115* 115*   BUN 16 18   CREATININE 0.86 0.86   CALCIUM 8.8 9.0               Intake/Output                         24 07 - 24 0659 24 07 - 24 0659      Total  Total                 Intake    P.O.  150  -- 150  --  -- --    P.O. 150 -- 150 -- -- --    Total Intake 150 -- 150 -- -- --       Output    Urine  500  745 1245  340  -- 340    Number of Times Voided 5 x 3 x 8 x -- -- --    Straight Catheter 500 -- 500 -- -- --    Urine Void (mL) -- 703 745 340 -- 340    Stool  --  -- --  --  -- --    Number of Times Stooled 1 x 0 x 1 x -- -- --    Total Output  340 -- 340       Net I/O     -350 -634 -1409 -340 -- -340              Intake/Output Summary (Last 24 hours) at 2024 0800  Last data filed at 2024 0720  Gross per 24 hour   Intake 150 ml   Output 1585 ml   Net -1435 ml       $ Bladder Scan Results (mL): 292     acetaminophen  650 mg Q4HRS PRN    Or    HYDROcodone-acetaminophen  1 Tablet Q4HRS PRN    Or    HYDROcodone/acetaminophen  1 Tablet Q4HRS PRN    Or    morphine injection  2 mg Q4HRS  PRN    scopolamine  1 Patch Q72HRS    prochlorperazine  10 mg Q6HRS PRN    amLODIPine  10 mg DAILY    carvedilol  6.25 mg BID WITH MEALS    fluticasone  2 Puff BID    insulin GLARGINE  26 Units Q EVENING    lisinopril  40 mg Q EVENING    metFORMIN  500 mg BID WITH MEALS    rosuvastatin  40 mg DAILY    tiotropium  5 mcg DAILY    Pharmacy Consult Request  1 Each PHARMACY TO DOSE    MD ALERT...DO NOT ADMINISTER NSAIDS or ASPIRIN unless ORDERED By Neurosurgery  1 Each PRN    docusate sodium  100 mg BID    senna-docusate  1 Tablet Nightly    senna-docusate  1 Tablet Q24HRS PRN    polyethylene glycol/lytes  1 Packet BID PRN    magnesium hydroxide  30 mL QDAY PRN    bisacodyl  10 mg Q24HRS PRN    sodium phosphate  1 Each Once PRN    0.9 % NaCl with KCl 20 mEq 1,000 mL   Continuous    diphenhydrAMINE  25 mg Q6HRS PRN    Or    diphenhydrAMINE  25 mg Q6HRS PRN    ondansetron  4 mg Q4HRS PRN    ondansetron  4 mg Q4HRS PRN    tizanidine  4 mg TID PRN    labetalol  10 mg Q HOUR PRN    hydrALAZINE  10 mg Q HOUR PRN    omeprazole  20 mg BID    Nozin nasal  swab  1 Applicator BID       Assessment and Plan:  Hospital day # 5  POD# 4 post lami and ext of fusion to C2, dural tear  Chemical prophylactic DVT therapy: No  Start date/time: ambulatory     NM intact  Pain control-  PRN tylenol, PRN norco (does not eldon oxy), PRN MRs, PRN IV morphine for BT pain  HOB as tolerated   Pt/ot/amb, collar when oob  bowel/bladder protocol - give supp and add reglan 10 mg Q6 x24 hours. Relistor given yest, will give again today   KUB showed ? Ileus   HH ordered  Wean O2 to keep sats >90% on RA - pt educated on IS 10x/hr   ? Home tomorrow

## 2024-09-28 ENCOUNTER — PHARMACY VISIT (OUTPATIENT)
Dept: PHARMACY | Facility: MEDICAL CENTER | Age: 79
End: 2024-09-28
Payer: COMMERCIAL

## 2024-09-28 VITALS
SYSTOLIC BLOOD PRESSURE: 133 MMHG | RESPIRATION RATE: 17 BRPM | OXYGEN SATURATION: 90 % | HEIGHT: 70 IN | HEART RATE: 98 BPM | WEIGHT: 220.46 LBS | DIASTOLIC BLOOD PRESSURE: 83 MMHG | TEMPERATURE: 98.4 F | BODY MASS INDEX: 31.56 KG/M2

## 2024-09-28 PROCEDURE — 700102 HCHG RX REV CODE 250 W/ 637 OVERRIDE(OP): Performed by: NURSE PRACTITIONER

## 2024-09-28 PROCEDURE — A9270 NON-COVERED ITEM OR SERVICE: HCPCS | Performed by: CLINICAL NURSE SPECIALIST

## 2024-09-28 PROCEDURE — 700102 HCHG RX REV CODE 250 W/ 637 OVERRIDE(OP): Performed by: CLINICAL NURSE SPECIALIST

## 2024-09-28 PROCEDURE — 700111 HCHG RX REV CODE 636 W/ 250 OVERRIDE (IP): Mod: JZ | Performed by: NURSE PRACTITIONER

## 2024-09-28 PROCEDURE — A9270 NON-COVERED ITEM OR SERVICE: HCPCS | Performed by: NURSE PRACTITIONER

## 2024-09-28 RX ADMIN — ROSUVASTATIN CALCIUM 40 MG: 20 TABLET, FILM COATED ORAL at 05:57

## 2024-09-28 RX ADMIN — OMEPRAZOLE 20 MG: 20 CAPSULE, DELAYED RELEASE ORAL at 05:58

## 2024-09-28 RX ADMIN — POLYETHYLENE GLYCOL 3350 1 PACKET: 17 POWDER, FOR SOLUTION ORAL at 06:05

## 2024-09-28 RX ADMIN — CARVEDILOL 6.25 MG: 6.25 TABLET, FILM COATED ORAL at 08:06

## 2024-09-28 RX ADMIN — AMLODIPINE BESYLATE 10 MG: 10 TABLET ORAL at 05:58

## 2024-09-28 RX ADMIN — HYDROCODONE BITARTRATE AND ACETAMINOPHEN 1 TABLET: 10; 325 TABLET ORAL at 05:57

## 2024-09-28 RX ADMIN — METFORMIN HYDROCHLORIDE 500 MG: 500 TABLET ORAL at 08:06

## 2024-09-28 RX ADMIN — FLUTICASONE PROPIONATE 88 MCG: 44 AEROSOL, METERED RESPIRATORY (INHALATION) at 06:01

## 2024-09-28 RX ADMIN — TIOTROPIUM BROMIDE INHALATION SPRAY 5 MCG: 3.12 SPRAY, METERED RESPIRATORY (INHALATION) at 06:01

## 2024-09-28 RX ADMIN — HYDROCODONE BITARTRATE AND ACETAMINOPHEN 1 TABLET: 10; 325 TABLET ORAL at 01:34

## 2024-09-28 RX ADMIN — DOCUSATE SODIUM 100 MG: 100 CAPSULE, LIQUID FILLED ORAL at 05:58

## 2024-09-28 RX ADMIN — HYDROCODONE BITARTRATE AND ACETAMINOPHEN 1 TABLET: 10; 325 TABLET ORAL at 11:13

## 2024-09-28 RX ADMIN — METOCLOPRAMIDE 10 MG: 5 INJECTION, SOLUTION INTRAMUSCULAR; INTRAVENOUS at 01:34

## 2024-09-28 ASSESSMENT — PAIN DESCRIPTION - PAIN TYPE
TYPE: SURGICAL PAIN
TYPE: SURGICAL PAIN

## 2024-09-28 NOTE — CARE PLAN
Problem: Pain - Standard  Goal: Alleviation of pain or a reduction in pain to the patient’s comfort goal  Outcome: Progressing     Problem: Communication  Goal: The ability to communicate needs accurately and effectively will improve  Outcome: Progressing       The patient is Stable - Low risk of patient condition declining or worsening    Shift Goals  Clinical Goals: Pain control, incentive spirometer  Patient Goals: Pain control  Family Goals: not present    Progress made toward(s) clinical / shift goals:  Taught pt 0-10 pain scale and non-pharmacological method of pain management, encouraged to verbalize when in pain. Administered PRN pain meds as needed. Pt verbalizes and demonstrates understanding of using incentive spirometer 10 times an hour.     Patient is not progressing towards the following goals:

## 2024-09-28 NOTE — DISCHARGE SUMMARY
DATE OF ADMISSION:  09/23/2024   DATE OF DISCHARGE:  09/28/2024     ADMITTING DIAGNOSIS:  Prior C3-T1 laminectomy and fusion with instrumentation   10 years ago for myelopathy with development of C2-3 instability.     COURSE OF HOSPITALIZATION:  The patient was admitted to [facility].  On date of admission, he was brought to the operating room   where he underwent exploration of prior C3-T1 fusion, removal of rods C3-T1,   removal of the screws C5, C6, and T1, placement of new bilateral pars screws   at C2, posterolateral arthrodesis C2 through 4 bilaterally with Dr. Sukhwinder Peralta.     Postoperatively, we have been working on pain control and mobility.  Upper   extremity strength 5/5.  Incision clean, dry and intact with dressing.  His   drain has been removed.  He did develop abdominal distention yesterday. We got   a KUB that showed likely ileus.  We gave him a dose of Relistor.  We have   given him suppositories.  We also added Reglan.  We will give another dose of   Relistor today.  He is eating.  He has mild nausea, no vomiting.  He is   voiding.  He has had a bowel movement.  His labs and vital signs are stable.    We are also trying to get him off oxygen.  He is on half a liter currently. We   are increasing his incentive spirometer use and ambulation.  We have ordered   home health for home and a front-wheeled walker.  He will likely be meeting   all criteria and will be discharged tomorrow on 09/28/2024.     DISCHARGE MEDICATIONS:  1.  Norco 10/325 mg tablets 1 every 4 hours p.r.n. pain, #42, no refills.  2.  Tizanidine 4 mg 1 p.o. t.i.d. p.r.n. muscle spasms, #60, no refills.     Both these medications were sent electronically to [facility]   Meds-to-Beds.   has been reviewed, no aberrant behaviors identified.    Consent and ORT are on file.     DISCHARGE INSTRUCTIONS:  The patient was given standard postoperative   instructions.  He will follow up with our office at 2  and 6 weeks   postoperatively.  He only needs to wear his cervical collar when out of bed.    No aspirin or anti-inflammatory products until cleared.  He may shower at any   time.  Pat incision dry.  No ointments or creams on the incision.  No driving   until cleared.  No working until cleared.  No lifting greater than 10 pounds.    Any further questions or concerns, he may have before his first postoperative   appointment, he knows not to hesitate to contact us.        ______________________________  TARIQ GEIGER        ______________________________  MD NORMA Watkins/FITO    DD:  09/27/2024 10:51  DT:  09/27/2024 17:06    Job#:  [id number]

## 2024-09-28 NOTE — PROGRESS NOTES
Neurosurgery Progress Note    Subjective:    Pt awake, alert  C/o post neck pain  Left hand numbness similar to preop  No headache  Voiding, + bm  Abd better       Exam:  AAOX4  Str 5/5  Inc c/d intact with dressing       BP  Min: 133/83  Max: 153/89  Pulse  Av.2  Min: 82  Max: 98  Resp  Av  Min: 16  Max: 18  Temp  Av °C (98.6 °F)  Min: 36.8 °C (98.2 °F)  Max: 37.5 °C (99.5 °F)  SpO2  Av.5 %  Min: 83 %  Max: 94 %    No data recorded    Recent Labs     24  0933   WBC 11.2*   RBC 4.31*   HEMOGLOBIN 13.5*   HEMATOCRIT 39.5*   MCV 91.6   MCH 31.3   MCHC 34.2   RDW 46.5   PLATELETCT 162*   MPV 10.1     Recent Labs     24  0933   SODIUM 138   POTASSIUM 4.2   CHLORIDE 101   CO2 26   GLUCOSE 115*   BUN 18   CREATININE 0.86   CALCIUM 9.0               Intake/Output                         24 0700 - 24 0659 24 0700 - 24 0659     5998-1067 0907-0069 Total 1430-2246 8950-2747 Total                 Intake    Total Intake -- -- -- -- -- --       Output    Urine  440  1175 1615  --  -- --    Number of Times Voided 2 x 4 x 6 x -- -- --    Urine Void (mL) 440 1175 1615 -- -- --    Stool  --  -- --  --  -- --    Number of Times Stooled 2 x 0 x 2 x -- -- --    Total Output 440 1175 1615 -- -- --       Net I/O     -440 -1175 -1615 -- -- --              Intake/Output Summary (Last 24 hours) at 2024 1024  Last data filed at 2024 0358  Gross per 24 hour   Intake --   Output 1275 ml   Net -1275 ml             acetaminophen  650 mg Q4HRS PRN    Or    HYDROcodone-acetaminophen  1 Tablet Q4HRS PRN    Or    HYDROcodone/acetaminophen  1 Tablet Q4HRS PRN    Or    morphine injection  2 mg Q4HRS PRN    scopolamine  1 Patch Q72HRS    prochlorperazine  10 mg Q6HRS PRN    amLODIPine  10 mg DAILY    carvedilol  6.25 mg BID WITH MEALS    fluticasone  2 Puff BID    insulin GLARGINE  26 Units Q EVENING    lisinopril  40 mg Q EVENING    metFORMIN  500 mg BID WITH MEALS    rosuvastatin  40  mg DAILY    tiotropium  5 mcg DAILY    Pharmacy Consult Request  1 Each PHARMACY TO DOSE    MD ALERT...DO NOT ADMINISTER NSAIDS or ASPIRIN unless ORDERED By Neurosurgery  1 Each PRN    docusate sodium  100 mg BID    senna-docusate  1 Tablet Nightly    senna-docusate  1 Tablet Q24HRS PRN    polyethylene glycol/lytes  1 Packet BID PRN    magnesium hydroxide  30 mL QDAY PRN    bisacodyl  10 mg Q24HRS PRN    sodium phosphate  1 Each Once PRN    0.9 % NaCl with KCl 20 mEq 1,000 mL   Continuous    diphenhydrAMINE  25 mg Q6HRS PRN    Or    diphenhydrAMINE  25 mg Q6HRS PRN    ondansetron  4 mg Q4HRS PRN    ondansetron  4 mg Q4HRS PRN    tizanidine  4 mg TID PRN    labetalol  10 mg Q HOUR PRN    hydrALAZINE  10 mg Q HOUR PRN    omeprazole  20 mg BID       Assessment and Plan:  Hospital day # 6  POD# 5 post lami and ext of fusion to C2, dural tear  Chemical prophylactic DVT therapy: No  Start date/time: ambulatory     NM intact  Pain control-  PRN tylenol, PRN norco (does not eldon oxy), PRN MRs, PRN IV morphine for BT pain  HOB as tolerated   Pt/ot/amb, collar when oob  bowel/bladder protocol +bm  HH ordered  Dc home

## 2024-09-28 NOTE — CARE PLAN
The patient is Stable - Low risk of patient condition declining or worsening    Shift Goals  Clinical Goals: pain control, mobility, safety, neuro checks  Patient Goals: pain control, rest  Family Goals: not present    Problem: Pain - Standard  Goal: Alleviation of pain or a reduction in pain to the patient’s comfort goal  Description: Target End Date:  Prior to discharge or change in level of care  1.  Document pain using the appropriate pain scale per order or unit policy  2.  Educate and implement non-pharmacologic comfort measures (i.e. relaxation, distraction, massage, cold/heat therapy, etc.)  3.  Pain management medications as ordered  4.  Reassess pain after pain med administration per policy  5.  If opiods administered assess patient's response to pain medication is appropriate per POSS sedation scale  Outcome: Progressing     Problem: Fall Risk  Goal: Patient will remain free from falls  Description: Target End Date:  Prior to discharge or change in level of care  1.  Assess for fall risk factors  2.  Implement fall precautions  Outcome: Progressing    Problem: Neuro Status  Goal: Neuro status will remain stable or improve  Description: Target End Date:  Prior to discharge or change in level of care  1.  Assess and monitor neurologic status per provider order/protocol/unit policy  2.  Assess level of consciousness and orientation  3.  Assess for speech, dysarthria, dysphagia, facial symmetry  4.  Assess visual field, eye movements, gaze preference, pupil reaction and size  5.  Assess muscle strength and motor response in all four extremities  6.  Assess for sensation (numbness and tingling)  7.  Assess basic neuro reflexes (cough, gag, corneal)  8.  Identify changes in neuro status and report to provider for testing/treatment orders  Outcome: Progressing

## 2024-12-02 PROBLEM — E86.0 DEHYDRATION: Status: ACTIVE | Noted: 2024-12-02

## 2024-12-03 PROBLEM — E11.9 T2DM (TYPE 2 DIABETES MELLITUS) (HCC): Status: ACTIVE | Noted: 2024-12-03

## 2024-12-03 PROBLEM — G47.30 SLEEP APNEA: Status: ACTIVE | Noted: 2024-12-03

## 2024-12-03 PROBLEM — R65.10 SIRS (SYSTEMIC INFLAMMATORY RESPONSE SYNDROME) (HCC): Status: ACTIVE | Noted: 2024-12-03

## 2025-03-04 ENCOUNTER — HOSPITAL ENCOUNTER (INPATIENT)
Facility: MEDICAL CENTER | Age: 80
LOS: 1 days | DRG: 312 | End: 2025-03-06
Attending: EMERGENCY MEDICINE | Admitting: INTERNAL MEDICINE
Payer: MEDICARE

## 2025-03-04 ENCOUNTER — APPOINTMENT (OUTPATIENT)
Dept: RADIOLOGY | Facility: MEDICAL CENTER | Age: 80
DRG: 312 | End: 2025-03-04
Attending: EMERGENCY MEDICINE
Payer: MEDICARE

## 2025-03-04 DIAGNOSIS — M47.12 CERVICAL SPONDYLOSIS WITH MYELOPATHY: ICD-10-CM

## 2025-03-04 PROBLEM — R55 SYNCOPE, UNSPECIFIED SYNCOPE TYPE: Status: ACTIVE | Noted: 2025-03-04

## 2025-03-04 LAB
ALBUMIN SERPL BCP-MCNC: 4.2 G/DL (ref 3.2–4.9)
ALBUMIN/GLOB SERPL: 1.4 G/DL
ALP SERPL-CCNC: 83 U/L (ref 30–99)
ALT SERPL-CCNC: 19 U/L (ref 2–50)
ANION GAP SERPL CALC-SCNC: 12 MMOL/L (ref 7–16)
AST SERPL-CCNC: 31 U/L (ref 12–45)
BASOPHILS # BLD AUTO: 0.4 % (ref 0–1.8)
BASOPHILS # BLD: 0.03 K/UL (ref 0–0.12)
BILIRUB SERPL-MCNC: 0.7 MG/DL (ref 0.1–1.5)
BUN SERPL-MCNC: 12 MG/DL (ref 8–22)
CALCIUM ALBUM COR SERPL-MCNC: 9.1 MG/DL (ref 8.5–10.5)
CALCIUM SERPL-MCNC: 9.3 MG/DL (ref 8.5–10.5)
CHLORIDE SERPL-SCNC: 103 MMOL/L (ref 96–112)
CO2 SERPL-SCNC: 23 MMOL/L (ref 20–33)
CREAT SERPL-MCNC: 0.94 MG/DL (ref 0.5–1.4)
CRP SERPL HS-MCNC: <0.3 MG/DL (ref 0–0.75)
EKG IMPRESSION: NORMAL
EOSINOPHIL # BLD AUTO: 0.15 K/UL (ref 0–0.51)
EOSINOPHIL NFR BLD: 2.1 % (ref 0–6.9)
ERYTHROCYTE [DISTWIDTH] IN BLOOD BY AUTOMATED COUNT: 42.5 FL (ref 35.9–50)
ERYTHROCYTE [SEDIMENTATION RATE] IN BLOOD BY WESTERGREN METHOD: 10 MM/HOUR (ref 0–20)
FLUAV RNA SPEC QL NAA+PROBE: NEGATIVE
FLUBV RNA SPEC QL NAA+PROBE: NEGATIVE
GFR SERPLBLD CREATININE-BSD FMLA CKD-EPI: 83 ML/MIN/1.73 M 2
GLOBULIN SER CALC-MCNC: 2.9 G/DL (ref 1.9–3.5)
GLUCOSE BLD STRIP.AUTO-MCNC: 136 MG/DL (ref 65–99)
GLUCOSE SERPL-MCNC: 121 MG/DL (ref 65–99)
HCT VFR BLD AUTO: 43.9 % (ref 42–52)
HGB BLD-MCNC: 15.3 G/DL (ref 14–18)
IMM GRANULOCYTES # BLD AUTO: 0.02 K/UL (ref 0–0.11)
IMM GRANULOCYTES NFR BLD AUTO: 0.3 % (ref 0–0.9)
LYMPHOCYTES # BLD AUTO: 1.69 K/UL (ref 1–4.8)
LYMPHOCYTES NFR BLD: 23.9 % (ref 22–41)
MAGNESIUM SERPL-MCNC: 1.6 MG/DL (ref 1.5–2.5)
MCH RBC QN AUTO: 29.9 PG (ref 27–33)
MCHC RBC AUTO-ENTMCNC: 34.9 G/DL (ref 32.3–36.5)
MCV RBC AUTO: 85.7 FL (ref 81.4–97.8)
MONOCYTES # BLD AUTO: 0.65 K/UL (ref 0–0.85)
MONOCYTES NFR BLD AUTO: 9.2 % (ref 0–13.4)
NEUTROPHILS # BLD AUTO: 4.54 K/UL (ref 1.82–7.42)
NEUTROPHILS NFR BLD: 64.1 % (ref 44–72)
NRBC # BLD AUTO: 0 K/UL
NRBC BLD-RTO: 0 /100 WBC (ref 0–0.2)
PLATELET # BLD AUTO: 224 K/UL (ref 164–446)
PMV BLD AUTO: 9.7 FL (ref 9–12.9)
POTASSIUM SERPL-SCNC: 4.5 MMOL/L (ref 3.6–5.5)
PROCALCITONIN SERPL-MCNC: 0.07 NG/ML
PROT SERPL-MCNC: 7.1 G/DL (ref 6–8.2)
RBC # BLD AUTO: 5.12 M/UL (ref 4.7–6.1)
RSV RNA SPEC QL NAA+PROBE: NEGATIVE
SARS-COV-2 RNA RESP QL NAA+PROBE: NOTDETECTED
SODIUM SERPL-SCNC: 138 MMOL/L (ref 135–145)
SPECIMEN SOURCE: NORMAL
TROPONIN T SERPL-MCNC: 11 NG/L (ref 6–19)
TROPONIN T SERPL-MCNC: 13 NG/L (ref 6–19)
TROPONIN T SERPL-MCNC: 15 NG/L (ref 6–19)
TSH SERPL DL<=0.005 MIU/L-ACNC: 2.47 UIU/ML (ref 0.38–5.33)
WBC # BLD AUTO: 7.1 K/UL (ref 4.8–10.8)

## 2025-03-04 PROCEDURE — 71045 X-RAY EXAM CHEST 1 VIEW: CPT

## 2025-03-04 PROCEDURE — 83735 ASSAY OF MAGNESIUM: CPT

## 2025-03-04 PROCEDURE — 96374 THER/PROPH/DIAG INJ IV PUSH: CPT

## 2025-03-04 PROCEDURE — 700111 HCHG RX REV CODE 636 W/ 250 OVERRIDE (IP): Mod: TB | Performed by: EMERGENCY MEDICINE

## 2025-03-04 PROCEDURE — 72131 CT LUMBAR SPINE W/O DYE: CPT

## 2025-03-04 PROCEDURE — 99285 EMERGENCY DEPT VISIT HI MDM: CPT

## 2025-03-04 PROCEDURE — 86140 C-REACTIVE PROTEIN: CPT

## 2025-03-04 PROCEDURE — A9270 NON-COVERED ITEM OR SERVICE: HCPCS

## 2025-03-04 PROCEDURE — 80053 COMPREHEN METABOLIC PANEL: CPT

## 2025-03-04 PROCEDURE — 96372 THER/PROPH/DIAG INJ SC/IM: CPT

## 2025-03-04 PROCEDURE — 700117 HCHG RX CONTRAST REV CODE 255: Performed by: EMERGENCY MEDICINE

## 2025-03-04 PROCEDURE — 72128 CT CHEST SPINE W/O DYE: CPT

## 2025-03-04 PROCEDURE — 36415 COLL VENOUS BLD VENIPUNCTURE: CPT

## 2025-03-04 PROCEDURE — 70498 CT ANGIOGRAPHY NECK: CPT

## 2025-03-04 PROCEDURE — 72125 CT NECK SPINE W/O DYE: CPT

## 2025-03-04 PROCEDURE — 82962 GLUCOSE BLOOD TEST: CPT

## 2025-03-04 PROCEDURE — 93005 ELECTROCARDIOGRAM TRACING: CPT | Mod: TC

## 2025-03-04 PROCEDURE — 99223 1ST HOSP IP/OBS HIGH 75: CPT

## 2025-03-04 PROCEDURE — 85025 COMPLETE CBC W/AUTO DIFF WBC: CPT

## 2025-03-04 PROCEDURE — G0378 HOSPITAL OBSERVATION PER HR: HCPCS

## 2025-03-04 PROCEDURE — 700102 HCHG RX REV CODE 250 W/ 637 OVERRIDE(OP)

## 2025-03-04 PROCEDURE — 0241U HCHG SARS-COV-2 COVID-19 NFCT DS RESP RNA 4 TRGT MIC: CPT

## 2025-03-04 PROCEDURE — 84443 ASSAY THYROID STIM HORMONE: CPT

## 2025-03-04 PROCEDURE — 93005 ELECTROCARDIOGRAM TRACING: CPT | Mod: TC | Performed by: EMERGENCY MEDICINE

## 2025-03-04 PROCEDURE — 84484 ASSAY OF TROPONIN QUANT: CPT

## 2025-03-04 PROCEDURE — 85652 RBC SED RATE AUTOMATED: CPT

## 2025-03-04 PROCEDURE — 70496 CT ANGIOGRAPHY HEAD: CPT

## 2025-03-04 PROCEDURE — 84145 PROCALCITONIN (PCT): CPT

## 2025-03-04 RX ORDER — ROSUVASTATIN CALCIUM 20 MG/1
40 TABLET, COATED ORAL DAILY
Status: DISCONTINUED | OUTPATIENT
Start: 2025-03-05 | End: 2025-03-06 | Stop reason: HOSPADM

## 2025-03-04 RX ORDER — DEXTROSE MONOHYDRATE 25 G/50ML
25 INJECTION, SOLUTION INTRAVENOUS
Status: DISCONTINUED | OUTPATIENT
Start: 2025-03-04 | End: 2025-03-06 | Stop reason: HOSPADM

## 2025-03-04 RX ORDER — HYDROCODONE BITARTRATE AND ACETAMINOPHEN 5; 325 MG/1; MG/1
1 TABLET ORAL
COMMUNITY
Start: 2025-02-20

## 2025-03-04 RX ORDER — OXYCODONE HYDROCHLORIDE 5 MG/1
5 TABLET ORAL
Refills: 0 | Status: DISCONTINUED | OUTPATIENT
Start: 2025-03-04 | End: 2025-03-06 | Stop reason: HOSPADM

## 2025-03-04 RX ORDER — HYDROMORPHONE HYDROCHLORIDE 1 MG/ML
0.5 INJECTION, SOLUTION INTRAMUSCULAR; INTRAVENOUS; SUBCUTANEOUS
Status: DISCONTINUED | OUTPATIENT
Start: 2025-03-04 | End: 2025-03-06 | Stop reason: HOSPADM

## 2025-03-04 RX ORDER — HYDRALAZINE HYDROCHLORIDE 20 MG/ML
10 INJECTION INTRAMUSCULAR; INTRAVENOUS EVERY 4 HOURS PRN
Status: DISCONTINUED | OUTPATIENT
Start: 2025-03-04 | End: 2025-03-06 | Stop reason: HOSPADM

## 2025-03-04 RX ORDER — MIRTAZAPINE 15 MG/1
22.5 TABLET, FILM COATED ORAL NIGHTLY
Status: DISCONTINUED | OUTPATIENT
Start: 2025-03-04 | End: 2025-03-06 | Stop reason: HOSPADM

## 2025-03-04 RX ORDER — ONDANSETRON 2 MG/ML
4 INJECTION INTRAMUSCULAR; INTRAVENOUS EVERY 4 HOURS PRN
Status: DISCONTINUED | OUTPATIENT
Start: 2025-03-04 | End: 2025-03-06 | Stop reason: HOSPADM

## 2025-03-04 RX ORDER — OXYCODONE HYDROCHLORIDE 5 MG/1
2.5 TABLET ORAL
Refills: 0 | Status: DISCONTINUED | OUTPATIENT
Start: 2025-03-04 | End: 2025-03-04

## 2025-03-04 RX ORDER — ONDANSETRON 4 MG/1
4 TABLET, ORALLY DISINTEGRATING ORAL EVERY 4 HOURS PRN
Status: DISCONTINUED | OUTPATIENT
Start: 2025-03-04 | End: 2025-03-06 | Stop reason: HOSPADM

## 2025-03-04 RX ORDER — MIRTAZAPINE 15 MG/1
22.5 TABLET, FILM COATED ORAL NIGHTLY
COMMUNITY
End: 2025-03-20

## 2025-03-04 RX ORDER — ACETAMINOPHEN 325 MG/1
650 TABLET ORAL EVERY 6 HOURS PRN
Status: DISCONTINUED | OUTPATIENT
Start: 2025-03-04 | End: 2025-03-06 | Stop reason: HOSPADM

## 2025-03-04 RX ORDER — LIDOCAINE 4 G/G
2 PATCH TOPICAL EVERY 24 HOURS
Status: DISCONTINUED | OUTPATIENT
Start: 2025-03-05 | End: 2025-03-06 | Stop reason: HOSPADM

## 2025-03-04 RX ORDER — CARVEDILOL 25 MG/1
25 TABLET ORAL 2 TIMES DAILY WITH MEALS
Status: DISCONTINUED | OUTPATIENT
Start: 2025-03-04 | End: 2025-03-06 | Stop reason: HOSPADM

## 2025-03-04 RX ORDER — HYDROMORPHONE HYDROCHLORIDE 1 MG/ML
0.25 INJECTION, SOLUTION INTRAMUSCULAR; INTRAVENOUS; SUBCUTANEOUS
Status: DISCONTINUED | OUTPATIENT
Start: 2025-03-04 | End: 2025-03-04

## 2025-03-04 RX ORDER — METFORMIN HYDROCHLORIDE 500 MG/1
500 TABLET, EXTENDED RELEASE ORAL 2 TIMES DAILY
COMMUNITY
Start: 2025-01-08

## 2025-03-04 RX ORDER — CYCLOBENZAPRINE HCL 10 MG
10 TABLET ORAL 3 TIMES DAILY PRN
Status: DISCONTINUED | OUTPATIENT
Start: 2025-03-04 | End: 2025-03-06 | Stop reason: HOSPADM

## 2025-03-04 RX ORDER — AMLODIPINE BESYLATE 5 MG/1
5 TABLET ORAL DAILY
Status: DISCONTINUED | OUTPATIENT
Start: 2025-03-05 | End: 2025-03-06 | Stop reason: HOSPADM

## 2025-03-04 RX ORDER — FLUTICASONE PROPIONATE AND SALMETEROL 250; 50 UG/1; UG/1
1 POWDER RESPIRATORY (INHALATION) 2 TIMES DAILY
COMMUNITY
Start: 2024-10-31

## 2025-03-04 RX ORDER — OXYCODONE HYDROCHLORIDE 10 MG/1
10 TABLET ORAL
Refills: 0 | Status: DISCONTINUED | OUTPATIENT
Start: 2025-03-04 | End: 2025-03-06 | Stop reason: HOSPADM

## 2025-03-04 RX ORDER — LISINOPRIL 20 MG/1
40 TABLET ORAL EVERY EVENING
Status: DISCONTINUED | OUTPATIENT
Start: 2025-03-04 | End: 2025-03-06 | Stop reason: HOSPADM

## 2025-03-04 RX ORDER — HYDROMORPHONE HYDROCHLORIDE 1 MG/ML
0.5 INJECTION, SOLUTION INTRAMUSCULAR; INTRAVENOUS; SUBCUTANEOUS ONCE
Status: COMPLETED | OUTPATIENT
Start: 2025-03-04 | End: 2025-03-04

## 2025-03-04 RX ORDER — IBUPROFEN 200 MG
600 TABLET ORAL NIGHTLY PRN
Status: ON HOLD | COMMUNITY
End: 2025-03-06

## 2025-03-04 RX ORDER — OMEPRAZOLE 20 MG/1
20 CAPSULE, DELAYED RELEASE ORAL 2 TIMES DAILY
Status: DISCONTINUED | OUTPATIENT
Start: 2025-03-04 | End: 2025-03-06 | Stop reason: HOSPADM

## 2025-03-04 RX ORDER — UBIDECARENONE 75 MG
100 CAPSULE ORAL DAILY
Status: DISCONTINUED | OUTPATIENT
Start: 2025-03-05 | End: 2025-03-06 | Stop reason: HOSPADM

## 2025-03-04 RX ORDER — LORAZEPAM 2 MG/ML
1 INJECTION INTRAMUSCULAR
Status: COMPLETED | OUTPATIENT
Start: 2025-03-04 | End: 2025-03-05

## 2025-03-04 RX ORDER — INSULIN LISPRO 100 [IU]/ML
1-6 INJECTION, SOLUTION INTRAVENOUS; SUBCUTANEOUS
Status: DISCONTINUED | OUTPATIENT
Start: 2025-03-04 | End: 2025-03-06 | Stop reason: HOSPADM

## 2025-03-04 RX ORDER — OXYCODONE HYDROCHLORIDE 5 MG/1
5 TABLET ORAL
Refills: 0 | Status: DISCONTINUED | OUTPATIENT
Start: 2025-03-04 | End: 2025-03-04

## 2025-03-04 RX ORDER — MAGNESIUM OXIDE 420 MG/1
420 TABLET ORAL
COMMUNITY

## 2025-03-04 RX ADMIN — OMEPRAZOLE 20 MG: 20 CAPSULE, DELAYED RELEASE ORAL at 21:09

## 2025-03-04 RX ADMIN — CARVEDILOL 25 MG: 25 TABLET, FILM COATED ORAL at 19:39

## 2025-03-04 RX ADMIN — INSULIN GLARGINE-YFGN 26 UNITS: 100 INJECTION, SOLUTION SUBCUTANEOUS at 20:13

## 2025-03-04 RX ADMIN — CYCLOBENZAPRINE HYDROCHLORIDE 10 MG: 5 TABLET, FILM COATED ORAL at 23:44

## 2025-03-04 RX ADMIN — HYDROMORPHONE HYDROCHLORIDE 0.5 MG: 1 INJECTION, SOLUTION INTRAMUSCULAR; INTRAVENOUS; SUBCUTANEOUS at 18:11

## 2025-03-04 RX ADMIN — LISINOPRIL 40 MG: 20 TABLET ORAL at 19:39

## 2025-03-04 RX ADMIN — OXYCODONE HYDROCHLORIDE 10 MG: 10 TABLET ORAL at 21:06

## 2025-03-04 RX ADMIN — MIRTAZAPINE 22.5 MG: 15 TABLET, FILM COATED ORAL at 20:11

## 2025-03-04 RX ADMIN — IOHEXOL 80 ML: 350 INJECTION, SOLUTION INTRAVENOUS at 16:50

## 2025-03-04 ASSESSMENT — LIFESTYLE VARIABLES
ALCOHOL_USE: NO
EVER HAD A DRINK FIRST THING IN THE MORNING TO STEADY YOUR NERVES TO GET RID OF A HANGOVER: NO
HOW MANY TIMES IN THE PAST YEAR HAVE YOU HAD 5 OR MORE DRINKS IN A DAY: 0
ON A TYPICAL DAY WHEN YOU DRINK ALCOHOL HOW MANY DRINKS DO YOU HAVE: 0
DOES PATIENT WANT TO STOP DRINKING: NO
TOTAL SCORE: 0
HAVE YOU EVER FELT YOU SHOULD CUT DOWN ON YOUR DRINKING: NO
TOTAL SCORE: 0
HAVE PEOPLE ANNOYED YOU BY CRITICIZING YOUR DRINKING: NO
TOTAL SCORE: 0
AVERAGE NUMBER OF DAYS PER WEEK YOU HAVE A DRINK CONTAINING ALCOHOL: 0
CONSUMPTION TOTAL: NEGATIVE
EVER FELT BAD OR GUILTY ABOUT YOUR DRINKING: NO

## 2025-03-04 ASSESSMENT — ENCOUNTER SYMPTOMS
LOSS OF CONSCIOUSNESS: 1
GASTROINTESTINAL NEGATIVE: 1
SHORTNESS OF BREATH: 0
FALLS: 1
DIZZINESS: 1
SEIZURES: 0
EYES NEGATIVE: 1
WEAKNESS: 1
TINGLING: 1
CARDIOVASCULAR NEGATIVE: 1
PSYCHIATRIC NEGATIVE: 1
RESPIRATORY NEGATIVE: 1
NECK PAIN: 1

## 2025-03-04 ASSESSMENT — FIBROSIS 4 INDEX
FIB4 SCORE: 2.44
FIB4 SCORE: 2.58

## 2025-03-04 ASSESSMENT — PAIN DESCRIPTION - PAIN TYPE
TYPE: ACUTE PAIN
TYPE: ACUTE PAIN

## 2025-03-04 ASSESSMENT — PATIENT HEALTH QUESTIONNAIRE - PHQ9
SUM OF ALL RESPONSES TO PHQ9 QUESTIONS 1 AND 2: 0
2. FEELING DOWN, DEPRESSED, IRRITABLE, OR HOPELESS: NOT AT ALL
1. LITTLE INTEREST OR PLEASURE IN DOING THINGS: NOT AT ALL

## 2025-03-04 ASSESSMENT — SOCIAL DETERMINANTS OF HEALTH (SDOH)
WITHIN THE PAST 12 MONTHS, THE FOOD YOU BOUGHT JUST DIDN'T LAST AND YOU DIDN'T HAVE MONEY TO GET MORE: NEVER TRUE
WITHIN THE LAST YEAR, HAVE YOU BEEN AFRAID OF YOUR PARTNER OR EX-PARTNER?: NO
WITHIN THE LAST YEAR, HAVE YOU BEEN KICKED, HIT, SLAPPED, OR OTHERWISE PHYSICALLY HURT BY YOUR PARTNER OR EX-PARTNER?: NO
IN THE PAST 12 MONTHS, HAS THE ELECTRIC, GAS, OIL, OR WATER COMPANY THREATENED TO SHUT OFF SERVICE IN YOUR HOME?: NO
WITHIN THE PAST 12 MONTHS, YOU WORRIED THAT YOUR FOOD WOULD RUN OUT BEFORE YOU GOT THE MONEY TO BUY MORE: NEVER TRUE
WITHIN THE LAST YEAR, HAVE YOU BEEN HUMILIATED OR EMOTIONALLY ABUSED IN OTHER WAYS BY YOUR PARTNER OR EX-PARTNER?: NO
WITHIN THE LAST YEAR, HAVE TO BEEN RAPED OR FORCED TO HAVE ANY KIND OF SEXUAL ACTIVITY BY YOUR PARTNER OR EX-PARTNER?: NO

## 2025-03-04 NOTE — ED PROVIDER NOTES
ED Provider Note    CHIEF COMPLAINT  Chief Complaint   Patient presents with    Fall     Pt states he has been falling frequently just from weakness, last fall was 4 days ago with +LOC pt PCP advised him to come to ED for evaluation of his heart, possible reason for frequent falls.      HPI/ROS      Pérez Willams is a 79 y.o. male who presents complaint of fall and passing out frequently.  The patient states that last time he fell was approximately 4 days ago and had a loss of conscious for a few seconds.  He felt lightheaded and dizzy at the time.  The patient had no neurological changes such as loss of sensation or strength to arms or legs.  He believes being extremely weak specially his upper extremities bilaterally and pain in his upper extremities rating down.  He had cervical spine surgery in the past and went to [facility] CT of the cervical spine and head was complete was negative approxi-1 month prior.  He did go to Dr. Hunter clinic today for his neck and he sent him here [facility] fact that the patient complains of severe weakness and continues to fall and passing out.  Patient denies profound chest pain, nausea, vomiting, fever, alcohol use, drug use, smoking history.  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    SURGICAL HISTORY   has a past surgical history that includes other cardiac surgery (3-4/2012); other abdominal surgery (1987); cervical fusion posterior (08/21/2014); shoulder arthroscopy w/ rotator cuff repair  (); lumbar laminectomy diskectomy; carpal tunnel release (Left); appendectomy; lumbar laminectomy diskectomy (2019); ercp,diagnostic (N/A, 2021); ercp,w/removal stone,anne/pancr ducts (N/A, 2021); robin by laparoscopy (N/A, 2021); other (Bilateral, 2024); and posterior cervical fusion o-arm (N/A, 2024).    FAMILY HISTORY  History reviewed. No pertinent family history.    SOCIAL HISTORY  Social History     Tobacco Use    Smoking status: Former     Current packs/day: 0.00     Average packs/day: 3.0 packs/day for 21.0 years (63.0 ttl pk-yrs)     Types: Cigarettes     Start date: 1962     Quit date:      Years since quittin.2    Smokeless tobacco: Never   Vaping Use    Vaping status: Never Used   Substance and Sexual Activity    Alcohol use: No     Comment: Quit in     Drug use: No    Sexual activity: Not on file       CURRENT MEDICATIONS  Home Medications       Reviewed by Kentrell Cline (Pharmacy Tech) on 25 at 1331  Med List Status: Complete     Medication Last Dose Status   Acetaminophen (TYLENOL PO) 3/2/2025 Active   amLODIPine (NORVASC) 5 MG Tab 3/4/2025 Active   carvedilol (COREG) 25 MG Tab 3/4/2025 Active   cyanocobalamin 100 MCG tablet 3/4/2025 Active   fluticasone-salmeterol (ADVAIR) 250-50 MCG/ACT AEROSOL POWDER, BREATH ACTIVATED 3/4/2025 Active   HYDROcodone-acetaminophen (NORCO) 5-325 MG Tab per tablet 3/3/2025 Active   ibuprofen (MOTRIN) 200 MG Tab 3/2/2025 Active   insulin GLARGINE (LANTUS,SEMGLEE) 100 UNIT/ML Solution 3/3/2025 Active   lisinopril (PRINIVIL) 40 MG tablet 3/3/2025 Active   Magnesium Oxide 420 MG Tab 3/4/2025 Active   metFORMIN ER (GLUCOPHAGE XR) 500 MG TABLET SR 24 HR 3/4/2025 Active   mirtazapine (REMERON) 15 MG Tab 3/3/2025 Active   pantoprazole (PROTONIX) 40 MG Tablet Delayed Response 3/4/2025 Active   rosuvastatin (CRESTOR) 40 MG tablet 3/4/2025 Active   tiotropium (SPIRIVA RESPIMAT) 2.5 mcg/Act Aero Soln 3/4/2025  "Active                  Audit from Redirected Encounters    **Home medications have not yet been reviewed for this encounter**         ALLERGIES  No Known Allergies    PHYSICAL EXAM  VITAL SIGNS: BP (!) 174/100   Pulse 75   Temp 36.3 °C (97.4 °F) (Temporal)   Resp 15   Ht 1.778 m (5' 10\")   Wt 95.1 kg (209 lb 10.5 oz)   SpO2 91%   BMI 30.08 kg/m²      Nursing notes and vitals reviewed.  Constitutional: Well developed, Well nourished, No acute distress, Non-toxic appearance.   Eyes: PERRLA, EOMI, Conjunctiva normal, No discharge.   HENT: Normocephalic, Atraumatic, moist mucous membranes, no facial edema Cardiovascular: Normal heart rate, Normal rhythm, No murmurs, No rubs, No gallops.   Thorax & Lungs: No respiratory distress, No rales, No rhonchi, No wheezing, No chest tenderness.   Abdomen: Bowel sounds normal, Soft, No tenderness, No guarding, No rebound, No masses, No pulsatile masses.   Skin: Warm, Dry, No erythema, No rash.   Extremities: No deformity, no edema, good range of motion range of motion upper lower extremes bilaterally  Musculoskeletal: Slight mid cervical, thoracic lumbar spine tenderness with no step-off deformity  Neurologic:  Alert & oriented to month and age, Normal cognition, Cranial nerves II-XII are intact, No slurred speech, Negative finger to nose bilaterally, No pronator drift bilaterally,   strength 5/5 bilaterally, Leg raise strength 5/5 bilaterally, Plantarflexion strength 5/5 bilaterally, Dorsiflexion strength 5/5 bilaterally, Deep tendon reflexes 2/4 upper and lower extremities bilaterally, Sensation intact throughout, No Nystagmus.  No saddle anesthesia  Psychiatric: Affect normal for clinical presentation.      EKG/LABS  Normal sinus rhythm on monitor  I have independently interpreted this EKG  Results for orders placed or performed during the hospital encounter of 03/04/25   EKG    Collection Time: 03/04/25 12:41 PM   Result Value Ref Range    Report       [facility]    Test Date:  2025  Pt Name:    SHIRA BRAN                  Department: ER  MRN:        [id number]                      Room:  Gender:     Male                         Technician: [id number]  :        1945                   Requested By:ER TRIAGE PROTOCOL  Order #:    [id number]                    Reading MD: JUSTIN BRUNO DO    Measurements  Intervals                                Axis  Rate:       71                           P:          28  ID:         [id number]                          QRS:        -48  QRSD:       105                          T:          74  QT:         396  QTc:        431    Interpretive Statements  Sinus rhythm  Prolonged ID interval  Left anterior fascicular block  Anterior infarct, old  Compared to ECG 2021 04:54:03  Left anterior fascicular block now present    Electronically Signed On 2025 12:41:38 PST by JUSTIN BRUNO DO     CBC WITH DIFFERENTIAL    Collection Time: 25 12:55 PM   Result Value Ref Range    WBC 7.1 4.8 - 10.8 K/uL    RBC 5.12 4.70 - 6.10 M/uL    Hemoglobin 15.3 14.0 - 18.0 g/dL    Hematocrit 43.9 42.0 - 52.0 %    MCV 85.7 81.4 - 97.8 fL    MCH 29.9 27.0 - 33.0 pg    MCHC 34.9 32.3 - 36.5 g/dL    RDW 42.5 35.9 - 50.0 fL    Platelet Count 224 164 - 446 K/uL    MPV 9.7 9.0 - 12.9 fL    Neutrophils-Polys 64.10 44.00 - 72.00 %    Lymphocytes 23.90 22.00 - 41.00 %    Monocytes 9.20 0.00 - 13.40 %    Eosinophils 2.10 0.00 - 6.90 %    Basophils 0.40 0.00 - 1.80 %    Immature Granulocytes 0.30 0.00 - 0.90 %    Nucleated RBC 0.00 0.00 - 0.20 /100 WBC    Neutrophils (Absolute) 4.54 1.82 - 7.42 K/uL    Lymphs (Absolute) 1.69 1.00 - 4.80 K/uL    Monos (Absolute) 0.65 0.00 - 0.85 K/uL    Eos (Absolute) 0.15 0.00 - 0.51 K/uL    Baso (Absolute) 0.03 0.00 - 0.12 K/uL    Immature Granulocytes (abs) 0.02 0.00 - 0.11 K/uL    NRBC (Absolute) 0.00 K/uL   COMP METABOLIC PANEL    Collection Time: 25 12:55 PM    Result Value Ref Range    Sodium 138 135 - 145 mmol/L    Potassium 4.5 3.6 - 5.5 mmol/L    Chloride 103 96 - 112 mmol/L    Co2 23 20 - 33 mmol/L    Anion Gap 12.0 7.0 - 16.0    Glucose 121 (H) 65 - 99 mg/dL    Bun 12 8 - 22 mg/dL    Creatinine 0.94 0.50 - 1.40 mg/dL    Calcium 9.3 8.5 - 10.5 mg/dL    Correct Calcium 9.1 8.5 - 10.5 mg/dL    AST(SGOT) 31 12 - 45 U/L    ALT(SGPT) 19 2 - 50 U/L    Alkaline Phosphatase 83 30 - 99 U/L    Total Bilirubin 0.7 0.1 - 1.5 mg/dL    Albumin 4.2 3.2 - 4.9 g/dL    Total Protein 7.1 6.0 - 8.2 g/dL    Globulin 2.9 1.9 - 3.5 g/dL    A-G Ratio 1.4 g/dL   TROPONIN    Collection Time: 03/04/25 12:55 PM   Result Value Ref Range    Troponin T 11 6 - 19 ng/L   MAGNESIUM    Collection Time: 03/04/25 12:55 PM   Result Value Ref Range    Magnesium 1.6 1.5 - 2.5 mg/dL   Sed Rate    Collection Time: 03/04/25 12:55 PM   Result Value Ref Range    Sed Rate Westergren 10 0 - 20 mm/hour   CRP QUANTITIVE (NON-CARDIAC)    Collection Time: 03/04/25 12:55 PM   Result Value Ref Range    Stat C-Reactive Protein <0.30 0.00 - 0.75 mg/dL   TSH WITH REFLEX TO FT4    Collection Time: 03/04/25 12:55 PM   Result Value Ref Range    TSH 2.470 0.380 - 5.330 uIU/mL   PROCALCITONIN    Collection Time: 03/04/25 12:55 PM   Result Value Ref Range    Procalcitonin 0.07 <0.25 ng/mL   ESTIMATED GFR    Collection Time: 03/04/25 12:55 PM   Result Value Ref Range    GFR (CKD-EPI) 83 >60 mL/min/1.73 m 2   CoV-2, FLU A/B, and RSV by PCR (2-4 Hours CEPHEID) : Collect NP swab in VTM    Collection Time: 03/04/25  1:08 PM    Specimen: Respirate   Result Value Ref Range    Influenza virus A RNA Negative Negative    Influenza virus B, PCR Negative Negative    RSV, PCR Negative Negative    SARS-CoV-2 by PCR NotDetected     SARS-CoV-2 Source NP Swab        RADIOLOGY/PROCEDURES   I have independently interpreted the diagnostic imaging associated with this visit and am waiting the final reading from the radiologist.   My preliminary  interpretation is as follows: Chest x-ray is negative for infiltrate  Radiologist interpretation:  CT-LSPINE W/O PLUS RECONS   Final Result         1. Osteopenia.      2. No evidence for acute fracture or subluxation lumbar spine.      3. Moderate osteoarthritic changes at the L3-4 level with mild to moderate osteoarthritic changes at the L1-2, L2-3, and L5-S1 levels.      4. Mild lumbar spondylotic changes at the L2-3 and L3-4 levels with evidence of mild central canal stenosis at these levels.      5. previous laminectomy at the L3 level.      CT-TSPINE W/O PLUS RECONS   Final Result      1.  Osteopenia.      2.  No evidence acute fracture or subluxation of the thoracic spine.      3.  C7 laminectomy noted.      CT-CSPINE WITHOUT PLUS RECONS   Final Result      1.  Prior multilevel laminectomy and posterior fusion.   2.  Inferior articular process fracture on the RIGHT at C6-7.   3.  Potential fracture within bone graft material at the posterior margin of LEFT C5-6 facet with partial facet uncovering.   4.  Probable chronic moderate compression of C7.      These findings were electronically conveyed to and received by JUSTIN BRUNO on 3/4/2025 5:52 PM.         CT-CTA NECK WITH & W/O-POST PROCESSING   Final Result      No focal high-grade stenosis, dissection or occlusion of the cervical carotid or vertebral arteries.      CT-CTA HEAD WITH & W/O-POST PROCESS   Final Result      CT angiogram of the [place] within normal limits.      DX-CHEST-PORTABLE (1 VIEW)   Final Result      No acute cardiopulmonary disease evident.      EC-ECHOCARDIOGRAM COMPLETE W/O CONT    (Results Pending)   MR-BRAIN-W/O    (Results Pending)       COURSE & MEDICAL DECISION MAKING    ASSESSMENT, COURSE AND PLAN  Care Narrative: This is a pleasant 79-year-old male presents with multiple syncopal episodes, neck pain, back pain.  Initially has no focal neurological deficits note evidence of CVA.  Concern for possible drop attacks  posterior circulation abnormality of her CTs were ordered for CT head and neck as well as cervical, thoracic lumbar spine.  The patient has no EKG changes suspicion for abnormality, no Radha abnormality.  Believe the patient will require hospitalization for evaluation of his frequent syncopal episodes trauma.  I discussed the patient Dr. Grant who did reviewed the images, does believe the patient has a defect in the cervical spine does not require a cervical collar.  He states that he will be consulting with Dr. Hunter to see the patient in the hospital.  Patient will probably need MRI but not emergently.  I do believe the patient require further evaluation for his syncope and CTA head neck were negative for posterior circulation abnormality.  He has no evidence of prolonged QTc, Brugada syndrome, high-grade AV block.  I discussed the patient with Tiara Valladares with CDU for hospitalization.        DISPOSITION AND DISCUSSIONS  I have discussed management of the patient with the following physicians and LAURI's:  Tiara Zaidi        FINAL DIAGNOSIS  Syncope  Cervical spine fracture of the inferior articular surface of C6-C7       Electronically signed by: Semaj Archer D.O., 3/4/2025 3:33 PM

## 2025-03-04 NOTE — ED TRIAGE NOTES
Chief Complaint   Patient presents with    Fall     Pt states he has been falling frequently just from weakness, last fall was 4 days ago with +LOC pt PCP advised him to come to ED for evaluation of his heart, possible reason for frequent falls.      Pt ambulatory to triage for above complaint. Pt reports intermittent dizziness denies CP or SOB.      Pt is alert/oriented and follows commands. Pt speaking in full sentences and responds appropriately to questions. No acute distress noted in triage and respirations are even and unlabored.     Pt placed in lobby and educated on triage process. Pt encouraged to alert staff for any changes in condition.

## 2025-03-04 NOTE — ED NOTES
Med Rec complete [facility] and [facility]   Allergies reviewed  Antibiotics in the past 30 days:no  Anticoagulant in past 14 days:no  Pharmacy patient utilizes:[facility]

## 2025-03-05 ENCOUNTER — APPOINTMENT (OUTPATIENT)
Dept: RADIOLOGY | Facility: MEDICAL CENTER | Age: 80
DRG: 312 | End: 2025-03-05
Attending: INTERNAL MEDICINE
Payer: MEDICARE

## 2025-03-05 ENCOUNTER — APPOINTMENT (OUTPATIENT)
Dept: CARDIOLOGY | Facility: MEDICAL CENTER | Age: 80
DRG: 312 | End: 2025-03-05
Payer: MEDICARE

## 2025-03-05 PROBLEM — M46.22 OSTEOMYELITIS OF CERVICAL SPINE (HCC): Status: ACTIVE | Noted: 2025-03-05

## 2025-03-05 LAB
ANION GAP SERPL CALC-SCNC: 11 MMOL/L (ref 7–16)
BUN SERPL-MCNC: 12 MG/DL (ref 8–22)
CALCIUM SERPL-MCNC: 9 MG/DL (ref 8.5–10.5)
CHLORIDE SERPL-SCNC: 102 MMOL/L (ref 96–112)
CO2 SERPL-SCNC: 25 MMOL/L (ref 20–33)
CREAT SERPL-MCNC: 1.01 MG/DL (ref 0.5–1.4)
CRP SERPL HS-MCNC: <0.3 MG/DL (ref 0–0.75)
ERYTHROCYTE [DISTWIDTH] IN BLOOD BY AUTOMATED COUNT: 43.7 FL (ref 35.9–50)
ERYTHROCYTE [SEDIMENTATION RATE] IN BLOOD BY WESTERGREN METHOD: 9 MM/HOUR (ref 0–20)
EST. AVERAGE GLUCOSE BLD GHB EST-MCNC: 154 MG/DL
GFR SERPLBLD CREATININE-BSD FMLA CKD-EPI: 76 ML/MIN/1.73 M 2
GLUCOSE BLD STRIP.AUTO-MCNC: 101 MG/DL (ref 65–99)
GLUCOSE BLD STRIP.AUTO-MCNC: 85 MG/DL (ref 65–99)
GLUCOSE SERPL-MCNC: 98 MG/DL (ref 65–99)
HBA1C MFR BLD: 7 % (ref 4–5.6)
HCT VFR BLD AUTO: 42.6 % (ref 42–52)
HGB BLD-MCNC: 14.6 G/DL (ref 14–18)
LV EJECT FRACT MOD 2C 99903: 52.11
LV EJECT FRACT MOD 4C 99902: 54.57
LV EJECT FRACT MOD BP 99901: 54.41
MCH RBC QN AUTO: 29.9 PG (ref 27–33)
MCHC RBC AUTO-ENTMCNC: 34.3 G/DL (ref 32.3–36.5)
MCV RBC AUTO: 87.3 FL (ref 81.4–97.8)
PLATELET # BLD AUTO: 194 K/UL (ref 164–446)
PMV BLD AUTO: 9 FL (ref 9–12.9)
POTASSIUM SERPL-SCNC: 3.8 MMOL/L (ref 3.6–5.5)
RBC # BLD AUTO: 4.88 M/UL (ref 4.7–6.1)
SODIUM SERPL-SCNC: 138 MMOL/L (ref 135–145)
WBC # BLD AUTO: 7.2 K/UL (ref 4.8–10.8)

## 2025-03-05 PROCEDURE — 93306 TTE W/DOPPLER COMPLETE: CPT | Mod: 26 | Performed by: INTERNAL MEDICINE

## 2025-03-05 PROCEDURE — A9270 NON-COVERED ITEM OR SERVICE: HCPCS

## 2025-03-05 PROCEDURE — 700102 HCHG RX REV CODE 250 W/ 637 OVERRIDE(OP)

## 2025-03-05 PROCEDURE — 85027 COMPLETE CBC AUTOMATED: CPT

## 2025-03-05 PROCEDURE — 96375 TX/PRO/DX INJ NEW DRUG ADDON: CPT

## 2025-03-05 PROCEDURE — 87040 BLOOD CULTURE FOR BACTERIA: CPT

## 2025-03-05 PROCEDURE — 70551 MRI BRAIN STEM W/O DYE: CPT

## 2025-03-05 PROCEDURE — 72156 MRI NECK SPINE W/O & W/DYE: CPT

## 2025-03-05 PROCEDURE — 700111 HCHG RX REV CODE 636 W/ 250 OVERRIDE (IP)

## 2025-03-05 PROCEDURE — 700117 HCHG RX CONTRAST REV CODE 255: Mod: JZ

## 2025-03-05 PROCEDURE — 93306 TTE W/DOPPLER COMPLETE: CPT

## 2025-03-05 PROCEDURE — 700101 HCHG RX REV CODE 250

## 2025-03-05 PROCEDURE — 80048 BASIC METABOLIC PNL TOTAL CA: CPT

## 2025-03-05 PROCEDURE — 85652 RBC SED RATE AUTOMATED: CPT

## 2025-03-05 PROCEDURE — 99233 SBSQ HOSP IP/OBS HIGH 50: CPT | Mod: FS | Performed by: INTERNAL MEDICINE

## 2025-03-05 PROCEDURE — 86140 C-REACTIVE PROTEIN: CPT

## 2025-03-05 PROCEDURE — 82962 GLUCOSE BLOOD TEST: CPT

## 2025-03-05 PROCEDURE — 770020 HCHG ROOM/CARE - TELE (206)

## 2025-03-05 PROCEDURE — 83036 HEMOGLOBIN GLYCOSYLATED A1C: CPT

## 2025-03-05 PROCEDURE — A9579 GAD-BASE MR CONTRAST NOS,1ML: HCPCS | Mod: JZ

## 2025-03-05 PROCEDURE — 36415 COLL VENOUS BLD VENIPUNCTURE: CPT

## 2025-03-05 PROCEDURE — 93005 ELECTROCARDIOGRAM TRACING: CPT | Mod: TC | Performed by: INTERNAL MEDICINE

## 2025-03-05 RX ORDER — ENOXAPARIN SODIUM 100 MG/ML
30 INJECTION SUBCUTANEOUS EVERY 12 HOURS
Status: DISCONTINUED | OUTPATIENT
Start: 2025-03-05 | End: 2025-03-06 | Stop reason: HOSPADM

## 2025-03-05 RX ORDER — LORAZEPAM 2 MG/ML
1 INJECTION INTRAMUSCULAR ONCE
Status: COMPLETED | OUTPATIENT
Start: 2025-03-05 | End: 2025-03-05

## 2025-03-05 RX ORDER — MAGNESIUM SULFATE HEPTAHYDRATE 40 MG/ML
2 INJECTION, SOLUTION INTRAVENOUS ONCE
Status: COMPLETED | OUTPATIENT
Start: 2025-03-05 | End: 2025-03-05

## 2025-03-05 RX ADMIN — CYCLOBENZAPRINE HYDROCHLORIDE 10 MG: 5 TABLET, FILM COATED ORAL at 19:57

## 2025-03-05 RX ADMIN — AMLODIPINE BESYLATE 5 MG: 5 TABLET ORAL at 05:54

## 2025-03-05 RX ADMIN — OXYCODONE HYDROCHLORIDE 5 MG: 5 TABLET ORAL at 19:56

## 2025-03-05 RX ADMIN — LISINOPRIL 40 MG: 20 TABLET ORAL at 17:16

## 2025-03-05 RX ADMIN — ROSUVASTATIN CALCIUM 40 MG: 20 TABLET, FILM COATED ORAL at 05:54

## 2025-03-05 RX ADMIN — ENOXAPARIN SODIUM 30 MG: 100 INJECTION SUBCUTANEOUS at 17:16

## 2025-03-05 RX ADMIN — OMEPRAZOLE 20 MG: 20 CAPSULE, DELAYED RELEASE ORAL at 19:56

## 2025-03-05 RX ADMIN — TIOTROPIUM BROMIDE INHALATION SPRAY 5 MCG: 3.12 SPRAY, METERED RESPIRATORY (INHALATION) at 07:20

## 2025-03-05 RX ADMIN — GADOTERIDOL 20 ML: 279.3 INJECTION, SOLUTION INTRAVENOUS at 10:09

## 2025-03-05 RX ADMIN — INSULIN GLARGINE-YFGN 26 UNITS: 100 INJECTION, SOLUTION SUBCUTANEOUS at 17:23

## 2025-03-05 RX ADMIN — OXYCODONE HYDROCHLORIDE 5 MG: 5 TABLET ORAL at 15:10

## 2025-03-05 RX ADMIN — LORAZEPAM 1 MG: 2 INJECTION INTRAMUSCULAR; INTRAVENOUS at 09:05

## 2025-03-05 RX ADMIN — OMEPRAZOLE 20 MG: 20 CAPSULE, DELAYED RELEASE ORAL at 07:19

## 2025-03-05 RX ADMIN — LIDOCAINE 2 PATCH: 4 PATCH TOPICAL at 00:25

## 2025-03-05 RX ADMIN — OXYCODONE HYDROCHLORIDE 10 MG: 10 TABLET ORAL at 05:57

## 2025-03-05 RX ADMIN — OXYCODONE HYDROCHLORIDE 10 MG: 10 TABLET ORAL at 00:25

## 2025-03-05 RX ADMIN — MOMETASONE FUROATE AND FORMOTEROL FUMARATE DIHYDRATE 2 PUFF: 200; 5 AEROSOL RESPIRATORY (INHALATION) at 07:22

## 2025-03-05 RX ADMIN — VITAM B12 100 MCG: 100 TAB at 05:54

## 2025-03-05 RX ADMIN — CARVEDILOL 25 MG: 25 TABLET, FILM COATED ORAL at 17:16

## 2025-03-05 RX ADMIN — MAGNESIUM SULFATE HEPTAHYDRATE 2 G: 2 INJECTION, SOLUTION INTRAVENOUS at 16:45

## 2025-03-05 ASSESSMENT — PAIN DESCRIPTION - PAIN TYPE
TYPE: ACUTE PAIN

## 2025-03-05 ASSESSMENT — ENCOUNTER SYMPTOMS
SPUTUM PRODUCTION: 0
PALPITATIONS: 0
HEADACHES: 0
TINGLING: 1
DIZZINESS: 0
FEVER: 0
NECK PAIN: 1
ABDOMINAL PAIN: 0
SHORTNESS OF BREATH: 0
BACK PAIN: 1
WEAKNESS: 0
PSYCHIATRIC NEGATIVE: 1
VOMITING: 0
NAUSEA: 0
COUGH: 0
FLANK PAIN: 0
SENSORY CHANGE: 1
BLURRED VISION: 0
FOCAL WEAKNESS: 1
DEPRESSION: 0
SORE THROAT: 0
DOUBLE VISION: 0
CHILLS: 0
EYES NEGATIVE: 1
FALLS: 1
WHEEZING: 0

## 2025-03-05 ASSESSMENT — FIBROSIS 4 INDEX
FIB4 SCORE: 2.82
FIB4 SCORE: 2.44

## 2025-03-05 ASSESSMENT — PATIENT HEALTH QUESTIONNAIRE - PHQ9
SUM OF ALL RESPONSES TO PHQ9 QUESTIONS 1 AND 2: 0
1. LITTLE INTEREST OR PLEASURE IN DOING THINGS: NOT AT ALL
2. FEELING DOWN, DEPRESSED, IRRITABLE, OR HOPELESS: NOT AT ALL

## 2025-03-05 NOTE — H&P
Hospital Medicine History & Physical Note    Date of Service  3/4/2025    Primary Care Physician  Quentin Baker N.P.    Consultants  neurosurgery    Specialist Names: Dr. Grant    Code Status  Full Code    Chief Complaint  Chief Complaint   Patient presents with    Fall     Pt states he has been falling frequently just from weakness, last fall was 4 days ago with +LOC pt PCP advised him to come to ED for evaluation of his heart, possible reason for frequent falls.        History of Presenting Illness  Pérez Willams is a 79 y.o. male who presented 3/4/2025 with fall and neck pain.    Is a 79-year-old male who presents to the emergency department with complaints of multiple ground-level falls and neck pain.  He has a past medical history of hypertension, COPD, diabetes, GERD, MI with stents, cholecystectomy and cervical fusion with Dr. Peralta.    Patient seen and examined at bedside.  Patient states that he has been having frequent falls over the last month.  He suffered from a ground-level fall a couple days ago with increase in neck pain which prompted him to be evaluated in the emergency department.  Patient states that he has had some dizziness that he associated with vertigo.  He states that these episodes often have no warning and he wakes on the ground.  He denies any bowel or bladder incontinence or history of seizures.  He states his PCP is concerned regarding his heart.  Patient is concerned regarding his neck pain.    In the emergency department, CBC, CMP, troponin, procalcitonin, TSH, CRP are all unremarkable.  COVID/flu/RSV negative.  Chest x-ray without cardiopulmonary disease.  CT/CTA head and neck are unremarkable.  CT L and T-spine without acute findings.  CT C-spine demonstrates inferior articular process fracture on the right at C6-7, potential fracture within bone graft material at the posterior margin of left C5-6 facet with partial facet uncovering.  CT findings were discussed with   Juanita from neurosurgery by ERP.  Recommend MRI cervical spine and soft c-collar as needed for pain.    Patient will be admitted to the observation unit and monitored on telemetry.  For the syncopal episodes he will undergo echocardiogram and MRI brain.  We will check orthostatic BPs.  MRI cervical spine ordered per neurology recommendation.  Follow-up a.m. labs.    I discussed the plan of care with patient and ERP .    Review of Systems  Review of Systems   Constitutional:  Positive for malaise/fatigue.   HENT: Negative.     Eyes: Negative.    Respiratory: Negative.  Negative for shortness of breath.    Cardiovascular: Negative.  Negative for chest pain.   Gastrointestinal: Negative.    Genitourinary: Negative.    Musculoskeletal:  Positive for falls and neck pain.   Skin: Negative.    Neurological:  Positive for dizziness, tingling, loss of consciousness and weakness. Negative for seizures.   Endo/Heme/Allergies: Negative.    Psychiatric/Behavioral: Negative.     All other systems reviewed and are negative.      Past Medical History   has a past medical history of Arthritis (09/06/2024), Breath shortness (09/06/2024), Cataract (09/06/2024), Cholesterol blood decreased, COPD, mild ([facility]), Dental disorder (09/06/2024), Diabetes ([facility]) (09/06/2024), Disc degeneration, Emphysema of lung ([facility]) (09/06/2024), Heart burn (09/06/2024), High cholesterol (09/06/2024), Hypertension (09/06/2024), Indigestion (09/06/2024), Myocardial infarct ([facility]) (09/06/2024), Pneumonia, Psychiatric problem, Sleep apnea, and Snoring (09/06/2024).    Surgical History   has a past surgical history that includes other cardiac surgery (3-4/2012); other abdominal surgery (1987); cervical fusion posterior (08/21/2014); shoulder arthroscopy w/ rotator cuff repair (1991); lumbar laminectomy diskectomy; carpal tunnel release (Left); appendectomy; lumbar laminectomy diskectomy (08/21/2019); pr ercp,diagnostic (N/A, 07/05/2021); pr ercp,w/removal  stone,anne/pancr ducts (N/A, 07/05/2021); robin by laparoscopy (N/A, 07/06/2021); other (Bilateral, 09/06/2024); and posterior cervical fusion o-arm (N/A, 9/23/2024).     Family History  family history is not on file.   Family history reviewed with patient. There is no family history that is pertinent to the chief complaint.     Social History   reports that he quit smoking about 42 years ago. His smoking use included cigarettes. He started smoking about 63 years ago. He has a 63 pack-year smoking history. He has never used smokeless tobacco. He reports that he does not drink alcohol and does not use drugs.    Allergies  No Known Allergies    Medications  Prior to Admission Medications   Prescriptions Last Dose Informant Patient Reported? Taking?   Acetaminophen (TYLENOL PO) 3/2/2025 Bedtime Patient Yes Yes   Sig: Take 1 Tablet by mouth at bedtime as needed.   HYDROcodone-acetaminophen (NORCO) 5-325 MG Tab per tablet 3/3/2025 at  5:00 PM Patient Yes Yes   Sig: Take 1 Tablet by mouth one time as needed (pain).   Magnesium Oxide 420 MG Tab 3/4/2025 Morning Patient Yes Yes   Sig: Take 420 mg by mouth every day.   amLODIPine (NORVASC) 5 MG Tab 3/4/2025 Morning Patient Yes Yes   Sig: Take 5 mg by mouth every day.      carvedilol (COREG) 25 MG Tab 3/4/2025 Morning Patient Yes Yes   Sig: Take 25 mg by mouth 2 times a day with meals.      cyanocobalamin 100 MCG tablet 3/4/2025 Morning Patient Yes Yes   Sig: Take 100 mcg by mouth every day.   fluticasone-salmeterol (ADVAIR) 250-50 MCG/ACT AEROSOL POWDER, BREATH ACTIVATED 3/4/2025 Morning Patient Yes Yes   Sig: Inhale 1 Puff 2 times a day.   ibuprofen (MOTRIN) 200 MG Tab 3/2/2025 Patient Yes Yes   Sig: Take 600 mg by mouth at bedtime as needed for Mild Pain. 600 mg = 3 tabs   insulin GLARGINE (LANTUS,SEMGLEE) 100 UNIT/ML Solution 3/3/2025 Evening Patient Yes Yes   Sig: Inject 26 Units under the skin every evening.      lisinopril (PRINIVIL) 40 MG tablet 3/3/2025 Evening  Patient Yes Yes   Sig: Take 40 mg by mouth every evening.      metFORMIN ER (GLUCOPHAGE XR) 500 MG TABLET SR 24 HR 3/4/2025 Morning Patient Yes Yes   Sig: Take 500 mg by mouth 2 times a day.   mirtazapine (REMERON) 15 MG Tab 3/3/2025 Bedtime Patient Yes Yes   Sig: Take 22.5 mg by mouth every evening. 22.5 mg = 1.5 tabs   pantoprazole (PROTONIX) 40 MG Tablet Delayed Response 3/4/2025 Morning Patient Yes Yes   Sig: Take 40 mg by mouth 2 times a day.      rosuvastatin (CRESTOR) 40 MG tablet 3/4/2025 Morning Patient Yes Yes   Sig: Take 40 mg by mouth every day.      tiotropium (SPIRIVA RESPIMAT) 2.5 mcg/Act Aero Soln 3/4/2025 Morning Patient Yes Yes   Sig: Inhale 5 mcg every day.      Facility-Administered Medications: None       Physical Exam  Temp:  [36.3 °C (97.4 °F)] 36.3 °C (97.4 °F)  Pulse:  [66-88] 77  Resp:  [15-17] 15  BP: (132-207)/() 161/92  SpO2:  [89 %-96 %] 89 %  Blood Pressure : (!) 174/100   Temperature: 36.3 °C (97.4 °F)   Pulse: 75   Respiration: 15   Pulse Oximetry: 91 %       Physical Exam  Vitals and nursing note reviewed.   HENT:      Head: Normocephalic.      Nose: Nose normal.      Mouth/Throat:      Mouth: Mucous membranes are moist.   Eyes:      Extraocular Movements: Extraocular movements intact.      Pupils: Pupils are equal, round, and reactive to light.   Cardiovascular:      Rate and Rhythm: Normal rate and regular rhythm.      Pulses: Normal pulses.      Heart sounds: Normal heart sounds.   Pulmonary:      Effort: Pulmonary effort is normal.      Breath sounds: Normal breath sounds.   Abdominal:      General: Abdomen is flat. Bowel sounds are normal.      Palpations: Abdomen is soft.   Musculoskeletal:         General: Normal range of motion.      Cervical back: Tenderness present.   Skin:     General: Skin is warm.      Capillary Refill: Capillary refill takes 2 to 3 seconds.   Neurological:      General: No focal deficit present.      Mental Status: He is alert and oriented to  "person, place, and time. Mental status is at baseline.      Motor: No weakness.      Gait: Gait normal.   Psychiatric:         Mood and Affect: Mood normal.         Behavior: Behavior normal.         Thought Content: Thought content normal.         Judgment: Judgment normal.         Laboratory:  Recent Labs     03/04/25  1255   WBC 7.1   RBC 5.12   HEMOGLOBIN 15.3   HEMATOCRIT 43.9   MCV 85.7   MCH 29.9   MCHC 34.9   RDW 42.5   PLATELETCT 224   MPV 9.7     Recent Labs     03/04/25  1255   SODIUM 138   POTASSIUM 4.5   CHLORIDE 103   CO2 23   GLUCOSE 121*   BUN 12   CREATININE 0.94   CALCIUM 9.3     Recent Labs     03/04/25  1255   ALTSGPT 19   ASTSGOT 31   ALKPHOSPHAT 83   TBILIRUBIN 0.7   GLUCOSE 121*         No results for input(s): \"NTPROBNP\" in the last 72 hours.      Recent Labs     03/04/25  1255   TROPONINT 11       Imaging:  CT-LSPINE W/O PLUS RECONS   Final Result         1. Osteopenia.      2. No evidence for acute fracture or subluxation lumbar spine.      3. Moderate osteoarthritic changes at the L3-4 level with mild to moderate osteoarthritic changes at the L1-2, L2-3, and L5-S1 levels.      4. Mild lumbar spondylotic changes at the L2-3 and L3-4 levels with evidence of mild central canal stenosis at these levels.      5. previous laminectomy at the L3 level.      CT-TSPINE W/O PLUS RECONS   Final Result      1.  Osteopenia.      2.  No evidence acute fracture or subluxation of the thoracic spine.      3.  C7 laminectomy noted.      CT-CSPINE WITHOUT PLUS RECONS   Final Result      1.  Prior multilevel laminectomy and posterior fusion.   2.  Inferior articular process fracture on the RIGHT at C6-7.   3.  Potential fracture within bone graft material at the posterior margin of LEFT C5-6 facet with partial facet uncovering.   4.  Probable chronic moderate compression of C7.      These findings were electronically conveyed to and received by JUSTIN BRUNO on 3/4/2025 5:52 PM.         CT-CTA NECK WITH " & W/O-POST PROCESSING   Final Result      No focal high-grade stenosis, dissection or occlusion of the cervical carotid or vertebral arteries.      CT-CTA HEAD WITH & W/O-POST PROCESS   Final Result      CT angiogram of the [place] within normal limits.      DX-CHEST-PORTABLE (1 VIEW)   Final Result      No acute cardiopulmonary disease evident.      EC-ECHOCARDIOGRAM COMPLETE W/O CONT    (Results Pending)   MR-BRAIN-W/O    (Results Pending)       X-Ray:  I have personally reviewed the images and compared with prior images.  EKG:  I have personally reviewed the images and compared with prior images.    Assessment/Plan:  Justification for Admission Status  I anticipate this patient is appropriate for observation status at this time because syncope    Patient will need a Telemetry bed on MEDICAL service .  The need is secondary to syncope.    * Syncope, unspecified syncope type- (present on admission)  Assessment & Plan  Monitor on telemetry  Echocardiogram  MRI brain  Check orthostatic BP    T2DM (type 2 diabetes mellitus) ([facility])- (present on admission)  Assessment & Plan  Insulin sliding scale while in hospital  Continue home Lantus dosing  Check A1c    HTN (hypertension)- (present on admission)  Assessment & Plan  Continue home medication  As needed hydralazine for SBP greater than 180    COPD (chronic obstructive pulmonary disease) ([facility])- (present on admission)  Assessment & Plan  Not currently in exacerbation  Continue home medication    Cervical spondylosis with myelopathy- (present on admission)  Assessment & Plan  Cervical MRI per neurosurgery recommendation  Neurosurgery to consult on patient formally in a.m.  Pain control        VTE prophylaxis: SCDs/TEDs

## 2025-03-05 NOTE — PROGRESS NOTES
Patient taken to MRI by transport.   Please clarify if this patient is being treated/managed for:    =>Lactic acidosis  (lactic acid  3.8)  =>Other Explanation of clinical findings  =>Unable to Determine (no explanation of clinical findings)    The medical record reflects the following:    Risk: pt admitted with septic shock, uti, bp 94/49, bnp >50288    Clinical Indicators: lactic acid  3.8     Treatment:   ivf,  Abx, 02 , levophed     Please clarify and document your clinical opinion in the progress notes and discharge summary including the definitive and/or presumptive diagnosis, (suspected or probable), related to the above clinical findings. Please include clinical findings supporting your diagnosis. If you DECLINE this query or would like to communicate with [facility], please utilize the \"[facility] message box\" at the TOP of the Progress Note on the right.       Thank you,  Dawna Jenkins RN/CCDS  [phone]

## 2025-03-05 NOTE — ASSESSMENT & PLAN NOTE
MRI Cervical spine showing prevertebral phlegmon and discitis/osteo at C6-7   Dr Peralta notified who then saw the patient this afternoon  ID Consulted  ESR, CRP, Blood Cultures x2 Ordered   Hold off on abx until cultures result

## 2025-03-05 NOTE — ASSESSMENT & PLAN NOTE
Monitor on telemetry  5 beats v-tach this afternoon  Replace magnesium 2g IV  Echocardiogram normal  MRI brain normal  Orthostatics negative

## 2025-03-05 NOTE — PROGRESS NOTES
Neurosurgery Progress Note    Subjective:  I sent pt to ER yesterday from the office for neck pain and syncope  MRI completed that shows prevertebral phlegmon and discitis/osteo at C6-7  His most recent surgery was 2024 (removal C3-T1 hardware, then decompression c2-4 with extension of fusion up to C2.      Exam:  Grossly 5/ throughout  C/d/I      BP  Min: 133/83  Max: 207/96  Pulse  Av.6  Min: 52  Max: 88  Resp  Av.1  Min: 14  Max: 18  Temp  Av.7 °C (98 °F)  Min: 36.6 °C (97.8 °F)  Max: 36.8 °C (98.2 °F)  SpO2  Av.7 %  Min: 88 %  Max: 97 %    No data recorded    Recent Labs     25  1255 25  0623   WBC 7.1 7.2   RBC 5.12 4.88   HEMOGLOBIN 15.3 14.6   HEMATOCRIT 43.9 42.6   MCV 85.7 87.3   MCH 29.9 29.9   MCHC 34.9 34.3   RDW 42.5 43.7   PLATELETCT 224 194   MPV 9.7 9.0     Recent Labs     25  1255 25  0623   SODIUM 138 138   POTASSIUM 4.5 3.8   CHLORIDE 103 102   CO2 23 25   GLUCOSE 121* 98   BUN 12 12   CREATININE 0.94 1.01   CALCIUM 9.3 9.0               Intake/Output                         25 0700 - 25 0659 25 07 - 25 0659      Total  Total                 Intake    P.O.  --  120 120  --  -- --    P.O. -- 120 120 -- -- --    Total Intake -- 120 120 -- -- --       Output    Urine  --  725 725  --  -- --    Number of Times Voided -- 3 x 3 x -- -- --    Urine Void (mL) -- 725 725 -- -- --    Total Output -- 725 725 -- -- --       Net I/O     -- -605 -605 -- -- --              Intake/Output Summary (Last 24 hours) at 3/5/2025 1458  Last data filed at 3/5/2025 0600  Gross per 24 hour   Intake 120 ml   Output 725 ml   Net -605 ml             amLODIPine  5 mg DAILY    carvedilol  25 mg BID WITH MEALS    cyanocobalamin  100 mcg DAILY    mometasone-formoterol  2 Puff BID    insulin GLARGINE  26 Units Q EVENING    lisinopril  40 mg Q EVENING    mirtazapine  22.5 mg Nightly    omeprazole  20 mg BID     rosuvastatin  40 mg DAILY    tiotropium  5 mcg DAILY    acetaminophen  650 mg Q6HRS PRN    Pharmacy Consult Request  1 Each PHARMACY TO DOSE    hydrALAZINE  10 mg Q4HRS PRN    ondansetron  4 mg Q4HRS PRN    Or    ondansetron  4 mg Q4HRS PRN    insulin lispro  1-6 Units 4X/DAY ACHS    And    dextrose bolus  25 g Q15 MIN PRN    oxyCODONE immediate-release  5 mg Q3HRS PRN    Or    oxyCODONE immediate-release  10 mg Q3HRS PRN    Or    HYDROmorphone  0.5 mg Q3HRS PRN    lidocaine  2 Patch Q24HR    cyclobenzaprine  10 mg TID PRN       Assessment and Plan:  Hospital day # 2 syncope, now with C6-7 disc/osteo, prevert phlegmon  POD# na  Chemical prophylactic DVT therapy: Yes - Lovenox Dose 30mg/bid  Start date/time: today     Brain Compression: No    Wbc normal  Ordered esr, crp, blood cx  Called ID  No abx until after bl cx  Will follow closely  D/w pt

## 2025-03-05 NOTE — ED NOTES
Patient resting on stretcher in no acute distress. Equal chest rise noted. VS stable on monitor. Bed locked and in low position. Dontrell rudd within reach.

## 2025-03-05 NOTE — ED NOTES
Bedside report received from off going RN/tech: ellie rn, assumed care of patient.  POC discussed with patient. Call light within reach, all needs addressed at this time.       Fall risk interventions in place: Patient's personal possessions are with in their safe reach, Place fall risk sign on patient's door, and Keep floor surfaces clean and dry (all applicable per [place] Fall risk assessment)   Continuous monitoring: Cardiac Leads, Pulse Ox, or Blood Pressure  IVF/IV medications: Not Applicable   Oxygen: Room Air  Bedside sitter: Not Applicable   Isolation: Not Applicable

## 2025-03-05 NOTE — PROGRESS NOTES
Pt arrived to unit via gurney at 2050. Pt oriented to room, unit, and plan of care. Tele-monitor placed and monitor room notified. All questions answered at this time. Call light within reach; fall precautions in place.

## 2025-03-05 NOTE — PROGRESS NOTES
Daily Progress Note    Date of Service  3/5/2025    Chief Complaint  Pérez Willams is a 79 y.o. male admitted 3/4/2025 with neck pain and multiple falls.    Hospital Course  No notes on file  Pérez Willams is a 79 y.o. male who presented 3/4/2025 with fall and neck pain.     Is a 79-year-old male who presents to the emergency department with complaints of multiple ground-level falls and neck pain.  He has a past medical history of hypertension, COPD, diabetes, GERD, MI with stents, cholecystectomy and cervical fusion with Dr. Peralta.     Patient seen and examined at bedside.  Patient states that he has been having frequent falls over the last month.  He suffered from a ground-level fall a couple days ago with increase in neck pain which prompted him to be evaluated in the emergency department.  Patient states that he has had some dizziness that he associated with vertigo.  He states that these episodes often have no warning and he wakes on the ground.  He denies any bowel or bladder incontinence or history of seizures.  He states his PCP is concerned regarding his heart.  Patient is concerned regarding his neck pain.     In the emergency department, CBC, CMP, troponin, procalcitonin, TSH, CRP are all unremarkable.  COVID/flu/RSV negative.  Chest x-ray without cardiopulmonary disease.  CT/CTA head and neck are unremarkable.  CT L and T-spine without acute findings.  CT C-spine demonstrates inferior articular process fracture on the right at C6-7, potential fracture within bone graft material at the posterior margin of left C5-6 facet with partial facet uncovering.  CT findings were discussed with Dr. Grant from neurosurgery by ERP.  Recommend MRI cervical spine and soft c-collar as needed for pain.     Patient will be admitted to the observation unit and monitored on telemetry.  For the syncopal episodes he will undergo echocardiogram and MRI brain.  We will check orthostatic BPs.  MRI cervical  spine ordered per neurology recommendation.  Follow-up a.m. labs.    The patient was seen and examined this morning. The patient reports continued numbness and tingling to the BUE and reports some weakness to the upper extremities. Echo unremarkable. MRI findings significant for results suggestive of C6 and C7 osteomyelitis, C6-7 discitis, mild anterior epidural phlegmon at the level of C6 and C7, and prevertebral abscess and phlegmon. Notified neurosurgeon that completed original surgery and awaiting impression.    Interval Problem Update  Afebrile, VSS  MRI cervical spine shows abnormal results suggestive of C6 and C7 osteomyelitis, C6-7 discitis, mild anterior epidural phlegmon at the level of C6 and C7, and prevertebral abscess and phlegmon.  Neurosurgery consulted, pending  NPO status  Echo unremarkable, LVEF 55%    I have discussed this patient's plan of care and discharge plan at IDT rounds today with Case Management, Nursing, Nursing leadership, and other members of the IDT team.    Consultants/Specialty  neurosurgery    Code Status  Full Code    Disposition  Medically Cleared  I have placed the appropriate orders for post-discharge needs.    Review of Systems  Review of Systems   Constitutional:  Negative for chills and fever.   HENT: Negative.     Eyes: Negative.    Respiratory:  Negative for cough, sputum production, shortness of breath and wheezing.    Cardiovascular:  Negative for chest pain and palpitations.   Gastrointestinal:  Negative for abdominal pain, nausea and vomiting.   Genitourinary: Negative.    Musculoskeletal:  Positive for back pain, falls and neck pain.   Skin: Negative.    Neurological:  Positive for tingling, sensory change and focal weakness.        N/t to BUE; feels weaker in upper extremities   Psychiatric/Behavioral: Negative.          Physical Exam  Temp:  [36.6 °C (97.8 °F)-36.8 °C (98.2 °F)] 36.6 °C (97.9 °F)  Pulse:  [52-88] 61  Resp:  [14-18] 16  BP: (133-207)/()  133/83  SpO2:  [88 %-97 %] 94 %    Physical Exam  Constitutional:       Appearance: Normal appearance.   HENT:      Head: Normocephalic and atraumatic.      Nose: Nose normal.      Mouth/Throat:      Mouth: Mucous membranes are moist.      Pharynx: Oropharynx is clear.   Eyes:      Pupils: Pupils are equal, round, and reactive to light.   Cardiovascular:      Rate and Rhythm: Normal rate and regular rhythm.      Pulses: Normal pulses.      Heart sounds: Normal heart sounds.   Pulmonary:      Effort: Pulmonary effort is normal.      Breath sounds: Normal breath sounds.   Abdominal:      General: Bowel sounds are normal.      Palpations: Abdomen is soft.   Musculoskeletal:         General: Normal range of motion.      Cervical back: Neck supple. Tenderness present.   Skin:     Capillary Refill: Capillary refill takes less than 2 seconds.   Neurological:      Mental Status: He is alert and oriented to person, place, and time.      Comments: Equal strength to BUE and BLE   Psychiatric:         Mood and Affect: Mood normal.         Behavior: Behavior normal.         Fluids    Intake/Output Summary (Last 24 hours) at 3/5/2025 1403  Last data filed at 3/5/2025 0600  Gross per 24 hour   Intake 120 ml   Output 725 ml   Net -605 ml        Laboratory  Recent Labs     03/04/25  1255 03/05/25  0623   WBC 7.1 7.2   RBC 5.12 4.88   HEMOGLOBIN 15.3 14.6   HEMATOCRIT 43.9 42.6   MCV 85.7 87.3   MCH 29.9 29.9   MCHC 34.9 34.3   RDW 42.5 43.7   PLATELETCT 224 194   MPV 9.7 9.0     Recent Labs     03/04/25  1255 03/05/25  0623   SODIUM 138 138   POTASSIUM 4.5 3.8   CHLORIDE 103 102   CO2 23 25   GLUCOSE 121* 98   BUN 12 12   CREATININE 0.94 1.01   CALCIUM 9.3 9.0                   Imaging  MR-CERVICAL SPINE-WITH & W/O   Final Result      1.  There are postsurgical changes as evidenced by posterior fusion of C2, C3 and C4 with transpedicular screws and posterior decompression from C2 to C7. There is abnormal edema and contrast  enhancement noted involving C6 and C7 vertebral bodies.    Abnormal enhancing epidural phlegmon is noted at C6 and C7. There is also abnormal enhancing prevertebral phlegmon at the levels of C6 and C7. There is also linear nonenhancing prevertebral fluid collection with surrounding contrast enhancement noted    extending from C1 to C7.   2.  These findings likely represent C6 and C7 osteomyelitis, C6-7 discitis, mild anterior epidural phlegmon at the levels of C6 and C7 and anterior prevertebral abscess and phlegmon.   3.  There is no significant canal compromise.   4.  These findings are new since the previous MRI dated 8/24/2024.      MR-BRAIN-W/O   Final Result      1.  No acute abnormality.   2.  Mild chronic microvascular ischemic disease.      EC-ECHOCARDIOGRAM COMPLETE W/O CONT   Final Result      CT-LSPINE W/O PLUS RECONS   Final Result         1. Osteopenia.      2. No evidence for acute fracture or subluxation lumbar spine.      3. Moderate osteoarthritic changes at the L3-4 level with mild to moderate osteoarthritic changes at the L1-2, L2-3, and L5-S1 levels.      4. Mild lumbar spondylotic changes at the L2-3 and L3-4 levels with evidence of mild central canal stenosis at these levels.      5. previous laminectomy at the L3 level.      CT-TSPINE W/O PLUS RECONS   Final Result      1.  Osteopenia.      2.  No evidence acute fracture or subluxation of the thoracic spine.      3.  C7 laminectomy noted.      CT-CSPINE WITHOUT PLUS RECONS   Final Result      1.  Prior multilevel laminectomy and posterior fusion.   2.  Inferior articular process fracture on the RIGHT at C6-7.   3.  Potential fracture within bone graft material at the posterior margin of LEFT C5-6 facet with partial facet uncovering.   4.  Probable chronic moderate compression of C7.      These findings were electronically conveyed to and received by JUSTIN BRUNO on 3/4/2025 5:52 PM.         CT-CTA NECK WITH & W/O-POST PROCESSING    Final Result      No focal high-grade stenosis, dissection or occlusion of the cervical carotid or vertebral arteries.      CT-CTA HEAD WITH & W/O-POST PROCESS   Final Result      CT angiogram of the [facility] within normal limits.      DX-CHEST-PORTABLE (1 VIEW)   Final Result      No acute cardiopulmonary disease evident.           Assessment/Plan     * Syncope, unspecified syncope type- (present on admission)  Assessment & Plan  Monitor on telemetry  Echocardiogram, no significant abnormalities, LVEF 55%  MRI brain, no acute abnormalities  Orthostatic BP negative     Osteomyelitis, discitis, and mild epidural phlegmon at C6 and C7; Anterior prevertebral abscess and phlegmon (HCC)- (present on admission)  Assessment & Plan  Found on MRI cervical spine  Neurosurgery consult  NPO for possible surgical intervention  Plan to admit to floor    T2DM (type 2 diabetes mellitus) (HCC)- (present on admission)  Assessment & Plan  Insulin sliding scale while in hospital  Continue home Lantus dosing  Check A1c     HTN (hypertension)- (present on admission)  Assessment & Plan  Continue home medication  As needed hydralazine for SBP greater than 180     COPD (chronic obstructive pulmonary disease) (HCC)- (present on admission)  Assessment & Plan  Not currently in exacerbation  Continue home medication     Cervical spondylosis with myelopathy- (present on admission)  Assessment & Plan  Cervical MRI per neurosurgery recommendation, completed see above  Neurosurgery consult  Pain control    VTE prophylaxis: VTE Selection    I have performed a physical exam and reviewed and updated ROS and Plan today (3/5/2025). In review of yesterday's note (3/4/2025), there are no changes except as documented above.

## 2025-03-05 NOTE — ASSESSMENT & PLAN NOTE
Cervical MRI obtained  Dr Peralta notified of results showing prevertebral phlegmon and discitis/osteo at C6-7   Pain control

## 2025-03-05 NOTE — PROGRESS NOTES
Case d/w Dr Archer.  Pt being admitted for syncope w/u.  CT C, T and L spine:  DJD.  ? Fx thru old posterior lateral fusion mass at C7.  Rec soft C collar PRN.  Dr Peralta will see in am.   As having arm Sx, C MRI ordered.

## 2025-03-05 NOTE — PROGRESS NOTES
2 RN Skin Assessment Completed.     Head: WDL  Ears: WDL  Nose: WDL  Mouth: missing teeth dentures in place  Neck: WDL lidocaine patch in place  Breasts/Chest: WDL  Shoulder Blades: WDL  Spine: WDL lidocaine patch in place lower back  (R) Arm/Elbow/hand: WDL except healed scars   (L) Arm/Elbow/hand: WDL  Abdomen:WDL  Groin: WDL  Sacrum/Coccyx/Buttocks: blanching  (R) Leg: WDL  (L) Leg: WDL  (R) Heel/Foot/Toe: blanching dry flaky  (L) Heel/Foot/Toe: blanching dry flaky              Devices in place: Pulse Ox     Interventions in place: Pillows     Possible skin injury found: No     Pictures uploaded into Epic: N/A  Wound Consult Placed: N/A

## 2025-03-05 NOTE — RESPIRATORY CARE
"COPD EDUCATION by COPD CLINICAL EDUCATOR  3/5/2025  at  2:06 PM by Joelle Connolly, RRT     Patient interviewed by education team.  Patient declined or is unable to participate in the full program.  Therefore, a short intervention has been conducted.  A comprehensive packet including information about COPD, types of treatments to manage their disease and safe home Oxygen usage was provided and reviewed with patient at the bedside.        Mr. Willams endorses breathing well today. He quit smoking in 1983. He remembers most of the COPD education he received in Septemeber 2024.  We discussed instances that he might use his rescue inhaler. Declined spacer as he has one at home.    COPD Screen       COPD Assessment  COPD Clinical Specialists ONLY  COPD Education Initiated: Yes--Short Intervention (COPD book, quit smoking in 1983, no home O2- takes Spiriva /Advair at home)  Is this a COPD exacerbation patient?: No  DME Company: none prior  Physician Follow Up Appointment:  (tbd)  Physician Name: Quentin Baker N.P.  Pulmonologist Name: none prior  Palliative Care: Yes  Durable Power of : No  Advance Directive: No  Discussion with others: No  Is POLST on file?: No  Referrals Initiated: Yes  Pulmonary Rehab: No  Smoking Cessation:  (quit 1983)  Hospice: No  Home Health Care: No  Mobile Urgent Care Services: No  Geriatric Specialty Group: No  Private In-Home Care Agency: No  (OP) Pulmonary Function Testing:  (pt states yes but unable to locate in chart)    PFT Results    No results found for: \"PFT\"    Meds to Beds  [facility] provides bedside medication delivery for all eligible patients at discharge and you have been automatically enrolled in the Meds to Beds Program. Would you like to opt out of this program for any reason?: Yes - Opt Out  Reason the patient would like to opt out of Bedside Medication Delivery at Discharge?: Patient prefers another pharmacy     MY COPD ACTION PLAN     It is recommended that patients and " "physicians/healthcare providers complete this action plan together. This plan should be discussed at each physician visit and updated as needed.    The green, yellow and red zones show groups of symptoms of COPD. This list of symptoms is not comprehensive, and you may experience other symptoms. In the \"Actions\" column, your healthcare provider has recommended actions for you to take based on your symptoms.    Patient Name: Pérez Willams   YOB: 1945   Last Updated on: 3/5/2025  2:05 PM   Green Zone:  I am doing well today Actions     Usual activitiy and exercise level   Take daily medications     Usual amounts of cough and phlegm/mucus   Use oxygen as prescribed     Sleep well at night   Continue regular exercise/diet plan     Appetite is good   At all times avoid cigarette smoke, inhaled irritants     Daily Medications (these medications are taken every day):   Tiotropium Bromide Monohydrate (Spiriva)  Fluticosone/Salmeterol (Advair)   1 Puff Once daily  Twice daily     Additional Information:  Please rinse mouth out after taking    Yellow Zone:  I am having a bad day or a COPD flare Actions     More breathless than usual   Continue daily medications     I have less energy for my daily activities   Use quick relief inhaler as ordered     Increased or thicker phlegm/mucus   Use oxygen as prescribed     Using quick relief inhaler/nebulizer more often   Get plenty of rest     Swelling of ankles more than usual   Use pursed lip breathing     More coughing than usual   At all times avoid cigarette smoke, inhaled irritants     I feel like I have a \"chest cold\"     Poor sleep and my symptoms woke me up     My appetite is not good     My medicine is not helping      Call provider immediately if symptoms don’t improve     Continue daily medications, add rescue medications:   Albuterol 2 Puffs Twice Daily PRN       Medications to be used during a flare up, (as Discussed with Provider):       "     Additional Information:  Use with spacer    Red Zone:  I need urgent medical care Actions     Severe shortness of breath even at rest   Call 911 or seek medical care immediately     Not able to do any activity because of breathing      Fever or shaking chills      Feeling confused or very drowsy       Chest pains      Coughing up blood

## 2025-03-05 NOTE — HOSPITAL COURSE
Pérez Willams is a 79 y.o. male with a past medical history of hypertension, COPD, diabetes, GERD, MI with stents, cholecystectomy and cervical fusion with Dr. Peralta who presented 3/4/2025 with fall and neck pain. Reported to have multiple syncopal episodes recently.   In the emergency department, CBC, CMP, troponin, procalcitonin, TSH, CRP are all unremarkable.  COVID/flu/RSV negative.  Chest x-ray without cardiopulmonary disease.  CT/CTA head and neck are unremarkable.  CT L and T-spine without acute findings.  CT C-spine demonstrates inferior articular process fracture on the right at C6-7, potential fracture within bone graft material at the posterior margin of left C5-6 facet with partial facet uncovering.  CT findings were discussed with Dr. Grant from neurosurgery by ERP. MRI cervical spine completed. Per neurosurgery no concern for active discitis or osteomyelitis, no antibiotics recommended. Will repeat cervical MRI in 6 weeks. In regards to his syncopal episodes no arterial stenosis noted on imaging, 2D echo with no valvular abnormalities or outflow obstruction. Orthostatic vitals negative, no oxygen requirements on ambulating O2 testing. Patient improved clinically and is agreeable to discharge. Patient was determined satisfactory for discharge with appropriate follow up.

## 2025-03-05 NOTE — CARE PLAN
The patient is Stable - Low risk of patient condition declining or worsening    Shift Goals  Clinical Goals: neurosurgery consult in AM, tele monitoring, orthos  Patient Goals: fix neck, less pain  Family Goals: PATRICIO    Progress made toward(s) clinical / shift goals:    Problem: Knowledge Deficit - Standard  Goal: Patient and family/care givers will demonstrate understanding of plan of care, disease process/condition, diagnostic tests and medications  Description: Target End Date:  1-3 days or as soon as patient condition allows    1.  Patient and family/caregiver oriented to unit, equipment, visitation policy and means for communicating concern  2.  Complete/review Learning Assessment  3.  Assess knowledge level of disease process/condition, treatment plan, diagnostic tests and medications  4.  Explain disease process/condition, treatment plan, diagnostic tests and medications  Outcome: Progressing     Problem: Fall Risk  Goal: Patient will remain free from falls  Description: Target End Date:  Prior to discharge or change in level of care    1.  Assess for fall risk factors  2.  Implement fall precautions  Outcome: Progressing     Problem: Pain - Standard  Goal: Alleviation of pain or a reduction in pain to the patient’s comfort goal  Description: Target End Date:  Prior to discharge or change in level of care    1.  Document pain using the appropriate pain scale per order or unit policy  2.  Educate and implement non-pharmacologic comfort measures (i.e. relaxation, distraction, massage, cold/heat therapy, etc.)  3.  Pain management medications as ordered  4.  Reassess pain after pain med administration per policy  5.  If opiods administered assess patient's response to pain medication is appropriate per POSS sedation scale  6.  Follow pain management plan developed in collaboration with patient and interdisciplinary team (including palliative care or pain specialists if applicable)  Outcome: Progressing

## 2025-03-05 NOTE — PROGRESS NOTES
[facility] Medicine Daily Progress Note    Date of Service  3/5/2025    Chief Complaint  Pérez Willams is a 79 y.o. male admitted 3/4/2025 with neck pain.    Hospital Course  Pérez Willams is a 79 y.o. male with a past medical history of hypertension, COPD, diabetes, GERD, MI with stents, cholecystectomy and cervical fusion with Dr. Peralta who presented 3/4/2025 with fall and neck pain.     Patient states that he has been having frequent falls over the last month.  He suffered from a ground-level fall a couple days ago with increase in neck pain which prompted him to be evaluated in the emergency department.  Patient states that he has had some dizziness that he associated with vertigo.  He states that these episodes often have no warning and he wakes on the ground.  He denies any bowel or bladder incontinence or history of seizures.  He states his PCP is concerned regarding his heart.  Patient is concerned regarding his neck pain.     In the emergency department, CBC, CMP, troponin, procalcitonin, TSH, CRP are all unremarkable.  COVID/flu/RSV negative.  Chest x-ray without cardiopulmonary disease.  CT/CTA head and neck are unremarkable.  CT L and T-spine without acute findings.  CT C-spine demonstrates inferior articular process fracture on the right at C6-7, potential fracture within bone graft material at the posterior margin of left C5-6 facet with partial facet uncovering.  CT findings were discussed with Dr. Grant from neurosurgery by ERP.  Recommend MRI cervical spine and soft c-collar as needed for pain.     Patient seen and examined this a.m.  He endorses having significant neck pain.  I discussed with him the results of his cervical MRI.  Additionally discussed with him that Dr. Tolbert has been notified and will come see him and give further recommendations based on the imaging results.    Interval Problem Update  Patient having continued neck pain.  Does not have any fevers or elevated  white blood cell count at this time.  -Plan of care: Infectious disease consulted.  ESR, CRP, blood cultures x 2 ordered.  Pain control.  Neurosurgery on board pending further recommendations.    I have discussed this patient's plan of care and discharge plan at IDT rounds today with Case Management, Nursing, Nursing leadership, and other members of the IDT team.    Consultants/Specialty  infectious disease and neurosurgery    Code Status  Full Code    Disposition  The patient is not medically cleared for discharge to home or a post-acute facility.  Anticipate discharge to: home with close outpatient follow-up    I have placed the appropriate orders for post-discharge needs.    Review of Systems  Review of Systems   Constitutional:  Negative for chills, fever and malaise/fatigue.   HENT:  Negative for congestion and sore throat.    Eyes:  Negative for blurred vision and double vision.   Respiratory:  Negative for cough and shortness of breath.    Cardiovascular:  Negative for chest pain, palpitations and leg swelling.   Gastrointestinal:  Negative for abdominal pain, nausea and vomiting.   Genitourinary:  Negative for flank pain and hematuria.   Musculoskeletal:  Positive for joint pain.        Neck Pain   Skin:  Negative for itching and rash.   Neurological:  Negative for dizziness, weakness and headaches.   Psychiatric/Behavioral:  Negative for depression and suicidal ideas.         Physical Exam  Temp:  [36.6 °C (97.8 °F)-36.8 °C (98.2 °F)] 36.6 °C (97.9 °F)  Pulse:  [52-88] 61  Resp:  [14-18] 14  BP: (133-207)/() 133/83  SpO2:  [88 %-97 %] 94 %    Physical Exam  Constitutional:       Appearance: Normal appearance.   HENT:      Head: Normocephalic and atraumatic.      Nose: Nose normal.      Mouth/Throat:      Mouth: Mucous membranes are moist.   Eyes:      Pupils: Pupils are equal, round, and reactive to light.   Cardiovascular:      Rate and Rhythm: Normal rate and regular rhythm.      Pulses: Normal  pulses.      Heart sounds: Normal heart sounds.   Pulmonary:      Effort: Pulmonary effort is normal.      Breath sounds: Normal breath sounds.   Abdominal:      General: Bowel sounds are normal.      Palpations: Abdomen is soft.   Musculoskeletal:         General: Normal range of motion.      Cervical back: Normal range of motion.   Skin:     General: Skin is warm and dry.   Neurological:      General: No focal deficit present.      Mental Status: He is alert. Mental status is at baseline.   Psychiatric:         Mood and Affect: Mood normal.         Behavior: Behavior normal.         Fluids    Intake/Output Summary (Last 24 hours) at 3/5/2025 1547  Last data filed at 3/5/2025 0600  Gross per 24 hour   Intake 120 ml   Output 725 ml   Net -605 ml        Laboratory  Recent Labs     03/04/25  1255 03/05/25  0623   WBC 7.1 7.2   RBC 5.12 4.88   HEMOGLOBIN 15.3 14.6   HEMATOCRIT 43.9 42.6   MCV 85.7 87.3   MCH 29.9 29.9   MCHC 34.9 34.3   RDW 42.5 43.7   PLATELETCT 224 194   MPV 9.7 9.0     Recent Labs     03/04/25  1255 03/05/25  0623   SODIUM 138 138   POTASSIUM 4.5 3.8   CHLORIDE 103 102   CO2 23 25   GLUCOSE 121* 98   BUN 12 12   CREATININE 0.94 1.01   CALCIUM 9.3 9.0                   Imaging  MR-CERVICAL SPINE-WITH & W/O   Final Result      1.  There are postsurgical changes as evidenced by posterior fusion of C2, C3 and C4 with transpedicular screws and posterior decompression from C2 to C7. There is abnormal edema and contrast enhancement noted involving C6 and C7 vertebral bodies.    Abnormal enhancing epidural phlegmon is noted at C6 and C7. There is also abnormal enhancing prevertebral phlegmon at the levels of C6 and C7. There is also linear nonenhancing prevertebral fluid collection with surrounding contrast enhancement noted    extending from C1 to C7.   2.  These findings likely represent C6 and C7 osteomyelitis, C6-7 discitis, mild anterior epidural phlegmon at the levels of C6 and C7 and anterior  prevertebral abscess and phlegmon.   3.  There is no significant canal compromise.   4.  These findings are new since the previous MRI dated 8/24/2024.      MR-BRAIN-W/O   Final Result      1.  No acute abnormality.   2.  Mild chronic microvascular ischemic disease.      EC-ECHOCARDIOGRAM COMPLETE W/O CONT   Final Result      CT-LSPINE W/O PLUS RECONS   Final Result         1. Osteopenia.      2. No evidence for acute fracture or subluxation lumbar spine.      3. Moderate osteoarthritic changes at the L3-4 level with mild to moderate osteoarthritic changes at the L1-2, L2-3, and L5-S1 levels.      4. Mild lumbar spondylotic changes at the L2-3 and L3-4 levels with evidence of mild central canal stenosis at these levels.      5. previous laminectomy at the L3 level.      CT-TSPINE W/O PLUS RECONS   Final Result      1.  Osteopenia.      2.  No evidence acute fracture or subluxation of the thoracic spine.      3.  C7 laminectomy noted.      CT-CSPINE WITHOUT PLUS RECONS   Final Result      1.  Prior multilevel laminectomy and posterior fusion.   2.  Inferior articular process fracture on the RIGHT at C6-7.   3.  Potential fracture within bone graft material at the posterior margin of LEFT C5-6 facet with partial facet uncovering.   4.  Probable chronic moderate compression of C7.      These findings were electronically conveyed to and received by JUSTIN BRUNO on 3/4/2025 5:52 PM.         CT-CTA NECK WITH & W/O-POST PROCESSING   Final Result      No focal high-grade stenosis, dissection or occlusion of the cervical carotid or vertebral arteries.      CT-CTA HEAD WITH & W/O-POST PROCESS   Final Result      CT angiogram of the [place] within normal limits.      DX-CHEST-PORTABLE (1 VIEW)   Final Result      No acute cardiopulmonary disease evident.           Assessment/Plan  * Syncope, unspecified syncope type- (present on admission)  Assessment & Plan  Monitor on telemetry  5 beats v-tach this  afternoon  Replace magnesium 2g IV  Echocardiogram normal  MRI brain normal  Orthostatics negative    Osteomyelitis of cervical spine ([facility])  Assessment & Plan  MRI Cervical spine showing prevertebral phlegmon and discitis/osteo at C6-7   Dr Peralta notified who then saw the patient this afternoon  ID Consulted  ESR, CRP, Blood Cultures x2 Ordered   Hold off on abx until cultures result    T2DM (type 2 diabetes mellitus) ([facility])- (present on admission)  Assessment & Plan  Insulin sliding scale while in hospital  Continue home Lantus dosing  A1c 7.0    HTN (hypertension)- (present on admission)  Assessment & Plan  Continue home medication  As needed hydralazine for SBP greater than 180    COPD (chronic obstructive pulmonary disease) ([facility])- (present on admission)  Assessment & Plan  Not currently in exacerbation  Continue home medication    Cervical spondylosis with myelopathy- (present on admission)  Assessment & Plan  Cervical MRI obtained  Dr Peralta notified of results showing prevertebral phlegmon and discitis/osteo at C6-7   Pain control       VTE prophylaxis:    enoxaparin ppx      I have performed a physical exam and reviewed and updated ROS and Plan today (3/5/2025). In review of yesterday's note (3/4/2025), there are no changes except as documented above.      I, TINA Mao performed a substantiated portion of the service face-to-face with same patient on the same date of service INDEPENDENTLY FROM THE MD ON ASSESSMENT, EXAMINATION, AND DISCUSSION IN PLAN OF CARE FOR 23 MINUTES.  I was personally involved in reviewing and conducting the medical decision making, including the information as described below:

## 2025-03-05 NOTE — PROGRESS NOTES
Pt c/o onset of dizziness and 'feeling foggy and weird after receiving morning medicine'. /100, HR 55-62, pt placed on 3L NC for O2 sat 87-88. MD severino notified. Stat EKG orders received. Bedside report to Armando LORENZO.

## 2025-03-05 NOTE — ED NOTES
Patient resting on stretcher in no acute distress. Equal chest rise noted. VS stable on monitor. Bed locked and in low position. Call bell within reach.

## 2025-03-06 VITALS
TEMPERATURE: 97.7 F | HEART RATE: 73 BPM | HEIGHT: 70 IN | SYSTOLIC BLOOD PRESSURE: 139 MMHG | WEIGHT: 196.87 LBS | DIASTOLIC BLOOD PRESSURE: 76 MMHG | RESPIRATION RATE: 16 BRPM | BODY MASS INDEX: 28.18 KG/M2 | OXYGEN SATURATION: 92 %

## 2025-03-06 LAB
ANION GAP SERPL CALC-SCNC: 10 MMOL/L (ref 7–16)
BUN SERPL-MCNC: 15 MG/DL (ref 8–22)
CALCIUM SERPL-MCNC: 9.2 MG/DL (ref 8.5–10.5)
CHLORIDE SERPL-SCNC: 102 MMOL/L (ref 96–112)
CO2 SERPL-SCNC: 26 MMOL/L (ref 20–33)
CREAT SERPL-MCNC: 0.94 MG/DL (ref 0.5–1.4)
CRP SERPL HS-MCNC: 0.38 MG/DL (ref 0–0.75)
EKG IMPRESSION: NORMAL
ERYTHROCYTE [DISTWIDTH] IN BLOOD BY AUTOMATED COUNT: 41.5 FL (ref 35.9–50)
GFR SERPLBLD CREATININE-BSD FMLA CKD-EPI: 83 ML/MIN/1.73 M 2
GLUCOSE BLD STRIP.AUTO-MCNC: 115 MG/DL (ref 65–99)
GLUCOSE BLD STRIP.AUTO-MCNC: 120 MG/DL (ref 65–99)
GLUCOSE BLD STRIP.AUTO-MCNC: 98 MG/DL (ref 65–99)
GLUCOSE SERPL-MCNC: 128 MG/DL (ref 65–99)
HCT VFR BLD AUTO: 42 % (ref 42–52)
HGB BLD-MCNC: 14.5 G/DL (ref 14–18)
MAGNESIUM SERPL-MCNC: 1.9 MG/DL (ref 1.5–2.5)
MCH RBC QN AUTO: 29.2 PG (ref 27–33)
MCHC RBC AUTO-ENTMCNC: 34.5 G/DL (ref 32.3–36.5)
MCV RBC AUTO: 84.5 FL (ref 81.4–97.8)
PLATELET # BLD AUTO: 195 K/UL (ref 164–446)
PMV BLD AUTO: 9.6 FL (ref 9–12.9)
POTASSIUM SERPL-SCNC: 4 MMOL/L (ref 3.6–5.5)
RBC # BLD AUTO: 4.97 M/UL (ref 4.7–6.1)
SODIUM SERPL-SCNC: 138 MMOL/L (ref 135–145)
WBC # BLD AUTO: 6.4 K/UL (ref 4.8–10.8)

## 2025-03-06 PROCEDURE — 700102 HCHG RX REV CODE 250 W/ 637 OVERRIDE(OP)

## 2025-03-06 PROCEDURE — 700111 HCHG RX REV CODE 636 W/ 250 OVERRIDE (IP)

## 2025-03-06 PROCEDURE — 82962 GLUCOSE BLOOD TEST: CPT

## 2025-03-06 PROCEDURE — A9270 NON-COVERED ITEM OR SERVICE: HCPCS

## 2025-03-06 PROCEDURE — 99239 HOSP IP/OBS DSCHRG MGMT >30: CPT | Performed by: INTERNAL MEDICINE

## 2025-03-06 PROCEDURE — 83735 ASSAY OF MAGNESIUM: CPT

## 2025-03-06 PROCEDURE — 93010 ELECTROCARDIOGRAM REPORT: CPT | Performed by: INTERNAL MEDICINE

## 2025-03-06 PROCEDURE — 700111 HCHG RX REV CODE 636 W/ 250 OVERRIDE (IP): Mod: TB

## 2025-03-06 PROCEDURE — 80048 BASIC METABOLIC PNL TOTAL CA: CPT

## 2025-03-06 PROCEDURE — 86140 C-REACTIVE PROTEIN: CPT

## 2025-03-06 PROCEDURE — 85027 COMPLETE CBC AUTOMATED: CPT

## 2025-03-06 PROCEDURE — 36415 COLL VENOUS BLD VENIPUNCTURE: CPT

## 2025-03-06 RX ORDER — LIDOCAINE 4 G/G
2 PATCH TOPICAL EVERY 24 HOURS
Qty: 10 PATCH | Refills: 1 | Status: SHIPPED | OUTPATIENT
Start: 2025-03-07 | End: 2025-03-20

## 2025-03-06 RX ORDER — CYCLOBENZAPRINE HCL 10 MG
10 TABLET ORAL 3 TIMES DAILY PRN
Qty: 30 TABLET | Refills: 0 | Status: SHIPPED | OUTPATIENT
Start: 2025-03-06

## 2025-03-06 RX ADMIN — HYDROMORPHONE HYDROCHLORIDE 0.5 MG: 1 INJECTION, SOLUTION INTRAMUSCULAR; INTRAVENOUS; SUBCUTANEOUS at 03:54

## 2025-03-06 RX ADMIN — MOMETASONE FUROATE AND FORMOTEROL FUMARATE DIHYDRATE 2 PUFF: 200; 5 AEROSOL RESPIRATORY (INHALATION) at 08:15

## 2025-03-06 RX ADMIN — TIOTROPIUM BROMIDE INHALATION SPRAY 5 MCG: 3.12 SPRAY, METERED RESPIRATORY (INHALATION) at 08:16

## 2025-03-06 RX ADMIN — MOMETASONE FUROATE AND FORMOTEROL FUMARATE DIHYDRATE 2 PUFF: 200; 5 AEROSOL RESPIRATORY (INHALATION) at 00:12

## 2025-03-06 RX ADMIN — OXYCODONE HYDROCHLORIDE 5 MG: 5 TABLET ORAL at 06:24

## 2025-03-06 RX ADMIN — ENOXAPARIN SODIUM 30 MG: 100 INJECTION SUBCUTANEOUS at 06:24

## 2025-03-06 RX ADMIN — OMEPRAZOLE 20 MG: 20 CAPSULE, DELAYED RELEASE ORAL at 08:15

## 2025-03-06 RX ADMIN — AMLODIPINE BESYLATE 5 MG: 5 TABLET ORAL at 06:25

## 2025-03-06 RX ADMIN — ROSUVASTATIN CALCIUM 40 MG: 20 TABLET, FILM COATED ORAL at 06:46

## 2025-03-06 RX ADMIN — VITAM B12 100 MCG: 100 TAB at 06:24

## 2025-03-06 RX ADMIN — OXYCODONE HYDROCHLORIDE 10 MG: 10 TABLET ORAL at 11:52

## 2025-03-06 RX ADMIN — OXYCODONE HYDROCHLORIDE 10 MG: 10 TABLET ORAL at 00:13

## 2025-03-06 RX ADMIN — MIRTAZAPINE 22.5 MG: 15 TABLET, FILM COATED ORAL at 00:12

## 2025-03-06 RX ADMIN — CARVEDILOL 25 MG: 25 TABLET, FILM COATED ORAL at 08:15

## 2025-03-06 ASSESSMENT — PAIN DESCRIPTION - PAIN TYPE
TYPE: ACUTE PAIN

## 2025-03-06 ASSESSMENT — FIBROSIS 4 INDEX: FIB4 SCORE: 2.81

## 2025-03-06 NOTE — CARE PLAN
The patient is Stable - Low risk of patient condition declining or worsening    Shift Goals  Clinical Goals: pain control, monitor lab results/VS, ID/neurosurg consults, update POC  Patient Goals: rest, pain control, update POC  Family Goals: PATRICIO    Progress made toward(s) clinical / shift goals:    Problem: Knowledge Deficit - Standard  Goal: Patient and family/care givers will demonstrate understanding of plan of care, disease process/condition, diagnostic tests and medications  Description: Target End Date:  1-3 days or as soon as patient condition allows    Document in Patient Education    1.  Patient and family/caregiver oriented to unit, equipment, visitation policy and means for communicating concern  2.  Complete/review Learning Assessment  3.  Assess knowledge level of disease process/condition, treatment plan, diagnostic tests and medications  4.  Explain disease process/condition, treatment plan, diagnostic tests and medications  Outcome: Progressing  Note: Discuss POC with patient  Address questions and concerns, escalate as appropriate  Check for pt understanding of POC       Problem: Fall Risk  Goal: Patient will remain free from falls  Description: Target End Date:  Prior to discharge or change in level of care    Document interventions on the [facility] Fall Risk Assessment    1.  Assess for fall risk factors  2.  Implement fall precautions  Outcome: Progressing  Note: Fall risk assessment   Fall precautions in place per unit standard and protocol; see flowsheet       Problem: Pain - Standard  Goal: Alleviation of pain or a reduction in pain to the patient’s comfort goal  Description: Target End Date:  Prior to discharge or change in level of care    Document on Vitals flowsheet    1.  Document pain using the appropriate pain scale per order or unit policy  2.  Educate and implement non-pharmacologic comfort measures (i.e. relaxation, distraction, massage, cold/heat therapy, etc.)  3.  Pain  management medications as ordered  4.  Reassess pain after pain med administration per policy  5.  If opiods administered assess patient's response to pain medication is appropriate per POSS sedation scale  6.  Follow pain management plan developed in collaboration with patient and interdisciplinary team (including palliative care or pain specialists if applicable)  Outcome: Progressing  Note: Assess pain per unit standard and as needed  Provide comfort measures as appropriate; see flowsheet  Administer medications as ordered         Patient is not progressing towards the following goals:

## 2025-03-06 NOTE — PROGRESS NOTES
1830 - pt transported to room on SMT via WC with RN. Standing weight and VS obtained. Bed alarm engaged.

## 2025-03-06 NOTE — PROGRESS NOTES
4 Eyes Skin Assessment Completed by BJ Hawkins and BJ Garrett.    Head WDL  Ears WDL  Nose WDL  Mouth WDL  Neck WDL  Breast/Chest WDL  Shoulder Blades WDL  Spine Scar    (R) Arm/Elbow/Hand WDL  (L) Arm/Elbow/Hand Scab from eczema      Abdomen WDL  Groin WDL  Scrotum/Coccyx/Buttocks WDL  (R) Leg WDL  (L) Leg WDL  (R) Heel/Foot/Toe WDL  (L) Heel/Foot/Toe WDL      Devices In Places n/a      Interventions In Place N/A    Possible Skin Injury No    Pictures Uploaded Into Epic N/A  Wound Consult Placed N/A  RN Wound Prevention Protocol Ordered No

## 2025-03-06 NOTE — PROGRESS NOTES
Neurosurgery Progress Note    Subjective:  Pt seen in conjunction with Dr. Peralta  He discussed case again with Dr. Wu this am - not concerned for infection d/t normal labs. No abx recommended  Pt c/o neck pain and intermittently forearm pain       Exam:   throughout  C/d/I      BP  Min: 133/83  Max: 163/86  Pulse  Av.4  Min: 60  Max: 78  Resp  Avg: 15.9  Min: 14  Max: 17  Temp  Av.6 °C (97.8 °F)  Min: 36.3 °C (97.3 °F)  Max: 36.7 °C (98.1 °F)  SpO2  Av %  Min: 90 %  Max: 94 %    No data recorded    Recent Labs     25  1255 25  0623 25  0330   WBC 7.1 7.2 6.4   RBC 5.12 4.88 4.97   HEMOGLOBIN 15.3 14.6 14.5   HEMATOCRIT 43.9 42.6 42.0   MCV 85.7 87.3 84.5   MCH 29.9 29.9 29.2   MCHC 34.9 34.3 34.5   RDW 42.5 43.7 41.5   PLATELETCT 224 194 195   MPV 9.7 9.0 9.6     Recent Labs     25  1255 25  0623 25  0330   SODIUM 138 138 138   POTASSIUM 4.5 3.8 4.0   CHLORIDE 103 102 102   CO2 23 25 26   GLUCOSE 121* 98 128*   BUN 12 12 15   CREATININE 0.94 1.01 0.94   CALCIUM 9.3 9.0 9.2               Intake/Output                         25 - 25 0659 25 - 25 0659      Total  Total                 Intake    P.O.  --  0 0  --  -- --    P.O. -- 0 0 -- -- --    I.V.  39.1  -- 39.1  --  -- --    Magnesium Sulfate Volume 39.1 -- 39.1 -- -- --    Total Intake 39.1 0 39.1 -- -- --       Output    Urine  --  250 250  --  -- --    Number of Times Voided -- 1 x 1 x -- -- --    Urine Void (mL) -- 250 250 -- -- --    Total Output -- 250 250 -- -- --       Net I/O     39.1 -250 -210.9 -- -- --              Intake/Output Summary (Last 24 hours) at 3/6/2025 0816  Last data filed at 3/6/2025 0340  Gross per 24 hour   Intake 39.09 ml   Output 250 ml   Net -210.91 ml             enoxaparin (LOVENOX) injection  30 mg Q12HRS    amLODIPine  5 mg DAILY    carvedilol  25 mg BID WITH MEALS    cyanocobalamin  100 mcg DAILY     mometasone-formoterol  2 Puff BID    insulin GLARGINE  26 Units Q EVENING    lisinopril  40 mg Q EVENING    mirtazapine  22.5 mg Nightly    omeprazole  20 mg BID    rosuvastatin  40 mg DAILY    tiotropium  5 mcg DAILY    acetaminophen  650 mg Q6HRS PRN    Pharmacy Consult Request  1 Each PHARMACY TO DOSE    hydrALAZINE  10 mg Q4HRS PRN    ondansetron  4 mg Q4HRS PRN    Or    ondansetron  4 mg Q4HRS PRN    insulin lispro  1-6 Units 4X/DAY [facility]S    And    dextrose bolus  25 g Q15 MIN PRN    oxyCODONE immediate-release  5 mg Q3HRS PRN    Or    oxyCODONE immediate-release  10 mg Q3HRS PRN    Or    HYDROmorphone  0.5 mg Q3HRS PRN    lidocaine  2 Patch Q24HR    cyclobenzaprine  10 mg TID PRN       Assessment and Plan:  Hospital day # 3 syncope, ? C6-7 disc/osteo  POD# na  Chemical prophylactic DVT therapy: Yes - Lovenox Dose 30mg/bid  Start date/time: today     Brain Compression: No  No need to keep pt in house from our standpoint  No abx  Labs normal, bc neg  DC home  Will f/u pt in office 6 weeks with repeat cervical mri w/ and w/o  Cont prn oxy and muscle relaxers

## 2025-03-06 NOTE — PROGRESS NOTES
Telephone report given to BJ Conte for the transfer of the patient to [facility].

## 2025-03-06 NOTE — PROGRESS NOTES
Monitor summary: Per Monitor Room    SB, SR with 1st degree heart block rate 53-75. 5 beats V-tac.  PVC Ectopy  0.25/0.09/0.43

## 2025-03-06 NOTE — CARE PLAN
The patient is Stable - Low risk of patient condition declining or worsening    Shift Goals  Clinical Goals: MRI, echocardiogram orthostatics  Patient Goals: Pain control  Family Goals: No family present    Progress made toward(s) clinical / shift goals:    Problem: Knowledge Deficit - Standard  Goal: Patient and family/care givers will demonstrate understanding of plan of care, disease process/condition, diagnostic tests and medications  Outcome: Progressing     Problem: Fall Risk  Goal: Patient will remain free from falls  Outcome: Progressing       Patient is not progressing towards the following goals:

## 2025-03-06 NOTE — CARE PLAN
The patient is Stable - Low risk of patient condition declining or worsening    Shift Goals  Clinical Goals: Manage pain, orthostatics, tele, blood cultures  Patient Goals: rest, comfort  Family Goals: neville    Progress made toward(s) clinical / shift goals:      Problem: Knowledge Deficit - Standard  Goal: Patient and family/care givers will demonstrate understanding of plan of care, disease process/condition, diagnostic tests and medications  Outcome: Progressing  Note: Educated pt on POC. All questions and concerns answered at this moment.        Problem: Fall Risk  Goal: Patient will remain free from falls  Outcome: Progressing  Note: Pt scored  moderate when assessed for fall risk. Bed is locked and in lowest position. Pt belongings and call light are within reach. Pt calls appropriately.     Problem: Pain - Standard  Goal: Alleviation of pain or a reduction in pain to the patient’s comfort goal  Outcome: Progressing  Note: See mar for intervention

## 2025-03-07 NOTE — PROGRESS NOTES
"Cardiac rhythm: normal sinus  and sinus bradycardia    Ectopy: premature ventricular contractions (unifocal), bigeminy, and trigeminy  Frequency: occasional, frequent    CA: 0.24  QRS: 0.10  QT: 0.45    Rate range (shift): 57-81            Summary of \"[facility] Telemetry Monitor 2 Hour flow Sheet\" report sent to unit from [facility].    "

## 2025-03-07 NOTE — CONSULTS
DATE OF SERVICE:  03/06/2025     INFECTIOUS DISEASE CONSULTATION     Patient of Dr. Peralta.      CONSULT REQUESTED BY:  Dr. Sukhwinder Howell.     REASON FOR CONSULTATION:  The patient with possible osteomyelitis, diskitis   T6-T7.     HISTORY OF PRESENT ILLNESS:  The patient is a 79-year-old male who was   admitted to [facility] on 03/04/2025.  History is obtained  from chart review as well as discussed the case with Dr. Peralta as well as   radiologist.  It should be noted the patient is a fair to good historian.     The patient presented to the emergency room complaining of neck pain.    According to the patient's history, approximately 1 month prior to presenting   to the emergency room, he developed some neck pain.  It is difficult to get   specific timing from the patient, but it appears that this neck pain probably   at least increased if did not start following a fall.     The patient had been problems with falling over the past month.  He has been   evaluated by the [facility] starting 1 1/2-2 months ago for his vertigo.     The patient states that without warning he fell, striking the back of his head  against a table.  Following that episode  he had another episode where he fell   without warning, landing on his time on his  right arm.  The patient states   that the pain in his neck has increased since at least his fall.     The patient states that his neck felt funny where the sutures were, but he has  a hard time describing whether he actually had pain at the site.  He does   mention that there are certain positions he can get his head into which causes  more pain.  He described the pain when he presented as 10/10, but he tells me  there are times he can get into the right position he has next to no pain.    The patient was evaluated when he presented with a CT scan of his neck.     IMAGING STUDIES:  On this patient initially obtained including a CT scan did   not show any evidence of  fractures.  However, an MRI was obtained with and   without gadolinium on 03/05/2025, which showed postsurgical changes as   evidenced by posterior fusion of C2, C3 and C4 with transpedicular screws   posterior decompression from C2-C7.  There is abnormal edema and contrast   enhancement noted involving C6 and C7 vertebral bodies.  Abnormal enhancement   epidural phlegmon is noted at C6 and C7.  There is also abnormal enhancing   prevertebral phlegmon at the level of C6 and C7.  There is also a linear   nonhealing prevertebral fluid collection with surrounding contrast enhancement  noted extending from C1-C7.  I had a long discussion with the radiologist today  and the feeling is that C6-C7 is abnormal in the disk and the vertebrae.  The  changes are most suggestive of infection, but not definitively caused by   infection.  I have asked the radiologist if there is a spot that they could   needle to obtain a biopsy and they said that the area of abnormality noted is   too small to put a needle into that area.     Upon questioning the patient, the patient denies any fever, chills or sweats   in conjunction with this current presentation or over the past month or two.    He states he has been eating well without other complaints.  Evaluation in the  emergency room when he presented showed that he had a normal white blood   Cell count along with normal a Westergren sed rate and C-reactive protein.  The   patient states that Dr. Peralta performed surgery on 9/23/24 the patient's prior   C3-T1 fusion rods were removed along with removal of bilateral pedicle screws   at T1, removal of lateral mass screws at C6 and C5 with placement of bilateral   pars screws at C2, use of O-arm neuronavigation for screw placement, partial   laminectomy cervical 2 bilaterally, kimberly placement cervical 2, 3 and 4;   posterolateral arthrodesis cervical 2, 3 and 4 bilaterally; use of local harvested   morsellized autograft,use of allograft with  intraoperative monitoring.  He did well   he states following that surgery. The patient denied any drainage from his   wound following surgery or erythema.  He did not have any fever or chills at   that time.  As mentioned above, the only thing the patient states he feels from   that surgery directly was a funny sensation where his sutures where.     The patient states that he can lay flat in bed with very little pain, but when  he bends his neck to read while in bed, his neck hurts.  He also states that   he twists a certain positions of his head, he develops pain.  [facility] has [facility] and they are still evaluating   that, which is associated with his falls he believes.     The patient at this time denies any significant headache or earache.  He   denies any sore throat or cough.  He denies any shortness of breath or chest   pain.  He denies any nausea or vomiting.  He denies any abdominal pain or   diarrhea.  He denies any frequency, urgency or dysuria.  He denies any new   sore joints, muscles, rashes, sores or other lesions.     ALLERGIES:  None.     ILLNESSES:  The patient carries the diagnosis of hypertension, COPD, diabetes   mellitus, myocardial infarction status post 2 stents, dyslipidemia, emphysema,  GERD, obstructive sleep apnea and undiagnosed psychiatric issue per chart.    The patient has been a diabetic he states for at least 5 years.  His   hemoglobin A1c on admission was 7.     SURGERIES:  The patient is status post cardiac surgery in 03/2012 or 04/2012.   He had abdominal surgery in 1987.  He had a cervical fusion on 08/21/2014,   shoulder arthroscopy with right rotator cuff repair in 1991.  He had lumbar   laminectomy with diskectomy in the past.  He has also had carpal tunnel   release on the left.  He has had an appendectomy, another lumbar laminectomy   and diskectomy on 08/21/2019, removal of stones and biliary pancreatic duct   dilation 07/05/2021  and laparoscopic cholecystectomy on 07/06/2021.     MEDICATIONS:  On admission to the hospital, the patient was on Tylenol p.r.n.,  Norco 5/325 one tablet p.r.n., magnesium oxide 420 mg, amlodipine 5 mg daily,  carvedilol 25 mg b.i.d. with meals, cyanocobalamin 100 mcg daily, Advair   250/50 one puff 2 times a day, ibuprofen 200 mg p.r.n. 600 mg usually at   bedtime, Lantus insulin 26 units every evening, lisinopril 40 mg daily,   metformin 500 mg 2 times a day, Remeron 15 mg taken as 2.5 mg every evening,   Protonix 40 mg daily, rosuvastatin 40 mg daily, and Spiriva 2.5 mcg solution   inhaled given as 5 mcg daily.     FAMILY HISTORY:  The patient's father did have diabetes mellitus.     SOCIAL HISTORY:  The patient is .  Cigarettes, the patient states that   he started smoking is associated with his time in [place].  He smoked up to 3   packs per day.  He smoked perhaps for approximately 20 years.  He is estimated  to have a 63-pack-year history of smoking.  He gave up cigarettes 42 years   ago.  Alcohol, the patient states he does not use alcohol now.  He states he   did have drinking problem decades ago in the past and gave up drinking at that  time and he also gave around that time when he gave up smoking.  He denies   any IV or recreational drug use.  The patient was born in [place], he lived  in [place].  His all infections have been when he ruptured his appendix and   when he had his gallbladder attack, which was removed.  Otherwise, the patient  denies any other significant infections.  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     REVIEW OF SYSTEMS:  Negative other than those listed above and below.   CENTRAL NERVOUS SYSTEM:  No history of head tremor, seizures, migraines,   cerebral accidents or TIAs.  PULMONARY:  No history of asthma, but he does have history of COPD.  No   history of tuberculosis, hemoptysis or pulmonary emboli.  CARDIAC:  The patient has a history of high  blood pressure and myocardial   infarction.  No history of vascular disease.  GASTROINTESTINAL:  The patient does have history of GERD.  No history of   peptic ulcer disease, hepatitis, irritable bowel, diverticulosis,   hematochezia, hematemesis, or unexplained weight loss.  GENITOURINARY:  No history of kidney stones or bladder infections.  No other   genitourinary issues.  MUSCULOSKELETAL:  Possible history of gout.  INTEGUMENT:  No history of skin cancers.     PHYSICAL EXAMINATION:  VITAL SIGNS:  T-max past 24 hours is 98.1, temperature at time of consult   97.9, respirations 16, pulse 93, blood pressure 145/83, he is 93% saturated on  3 L.  The patient's 177.8 cm tall, he weighs 89.3 kg, his BMI is 28.25 kg/m2.  GENERAL:  The patient is normally developed, normally nourished appearing   white male who appears in no acute respiratory distress at this time.  HEENT:  Head is normocephalic.  Eyes:  Pupils appear equal, round, reactive to  light.  Conjunctivae are slightly injected.  The patient states he is having   problems sleeping that is why his eyes are injected.  Nose and Mouth:  No   acute sores or lesions are noted.   NECK:  The patient is able to twist his neck, but his neck is somewhat stiff   secondary his previous fusions.  His cervical incision appears well-healed at   this time without erythema, crepitance, fluctuance or induration.  There is no  drainage.  There is no specific pinpoint area tenderness.  The patient does   complain of tenderness all along his upper back.  He denies any significant   tenderness along his lower back.  LUNGS:  Breath sounds are clear to auscultation bilaterally without wheezes or  rhonchi.  HEART:  Regular rate and rhythm without murmur.  ABDOMEN:  Soft, without tenderness, guarding, rigidity or rebound.  GENITOURINARY:  No acute sores or lesions are noted.   EXTREMITIES:  No acute sores or lesions are seen.  CENTRAL NERVOUS SYSTEM:  Mental status:  The patient appears  oriented x4.    Motor and sensory appear grossly intact.     SUMMARY OF LABORATORY DATA:  On admission to the hospital, the patient's white   blood cell count was 7.1 with 64.1% segs, 23.9% lymphs, 9.2% monocytes, 2.1%   eos, 0.4% basophils, and 0.2% immature granulocytes.  Today, white blood cell   count 6.4 with hemoglobin/hematocrit 14.5/42.0 respectively and platelet count  of 195,000.  A Westergren sed rate on admission was 10 with 20 being the   upper limit of normal.  A Westergren sed rate on 03/05/2025 was 9 similar   reference range.  From admission, the patient has had essentially normal chem   panel with exception of elevated glucose at 121.  Today, the patient's sodium   is 138, potassium 4.0, chloride 102, bicarbonate 26, random glucose 128, BUN   15, creatinine 0.94 and calcium 9.2.  On 03/04/2025, the patient's C-reactive   protein was less than 0.30.  Today, the patient's C-reactive protein is 0.38   with 0.75 being the upper limit of normal for both C-reactive protein levels.    Blood cultures obtained on admission are no growth at this time.     IMPRESSION:  1.  Cervical neck pain, etiology unclear.  Possibilities include traumatic   versus infectious.  2.  Recent onset vertigo.  3.  Diabetes mellitus type 2 with fair control.  Hemoglobin A1c 7.  4.  COPD.  5.  Essential hypertension.  6.  Prior myocardial infarction.  7.  Dyslipidemia.  8.  Emphysema.  9.  GERD.  10.  Obstructive sleep apnea.  11.  Cervical surgery 09/23/2024.  12.  Normal range inflammatory markers -- Westergren sed rate and C-reactive   protein.     DISCUSSION:  The patient presents with pain in his neck, which has been   getting worse over the past month.  Prior to the patient's neck pain   developing and becoming worse, he has had episodes of falling associated   with vertigo.  The patient's timing and history is difficult to obtain, but it   seems that the vertigo preceded his neck pain getting worse and he does give   A  history of striking his neck with one of his falls.     In regard to infection in this patient, he is afebrile with normal white blood  cell count, normal C-reactive protein and normal Westergren sed rate.  There   is also no history of drainage from his neck or problems with neck healing   following his initial surgery.  There is history of trauma as mentioned above.  The MRI of the patient's cervical spine shows abnormality at C6-C7   suggesting diskitis and osteomyelitis, but trauma was not ruled out by   radiology.  When asked if there was a spot to have IR aspirate, I was told by   radiologist the spot was too small and difficult to get to consider aspirating   it at this time.     The patient has very subtle symptoms if any suggestive of infection at this   time other than his abnormal MRI.  I have explained to the patient that if he   does have infection in his cervical spine already, his hardware would already   be contaminated.  There is no reason not to watch this patient to see if he   develops further signs or symptoms of infection versus pain from trauma.  I   discussed this finding with Dr. Peralta who agrees.  It is safe to watch this   patient he fells and he will repeat another MRI in the patient at approximately   6 weeks to see if there is any significant change.     If there are changes noted in the patient's cervical spine suggestive of   infection, he will need aspiration of the site to identify an organism.  If no  organism has been identified by that time and if there has been no evidence   of drainage.  The patient understands that he will require 6 weeks with IV   antibiotics if he does have infection in his back.  He is not excited   about the option of undergoing 6 weeks of IV antibiotics now without more   definitive diagnosis of infection.    I really cannot justify at this point in time   subjecting the patient to IV antibiotic therapy without a harder diagnosis for   infection and  preferably with a known  organism.     We will watch the patient at this time.  We will follow up with labs.  Dr. Peralta will coordinate his outpatient follow up.  Should the patient show   signs of changing MRI or labs, then we will seriously reevaluate the patient   for IV therapy treatment for diskitis and osteomyelitis.     PLAN:  1.  Observe the patient for now.  2.  The patient to follow up with Dr. Peralta with MRI in approximately 6   weeks' time.  3.  We will see this patient in the future if Dr. Peralta feels that the MRI is   abnormal, suggestive of infection.  Hopefully, the patient can stay off of   all antibiotics for now and then, so we get a good culture should this patient   actually have an infectious process as opposed to traumatic process.     Total time spent with the patient, obtaining history, performing physical   exam, reviewing labs, reviewing treatment to date, reviewing past history,   discussing with radiology, discussing with Dr. Peralta and dictating this note   60-65 minutes with at least 70% of time spent face to face view and 100% time   spent in direct patient care/counseling/consulting with orders and   recommendations as above today.        ______________________________  MD JORGE JOHNSTON/SAMANTHA    DD:  03/06/2025 13:55  DT:  03/06/2025 17:19    Job#:  [id number]    CC:LISA PERALTA MD

## 2025-03-07 NOTE — DISCHARGE SUMMARY
Discharge Summary    CHIEF COMPLAINT ON ADMISSION  Chief Complaint   Patient presents with    Fall     Pt states he has been falling frequently just from weakness, last fall was 4 days ago with +LOC pt PCP advised him to come to ED for evaluation of his heart, possible reason for frequent falls.        Reason for Admission  Dizzy; Neck Pain     Admission Date  3/4/2025    CODE STATUS  Full Code    HPI & HOSPITAL COURSE  Pérez Willams is a 79 y.o. male with a past medical history of hypertension, COPD, diabetes, GERD, MI with stents, cholecystectomy and cervical fusion with Dr. Peralta who presented 3/4/2025 with fall and neck pain. Reported to have multiple syncopal episodes recently.   In the emergency department, CBC, CMP, troponin, procalcitonin, TSH, CRP are all unremarkable.  COVID/flu/RSV negative.  Chest x-ray without cardiopulmonary disease.  CT/CTA head and neck are unremarkable.  CT L and T-spine without acute findings.  CT C-spine demonstrates inferior articular process fracture on the right at C6-7, potential fracture within bone graft material at the posterior margin of left C5-6 facet with partial facet uncovering.  CT findings were discussed with Dr. Grant from neurosurgery by ERP. MRI cervical spine completed. Per neurosurgery no concern for active discitis or osteomyelitis, no antibiotics recommended. Will repeat cervical MRI in 6 weeks. In regards to his syncopal episodes no arterial stenosis noted on imaging, 2D echo with no valvular abnormalities or outflow obstruction. Orthostatic vitals negative, no oxygen requirements on ambulating O2 testing. Patient improved clinically and is agreeable to discharge. Patient was determined satisfactory for discharge with appropriate follow up.    Therefore, he is discharged in fair and stable condition to home with close outpatient follow-up.    The patient met 2-midnight criteria for an inpatient stay at the time of discharge.    Discharge  Date  03/06/2025    FOLLOW UP ITEMS POST DISCHARGE  Please follow up with PCP in 3-5 days for post hospitalization follow up and medication reconciliation.     DISCHARGE DIAGNOSES  Principal Problem:    Syncope, unspecified syncope type (POA: Yes)  Active Problems:    Cervical spondylosis with myelopathy (POA: Yes)    COPD (chronic obstructive pulmonary disease) ([facility]) (POA: Yes)    HTN (hypertension) (POA: Yes)    T2DM (type 2 diabetes mellitus) ([facility]) (POA: Yes)    Osteomyelitis of cervical spine ([facility]) (POA: Unknown)  Resolved Problems:    * No resolved hospital problems. *      FOLLOW UP  No future appointments.  Quentin Baker N.P.  [address]  [facility] [address]  [phone]    Schedule an appointment as soon as possible for a visit in 2 week(s)  Hospital Visit Follow-up      MEDICATIONS ON DISCHARGE     Medication List        START taking these medications        Instructions   cyclobenzaprine 10 mg Tabs  Commonly known as: Flexeril   Take 1 Tablet by mouth 3 times a day as needed for Muscle Spasms.  Dose: 10 mg     lidocaine 4 % Ptch  Start taking on: March 7, 2025  Commonly known as: Asperflex   Place 2 Patches on the skin every 24 hours.  Dose: 2 Patch            CONTINUE taking these medications        Instructions   amLODIPine 5 MG Tabs  Commonly known as: Norvasc   Take 5 mg by mouth every day.   Dose: 5 mg     carvedilol 25 MG Tabs  Commonly known as: Coreg   Take 25 mg by mouth 2 times a day with meals.   Dose: 25 mg     cyanocobalamin 100 MCG tablet   Take 100 mcg by mouth every day.  Dose: 100 mcg     fluticasone-salmeterol 250-50 MCG/ACT Aepb  Commonly known as: Advair   Inhale 1 Puff 2 times a day.  Dose: 1 Puff     HYDROcodone-acetaminophen 5-325 MG Tabs per tablet  Commonly known as: Norco   Take 1 Tablet by mouth one time as needed (pain).  Dose: 1 Tablet     insulin GLARGINE 100 UNIT/ML Soln  Commonly known as: Lantus,Semglee   Inject 26 Units under the skin every evening.    Dose: 26 Units     lisinopril 40 MG tablet  Commonly known as: Prinivil   Take 40 mg by mouth every evening.   Dose: 40 mg     Magnesium Oxide 420 MG Tabs   Take 420 mg by mouth every day.  Dose: 420 mg     metFORMIN  MG Tb24  Commonly known as: Glucophage XR   Take 500 mg by mouth 2 times a day.  Dose: 500 mg     mirtazapine 15 MG Tabs  Commonly known as: Remeron   Take 22.5 mg by mouth every evening. 22.5 mg = 1.5 tabs  Dose: 22.5 mg     pantoprazole 40 MG Tbec  Commonly known as: Protonix   Take 40 mg by mouth 2 times a day.   Dose: 40 mg     rosuvastatin 40 MG tablet  Commonly known as: Crestor   Take 40 mg by mouth every day.   Dose: 40 mg     tiotropium 2.5 mcg/Act Aers  Commonly known as: Spiriva Respimat   Inhale 5 mcg every day.  Dose: 5 mcg     TYLENOL PO   Take 1 Tablet by mouth at bedtime as needed.  Dose: 1 Tablet            STOP taking these medications      ibuprofen 200 MG Tabs  Commonly known as: Motrin              Allergies  No Known Allergies    DIET  Orders Placed This Encounter   Procedures    Diet Order Diet: Consistent CHO (Diabetic)     Standing Status:   Standing     Number of Occurrences:   1     Diet::   Consistent CHO (Diabetic) [4]    Discontinue Diet Tray     Standing Status:   Standing     Number of Occurrences:   1       LABORATORY  Lab Results   Component Value Date    SODIUM 138 03/06/2025    POTASSIUM 4.0 03/06/2025    CHLORIDE 102 03/06/2025    CO2 26 03/06/2025    GLUCOSE 128 (H) 03/06/2025    BUN 15 03/06/2025    CREATININE 0.94 03/06/2025        Lab Results   Component Value Date    WBC 6.4 03/06/2025    HEMOGLOBIN 14.5 03/06/2025    HEMATOCRIT 42.0 03/06/2025    PLATELETCT 195 03/06/2025        Total time of the discharge process exceeds 37 minutes.

## 2025-03-07 NOTE — PROGRESS NOTES
PIV removed. Discharge instructions and plan reviewed with patient. Copy provided to patient and signed All belongings sent with patient. Pt escorted to pick-up area by nursing staff via WC.

## 2025-03-10 LAB
BACTERIA BLD CULT: NORMAL
BACTERIA BLD CULT: NORMAL
SIGNIFICANT IND 70042: NORMAL
SIGNIFICANT IND 70042: NORMAL
SITE SITE: NORMAL
SITE SITE: NORMAL
SOURCE SOURCE: NORMAL
SOURCE SOURCE: NORMAL

## (undated) DEVICE — SUTURE 4-0 MONOCRYL PLUS PS-2 - 27 INCH (36/BX)

## (undated) DEVICE — SODIUM CHL IRRIGATION 0.9% 1000ML (12EA/CA)

## (undated) DEVICE — CANNULA W/SEAL 5X100 Z-THRE - ADED KII (12/BX)

## (undated) DEVICE — LACTATED RINGERS INJ 1000 ML - (14EA/CA 60CA/PF)

## (undated) DEVICE — SPHERE NAVIGATION STEALTH (5EA/TY 12TY/PK)

## (undated) DEVICE — SUTURE 0 VICRYL PLUS UR-6 - 27 INCH (36/BX)

## (undated) DEVICE — KIT ROOM DECONTAMINATION

## (undated) DEVICE — KIT GEL-FLOW NT ABORBABLE GELATIN (6EA/BX)

## (undated) DEVICE — TUBING CLEARLINK DUO-VENT - C-FLO (48EA/CA)

## (undated) DEVICE — GLOVE SIZE 6.5 SURGEON ACCELERATOR FREE GREEN (50PR/BX)

## (undated) DEVICE — SET LEADWIRE 5 LEAD BEDSIDE DISPOSABLE ECG (1SET OF 5/EA)

## (undated) DEVICE — PAD LAP STERILE 18 X 18 - (5/PK 40PK/CA)

## (undated) DEVICE — SET EXTENSION WITH 2 PORTS (48EA/CA) ***PART #2C8610 IS A SUBSTITUTE*****

## (undated) DEVICE — BLADE CLIPPER FITS 2501 CLIPPER (BLUE) (20EA/CA)

## (undated) DEVICE — COVER MAYO STAND X-LG - (22EA/CA)

## (undated) DEVICE — FORCEP BIPOLAR ISOCOOL 8.5 1.0MM TIP"

## (undated) DEVICE — SUTURE GENERAL

## (undated) DEVICE — SPONGE GAUZE NON-STERILE 4X4 - (2000/CA 10PK/CA)

## (undated) DEVICE — BAG SPONGE COUNT 10.25 X 32 - BLUE (250/CA)

## (undated) DEVICE — CLOSURE SKIN STRIP 1/2 X 4 IN - (STERI STRIP) (50/BX 4BX/CA)

## (undated) DEVICE — STERI STRIP COMPOUND BENZOIN - TINCTURE 0.6ML WITH APPLICATOR (40EA/BX)

## (undated) DEVICE — BITE BLOCK ADULT 60FR (100EA/CA)

## (undated) DEVICE — NEPTUNE 4 PORT MANIFOLD - (20/PK)

## (undated) DEVICE — CORDS BIPOLAR COAGULATION - 12FT STERILE DISP. (10EA/BX)

## (undated) DEVICE — DRILL BIT NAVIGATED 2.4MM

## (undated) DEVICE — DRAPESURG STERI-DRAPE LONG - (10/BX 4BX/CA)

## (undated) DEVICE — APPLICATOR SURGIFLO - (6EA/CA)

## (undated) DEVICE — SET TUBING PNEUMOCLEAR HIGH FLOW SMOKE EVACUATION (10EA/BX)

## (undated) DEVICE — GLOVE SZ 6.5 BIOGEL PI MICRO - PF LF (50PR/BX)

## (undated) DEVICE — CHLORAPREP 26 ML APPLICATOR - ORANGE TINT(25/CA)

## (undated) DEVICE — GLOVE BIOGEL PI ORTHO SZ 8 PF LF (40PR/BX)

## (undated) DEVICE — DRAPE CLEAR W/ELASTIC BAND RAD CARM 40 X40" (20EA/CA)"

## (undated) DEVICE — CLIP MED LG INTNL HRZN TI ESCP - (20/BX)

## (undated) DEVICE — TOOL DISSECT MATCH HEAD

## (undated) DEVICE — SUCTION INSTRUMENT YANKAUER BULBOUS TIP W/O VENT (50EA/CA)

## (undated) DEVICE — MIDAS LUBRICATOR DIFFUSER PACK (4EA/CA)

## (undated) DEVICE — GLOVE BIOGEL SZ 6.5 SURGICAL PF LTX (50PR/BX 4BX/CA)

## (undated) DEVICE — GOWN SURGICAL XX-LARGE - (28EA/CA) SIRUS NON REINFORCED

## (undated) DEVICE — TOOL MR8 14CM MATCH HD SYM-TRI 3MM DIAMETER (1/EA)

## (undated) DEVICE — TOWEL STOP TIMEOUT SAFETY FLAG (40EA/CA)

## (undated) DEVICE — COVER PROBE STERILE CONE (12EA/CA)

## (undated) DEVICE — DRAPE PATIENT STERILE FOR USE WITH O OR C ARMS (10EA/BX)

## (undated) DEVICE — SLEEVE, VASO, THIGH, MED

## (undated) DEVICE — GLOVE BIOGEL SZ 7 SURGICAL PF LTX - (50PR/BX 4BX/CA)

## (undated) DEVICE — PROTECTOR ULNA NERVE - (36PR/CA)

## (undated) DEVICE — SUTURE GOR-TEX CV-6 TT-9 (36PK/BX)

## (undated) DEVICE — GLOVE BIOGEL INDICATOR SZ 6.5 SURGICAL PF LTX - (50PR/BX 4BX/CA)

## (undated) DEVICE — GLOVE BIOGEL SZ 8 SURGICAL PF LTX - (50PR/BX 4BX/CA)

## (undated) DEVICE — TROCAR 5X100 NON BLADED Z-TH - READ KII (6/BX)

## (undated) DEVICE — SHEET PEDIATRIC LAPAROTOMY - (10/CA)

## (undated) DEVICE — BAG RETRIEVAL 10ML (10EA/BX)

## (undated) DEVICE — PIN HEAD MAYFIELD DISP. (3EA/PK 12PK/BX)

## (undated) DEVICE — DRESSING AQUACEL AG ADVANTAGE 3.5 X 10" (10EA/BX)"

## (undated) DEVICE — BALLOON RETRIEVAL EXTRACTOR PRO RX   9-12MM

## (undated) DEVICE — BONE MILL BM210

## (undated) DEVICE — SUTURE 0 VICRYL PLUS CT-2 - 8 X 18 INCH (12/BX)

## (undated) DEVICE — SUTURE 1 VICRYL PLUS CTX - 8 X 18 INCH (12/BX)

## (undated) DEVICE — KIT CUSTOM PROCEDURE SINGLE FOR ENDO  (15/CA)

## (undated) DEVICE — DERMABOND ADVANCED - (12EA/BX)

## (undated) DEVICE — SYRINGE SAFETY 5 ML 18 GA X 1-1/2 BLUNT LL (100/BX 4BX/CA)

## (undated) DEVICE — SENSOR OXIMETER ADULT SPO2 RD SET (20EA/BX)

## (undated) DEVICE — GOWN WARMING STANDARD FLEX - (30/CA)

## (undated) DEVICE — CANISTER SUCTION RIGID RED 1500CC (40EA/CA)

## (undated) DEVICE — BLANKET WARMING LOWER BODY (10EA/CA)

## (undated) DEVICE — KIT SURGIFLO W/OUT THROMBIN - (6EA/CA)

## (undated) DEVICE — TOWELS CLOTH SURGICAL - (4/PK 20PK/CA)

## (undated) DEVICE — SEALER BIPOLAR 2.3 AQUAMANTYS

## (undated) DEVICE — FORCEPS ELECTROSURGICAL DISPOSABLE CODMAN 8IN 1.5MM

## (undated) DEVICE — TUBE CONNECT SUCTION CLEAR 120 X 1/4" (50EA/CA)"

## (undated) DEVICE — MASK ANESTHESIA ADULT  - (100/CA)

## (undated) DEVICE — CANISTER SUCTION 3000ML MECHANICAL FILTER AUTO SHUTOFF MEDI-VAC NONSTERILE LF DISP (40EA/CA)

## (undated) DEVICE — TUBE CONNECTING SUCTION - CLEAR PLASTIC STERILE 72 IN (50EA/CA)

## (undated) DEVICE — GLOVE BIOGEL SZ 8.5 SURGICAL PF LTX - (50PR/BX 4BX/CA)

## (undated) DEVICE — INTRAOP NEURO IN OR 1:1 PER 15 MIN

## (undated) DEVICE — KIT ANESTHESIA W/CIRCUIT & 3/LT BAG W/FILTER (20EA/CA)

## (undated) DEVICE — FILM CASSETTE ENDO

## (undated) DEVICE — SPONGE GAUZESTER. 2X2 4-PL - (2/PK 50PK/BX 30BX/CS)

## (undated) DEVICE — TROCAR Z THREAD11MM OPTICAL - NON BLADED(6/BX)

## (undated) DEVICE — TUBE E-T HI-LO CUFF 7.5MM (10EA/PK)

## (undated) DEVICE — JAGTOME RX 39 PRELOADED .025 X 260CM

## (undated) DEVICE — SET SUCTION/IRRIGATION WITH DISPOSABLE TIP (6/CA )PART #0250-070-520 IS A SUB

## (undated) DEVICE — ELECTRODE DUAL RETURN W/ CORD - (50/PK)

## (undated) DEVICE — SENSOR SPO2 NEO LNCS ADHESIVE (20/BX) SEE USER NOTES

## (undated) DEVICE — PACK NEURO - (2EA/CA)

## (undated) DEVICE — ELECTRODE 850 FOAM ADHESIVE - HYDROGEL RADIOTRNSPRNT (50/PK)

## (undated) DEVICE — PACK JACKSON TABLE KIT W/OUT - HR (6EA/CA)

## (undated) DEVICE — CANISTER SUCTION 3000ML MECHANICAL FILTER AUTO SHUTOFF MEDI-VAC NONSTERILE LF DISP  (40EA/CA)

## (undated) DEVICE — SYRINGE SAFETY 10 ML 18 GA X 1 1/2 BLUNT LL (100/BX 4BX/CA)

## (undated) DEVICE — TROCAR LAPSCP 100MM 12MM NTHRD - (6/BX)

## (undated) DEVICE — KIT EVACUATER 3 SPRING PVC LF 1/8 DRAIN SIZE (10EA/CA)"

## (undated) DEVICE — HEADREST PRONEVIEW LARGE - (10/CA)

## (undated) DEVICE — RESERVOIR SUCTION 100 CC - SILICONE (20EA/CA)

## (undated) DEVICE — BOVIE BLADE COATED &INSULATED - 25/PK

## (undated) DEVICE — DRESSING TRANSPARENT FILM TEGADERM 2.375 X 2.75"  (100EA/BX)"

## (undated) DEVICE — SUTURE CV

## (undated) DEVICE — COVER LIGHT HANDLE ALC PLUS DISP (18EA/BX)

## (undated) DEVICE — ARMREST CRADLE FOAM - (2PR/PK 12PR/CA)

## (undated) DEVICE — DRAPE 36X28IN RAD CARM BND BG - (25/CA) O

## (undated) DEVICE — HEAD HOLDER JUNIOR/ADULT

## (undated) DEVICE — GLOVE BIOGEL PI INDICATOR SZ 7.0 SURGICAL PF LF - (50/BX 4BX/CA)

## (undated) DEVICE — DRAIN J-VAC 10MM FLAT - (10/CA)

## (undated) DEVICE — PACK LAP CHOLE OR - (2EA/CA)

## (undated) DEVICE — DRAPE MICROSCOPE X-LONG (10EA/CA)

## (undated) DEVICE — TRAY SURESTEP FOLEY TEMP SENSING 16FR SNAP SECURE(10EA/CA) ORDER #18764 FOR TEMP FOLEY ONLY

## (undated) DEVICE — KIT SURGIFLO W/OUT THROMBIN - (6EA/BX)